# Patient Record
Sex: FEMALE | Race: WHITE | NOT HISPANIC OR LATINO | Employment: OTHER | URBAN - METROPOLITAN AREA
[De-identification: names, ages, dates, MRNs, and addresses within clinical notes are randomized per-mention and may not be internally consistent; named-entity substitution may affect disease eponyms.]

---

## 2017-01-04 ENCOUNTER — LAB (OUTPATIENT)
Dept: INTERNAL MEDICINE | Facility: CLINIC | Age: 62
End: 2017-01-04

## 2017-01-04 DIAGNOSIS — R31.9 HEMATURIA: Primary | ICD-10-CM

## 2017-01-04 LAB
BACTERIA UR QL AUTO: ABNORMAL /HPF
BILIRUB UR QL CFM: NEGATIVE
BILIRUB UR QL STRIP: ABNORMAL
CLARITY UR: CLEAR
COLOR UR: YELLOW
GLUCOSE UR STRIP-MCNC: NEGATIVE MG/DL
HGB UR QL STRIP.AUTO: ABNORMAL
HYALINE CASTS UR QL AUTO: ABNORMAL /LPF
KETONES UR QL STRIP: ABNORMAL
LEUKOCYTE ESTERASE UR QL STRIP.AUTO: ABNORMAL
NITRITE UR QL STRIP: NEGATIVE
PH UR STRIP.AUTO: <=5 [PH] (ref 5–8)
PROT UR QL STRIP: NEGATIVE
RBC # UR: ABNORMAL /HPF
REF LAB TEST METHOD: ABNORMAL
SP GR UR STRIP: 1.02 (ref 1–1.03)
SQUAMOUS #/AREA URNS HPF: ABNORMAL /HPF
UROBILINOGEN UR QL STRIP: ABNORMAL
WBC UR QL AUTO: ABNORMAL /HPF

## 2017-01-04 PROCEDURE — 81001 URINALYSIS AUTO W/SCOPE: CPT | Performed by: INTERNAL MEDICINE

## 2017-01-09 ENCOUNTER — TELEPHONE (OUTPATIENT)
Dept: INTERNAL MEDICINE | Facility: CLINIC | Age: 62
End: 2017-01-09

## 2017-01-09 NOTE — TELEPHONE ENCOUNTER
----- Message from Daisha Day MA sent at 1/9/2017 10:55 AM EST -----  pls review UA recheck and advise per pt request    Pt#[phone]

## 2017-02-23 DIAGNOSIS — E03.9 PRIMARY HYPOTHYROIDISM: Primary | ICD-10-CM

## 2017-02-23 RX ORDER — MULTIVIT-MIN/IRON FUM/FOLIC AC 7.5 MG-4
1 TABLET ORAL DAILY
Qty: 90 TABLET | Refills: 0 | Status: SHIPPED | OUTPATIENT
Start: 2017-02-23 | End: 2018-02-23

## 2017-02-23 RX ORDER — CHLORAL HYDRATE 500 MG
1000 CAPSULE ORAL 2 TIMES DAILY WITH MEALS
Qty: 90 CAPSULE | Refills: 0 | Status: SHIPPED | OUTPATIENT
Start: 2017-02-23 | End: 2021-02-04

## 2017-02-23 RX ORDER — LEVOTHYROXINE SODIUM 125 MCG
125 TABLET ORAL DAILY
Qty: 90 TABLET | Refills: 0 | Status: SHIPPED | OUTPATIENT
Start: 2017-02-23 | End: 2017-12-27 | Stop reason: SDUPTHER

## 2017-02-23 RX ORDER — B-COMPLEX WITH VITAMIN C
1 TABLET ORAL DAILY
Qty: 90 EACH | Refills: 0 | Status: SHIPPED | OUTPATIENT
Start: 2017-02-23 | End: 2021-02-04

## 2017-02-23 NOTE — TELEPHONE ENCOUNTER
Patient states unsure of mg of meds but they are requesting for them to be wrote out as prescriptions for her flex spending to cover

## 2017-02-23 NOTE — TELEPHONE ENCOUNTER
----- Message from Rozina Roberts sent at 2/23/2017  9:39 AM EST -----  Contact: pt  Pt calling, she is retiring and needs to use her flex spending card. Pt would like refills on prescriptions sent into the pharmacy and new prescriptions.     SYNTHROID 125 MCG tablet, 90 day supply    Vitamin D prescription, 90 days     Vitamin B prescription, 90 days  Multiviatmin, 90 days     She says even though she can get it over the counter she will need a prescription to the pharmacy in order to use flex spending.     To [facility]id on [facility]     #[phone]  Ok to matt

## 2017-02-24 ENCOUNTER — TELEPHONE (OUTPATIENT)
Dept: INTERNAL MEDICINE | Facility: CLINIC | Age: 62
End: 2017-02-24

## 2017-02-24 DIAGNOSIS — E55.9 AVITAMINOSIS D: Primary | ICD-10-CM

## 2017-02-24 NOTE — TELEPHONE ENCOUNTER
----- Message from Deepali Alarcon sent at 2/23/2017  3:57 PM EST -----  Contact: pt - Dr Martínez's pt - RE: Rx  Pt calling and would like a Rx for Vit D called to pt's pharmacy. Pt informs that if Rx called to pharmacy pt can get on Flex spending. Pt informs that pt is retiring and would like to use money on card. Could you please call Rx to pt's pharmacy? Please advise. Thanks      Vit D 5000 units      Pt # [phone]

## 2017-03-09 ENCOUNTER — TELEPHONE (OUTPATIENT)
Dept: INTERNAL MEDICINE | Facility: CLINIC | Age: 62
End: 2017-03-09

## 2017-03-09 NOTE — TELEPHONE ENCOUNTER
----- Message from Flaquita Sevilla sent at 3/9/2017 10:50 AM EST -----  Contact: pt  Pt would like a referral to Dr. Haider for endocrinology.  Pt stated she has been on Synthroid for a long time and would like to see endocrine.    Pt's # [phone]

## 2017-04-07 ENCOUNTER — TELEPHONE (OUTPATIENT)
Dept: INTERNAL MEDICINE | Facility: CLINIC | Age: 62
End: 2017-04-07

## 2017-04-07 DIAGNOSIS — E03.9 ADULT HYPOTHYROIDISM: Primary | ICD-10-CM

## 2017-04-07 NOTE — TELEPHONE ENCOUNTER
----- Message from Flaquita Sevilla sent at 4/7/2017  4:22 PM EDT -----  Contact: pt     ----- Message -----     From: Flaquita Sevilla     Sent: 3/9/2017  10:50 AM       To: Rebecca Pugh    Pt would like a referral to Dr. Haider for endocrinology.  Pt stated she has been on Synthroid for a long time and would like to see endocrine.    Pt's # [phone]

## 2017-05-25 ENCOUNTER — TELEPHONE (OUTPATIENT)
Dept: INTERNAL MEDICINE | Facility: CLINIC | Age: 62
End: 2017-05-25

## 2017-06-07 ENCOUNTER — OFFICE VISIT (OUTPATIENT)
Dept: INTERNAL MEDICINE | Facility: CLINIC | Age: 62
End: 2017-06-07

## 2017-06-07 VITALS
WEIGHT: 191 LBS | BODY MASS INDEX: 30.7 KG/M2 | HEIGHT: 66 IN | SYSTOLIC BLOOD PRESSURE: 108 MMHG | TEMPERATURE: 97.7 F | DIASTOLIC BLOOD PRESSURE: 64 MMHG

## 2017-06-07 DIAGNOSIS — E03.9 ADULT HYPOTHYROIDISM: ICD-10-CM

## 2017-06-07 DIAGNOSIS — K21.9 GASTROESOPHAGEAL REFLUX DISEASE WITHOUT ESOPHAGITIS: ICD-10-CM

## 2017-06-07 DIAGNOSIS — R48.1 LOSS OF PERCEPTION FOR TASTE: ICD-10-CM

## 2017-06-07 DIAGNOSIS — R31.9 BLOOD IN URINE: Primary | ICD-10-CM

## 2017-06-07 PROBLEM — K63.5 COLON POLYP: Status: ACTIVE | Noted: 2017-06-07

## 2017-06-07 LAB
BACTERIA UR QL AUTO: ABNORMAL /HPF
BILIRUB UR QL STRIP: NEGATIVE
CLARITY UR: CLEAR
COLOR UR: YELLOW
GLUCOSE UR STRIP-MCNC: NEGATIVE MG/DL
HGB UR QL STRIP.AUTO: ABNORMAL
HYALINE CASTS UR QL AUTO: ABNORMAL /LPF
KETONES UR QL STRIP: NEGATIVE
LEUKOCYTE ESTERASE UR QL STRIP.AUTO: NEGATIVE
MUCOUS THREADS URNS QL MICRO: ABNORMAL /HPF
NITRITE UR QL STRIP: NEGATIVE
PH UR STRIP.AUTO: <=5 [PH] (ref 5–8)
PROT UR QL STRIP: NEGATIVE
RBC # UR: ABNORMAL /HPF
REF LAB TEST METHOD: ABNORMAL
SP GR UR STRIP: 1.02 (ref 1–1.03)
SQUAMOUS #/AREA URNS HPF: ABNORMAL /HPF
UROBILINOGEN UR QL STRIP: ABNORMAL
WBC UR QL AUTO: ABNORMAL /HPF

## 2017-06-07 PROCEDURE — 99214 OFFICE O/P EST MOD 30 MIN: CPT | Performed by: INTERNAL MEDICINE

## 2017-06-07 PROCEDURE — 81001 URINALYSIS AUTO W/SCOPE: CPT | Performed by: INTERNAL MEDICINE

## 2017-06-07 RX ORDER — OMEPRAZOLE 40 MG/1
40 CAPSULE, DELAYED RELEASE ORAL DAILY
Qty: 30 CAPSULE | Refills: 0 | Status: SHIPPED | OUTPATIENT
Start: 2017-06-07 | End: 2017-09-15

## 2017-06-07 NOTE — PROGRESS NOTES
"Subjective   Oneida See is a 62 y.o. female. Here c/o acid reflux and sensation of something in the back of the throat. She requests referral to endocrinologist.    History of Present Illness     /64  Temp 97.7 °F (36.5 °C)  Ht 65.5\" (166.4 cm)  Wt 191 lb (86.6 kg)  BMI 31.3 kg/m2    Current Outpatient Prescriptions:   •  albuterol (PROVENTIL HFA;VENTOLIN HFA) 108 (90 BASE) MCG/ACT inhaler, Inhale 2 puffs every 4 (four) hours as needed for wheezing., Disp: 1 inhaler, Rfl: 11  •  B Complex Vitamins (VITAMIN B COMPLEX) tablet, Take 1 tablet by mouth Daily., Disp: 90 each, Rfl: 0  •  Cholecalciferol (VITAMIN D3) 5000 UNITS capsule capsule, Take 1 capsule by mouth Daily., Disp: 90 capsule, Rfl: 3  •  Multiple Vitamins-Minerals (MULTIVITAMIN WITH MINERALS) tablet, Take 1 tablet by mouth Daily., Disp: 90 tablet, Rfl: 0  •  Omega-3 Fatty Acids (FISH OIL) 1000 MG capsule capsule, Take 1 capsule by mouth 2 (Two) Times a Day With Meals., Disp: 90 capsule, Rfl: 0  •  omeprazole (PriLOSEC) 40 MG capsule, Take 1 capsule by mouth daily., Disp: 30 capsule, Rfl: 0  •  SYNTHROID 125 MCG tablet, Take 1 tablet by mouth Daily., Disp: 90 tablet, Rfl: 0    Patient also is here for chronic hypothyroidism f/u. Takes Levothyroxine daily. Patient is compliant with treatment. Denies cold/heat intolerance. Weight is increasing. Patient denies constipation. No changes in the skin, hair or nails. Last time her TSH/Free T4 were tested was in 12/2016 and those were normal. Patient is upset and concerned that I didn't check entire \"panel\", ans also is concerned that she might be having \"adrenal fatigue\" and needs cortisol checked, so she insists on referral to endocrinologist. Gained 13 lbs in the last 6 months.  In a past had been noticed to have microscopic hematuria. No h/o kidney stones, no h/o tobacco smoking.  Patient c/o burning in the throat, some sensation of the \"something hanging on the back of her throat\". Per last brain " "CT - clear sinuses. Denies abdominal pain. Patient had presented to me once in a past with picture consistent with GERD and was prescribed Omeprazole, that she had never started on. States that unable to eat spicy food, that the taste is \"wrong\". Denies metallic taste. Some discomfort on swallowing liquids and solids. No odynophagia. Drinks Coke once a day, that causes some burning .    The following portions of the patient's history were reviewed and updated as appropriate: allergies, current medications, past family history, past medical history, past social history, past surgical history and problem list.    Review of Systems   Constitutional: Negative for chills and fever.   HENT: Positive for trouble swallowing. Sore throat: buring in throat like acid reflux.    Eyes: Negative for pain and redness.   Respiratory: Negative for cough and shortness of breath.    Cardiovascular: Negative for chest pain and leg swelling.   Neurological: Negative for dizziness and headaches.       Objective   Physical Exam   Constitutional: She is oriented to person, place, and time. She appears well-developed and well-nourished.   HENT:   Head: Normocephalic and atraumatic.   Right Ear: Tympanic membrane, external ear and ear canal normal.   Left Ear: Tympanic membrane, external ear and ear canal normal.   Nose: Nose normal. Right sinus exhibits no maxillary sinus tenderness and no frontal sinus tenderness. Left sinus exhibits no maxillary sinus tenderness and no frontal sinus tenderness.   Mouth/Throat: Uvula is midline, oropharynx is clear and moist and mucous membranes are normal.   Eyes: Conjunctivae and EOM are normal. Pupils are equal, round, and reactive to light. Right eye exhibits no discharge. Left eye exhibits no discharge. No scleral icterus.   Neck: Neck supple. No JVD present.   Cardiovascular: Normal rate, regular rhythm and normal heart sounds.  Exam reveals no gallop and no friction rub.    No murmur " heard.  Pulmonary/Chest: Effort normal and breath sounds normal. She has no wheezes. She has no rales.   Musculoskeletal: She exhibits no edema.   Lymphadenopathy:     She has no cervical adenopathy.   Neurological: She is alert and oriented to person, place, and time. No cranial nerve deficit.   Skin: Skin is warm and dry. No rash noted.   Psychiatric: She has a normal mood and affect. Her behavior is normal.   Vitals reviewed.      Assessment/Plan   Diagnoses and all orders for this visit:    Blood in urine  -     Urinalysis w/ Microscopic if Indicated; Future  -     Urinalysis w/ Microscopic if Indicated  -     Urinalysis, Microscopic Only; Future  -     Urinalysis, Microscopic Only    Adult hypothyroidism  -     Ambulatory Referral to Endocrinology    Gastroesophageal reflux disease without esophagitis      GERD - lifestyle changes discussed with patient. Recommended NPO after supper, dietary restrictions: avoid spicy foods, tomato based sauces and mint and caffeine reduction. Will start medical treatment as per orders and reevaluate in 6 weeks. Start on PPI, even patient prefers holistic treatments.  Hypothyroidism - on patient request will refer to endocrinologist. Might be on a low side now as had gained weight.  Microscopic hematuria - will repeat UA.

## 2017-07-28 RX ORDER — LEVOTHYROXINE SODIUM 125 MCG
TABLET ORAL
Qty: 30 TABLET | Refills: 4 | Status: SHIPPED | OUTPATIENT
Start: 2017-07-28 | End: 2017-09-15

## 2017-09-15 ENCOUNTER — OFFICE VISIT (OUTPATIENT)
Dept: INTERNAL MEDICINE | Facility: CLINIC | Age: 62
End: 2017-09-15

## 2017-09-15 VITALS
HEART RATE: 94 BPM | TEMPERATURE: 98.1 F | BODY MASS INDEX: 30.37 KG/M2 | DIASTOLIC BLOOD PRESSURE: 68 MMHG | HEIGHT: 66 IN | OXYGEN SATURATION: 99 % | SYSTOLIC BLOOD PRESSURE: 100 MMHG | WEIGHT: 189 LBS

## 2017-09-15 DIAGNOSIS — J02.9 ACUTE PHARYNGITIS, UNSPECIFIED ETIOLOGY: Primary | ICD-10-CM

## 2017-09-15 LAB
EXPIRATION DATE: NORMAL
INTERNAL CONTROL: NORMAL
Lab: NORMAL
S PYO AG THROAT QL: NEGATIVE

## 2017-09-15 PROCEDURE — 87880 STREP A ASSAY W/OPTIC: CPT | Performed by: NURSE PRACTITIONER

## 2017-09-15 PROCEDURE — 99212 OFFICE O/P EST SF 10 MIN: CPT | Performed by: NURSE PRACTITIONER

## 2017-09-15 NOTE — PROGRESS NOTES
Subjective   Oneida See is a 62 y.o. female.     HPI Comments: No recent travel. She has been going to PT therapy and unsure if she may picked up something for them .     Sore Throat    This is a new problem. There has been no fever. The pain is at a severity of 7/10. The pain is moderate. Associated symptoms include congestion (head, nasal ), a hoarse voice and swollen glands. Pertinent negatives include no abdominal pain, coughing, diarrhea, ear discharge, ear pain, headaches, plugged ear sensation, shortness of breath or vomiting. She has had no exposure to strep or mono. Treatments tried: hot tea  The treatment provided mild relief.        The following portions of the patient's history were reviewed and updated as appropriate: allergies, current medications and problem list.    Review of Systems   Constitutional: Negative for appetite change, chills, fatigue and fever.   HENT: Positive for congestion (head, nasal ), hoarse voice and sore throat. Negative for ear discharge, ear pain, facial swelling, hearing loss, postnasal drip, rhinorrhea, sinus pressure, sneezing and tinnitus.    Respiratory: Negative for cough, chest tightness, shortness of breath and wheezing.    Cardiovascular: Negative for chest pain.   Gastrointestinal: Negative for abdominal pain, diarrhea, nausea and vomiting.   Allergic/Immunologic: Negative for environmental allergies.   Neurological: Negative for headaches.   Hematological: Negative for adenopathy.       Objective   Physical Exam   Constitutional: She appears well-developed and well-nourished. She is cooperative. She does not have a sickly appearance. She does not appear ill.   HENT:   Head: Normocephalic.   Right Ear: Hearing, tympanic membrane and external ear normal. No drainage, swelling or tenderness. No mastoid tenderness. Tympanic membrane is not injected, not scarred, not erythematous and not bulging. Tympanic membrane mobility is normal. No middle ear effusion. No  decreased hearing is noted.   Left Ear: Hearing, tympanic membrane and external ear normal. No drainage, swelling or tenderness. No mastoid tenderness. Tympanic membrane is not injected, not scarred, not erythematous and not bulging. Tympanic membrane mobility is normal.  No middle ear effusion. No decreased hearing is noted.   Nose: Nose normal. No mucosal edema, rhinorrhea, sinus tenderness or nasal deformity. Right sinus exhibits no maxillary sinus tenderness and no frontal sinus tenderness. Left sinus exhibits no maxillary sinus tenderness and no frontal sinus tenderness.   Mouth/Throat: Mucous membranes are normal. Normal dentition. Posterior oropharyngeal erythema present. No tonsillar exudate.   Eyes: Conjunctivae and lids are normal. Pupils are equal, round, and reactive to light. Right eye exhibits no discharge and no exudate. Left eye exhibits no discharge and no exudate.   Neck: Trachea normal and normal range of motion. No edema present. No thyroid mass and no thyromegaly present.   Cardiovascular: Regular rhythm, normal heart sounds and normal pulses.    No murmur heard.  Pulmonary/Chest: Breath sounds normal. No respiratory distress. She has no decreased breath sounds. She has no wheezes. She has no rhonchi. She has no rales.   Lymphadenopathy:        Head (right side): No submental, no submandibular, no tonsillar, no preauricular, no posterior auricular and no occipital adenopathy present.        Head (left side): No submental, no submandibular, no tonsillar, no preauricular, no posterior auricular and no occipital adenopathy present.   Neurological: She is alert.   Skin: Skin is warm, dry and intact. No cyanosis. Nails show no clubbing.       Assessment/Plan   Oneida was seen today for sore throat.    Diagnoses and all orders for this visit:    Acute pharyngitis, unspecified etiology  Comments:  warm salt gargles, ibuprofen prn   Orders:  -     POC Rapid Strep A    Rapid strep negative. I suspect  symptoms viral/allergic in nature. She will return if worsening of symptoms.

## 2017-09-15 NOTE — PATIENT INSTRUCTIONS

## 2017-12-27 ENCOUNTER — TELEPHONE (OUTPATIENT)
Dept: INTERNAL MEDICINE | Facility: CLINIC | Age: 62
End: 2017-12-27

## 2017-12-27 DIAGNOSIS — E03.9 PRIMARY HYPOTHYROIDISM: ICD-10-CM

## 2017-12-27 RX ORDER — LEVOTHYROXINE SODIUM 125 MCG
125 TABLET ORAL DAILY
Qty: 90 TABLET | Refills: 3 | Status: SHIPPED | OUTPATIENT
Start: 2017-12-27 | End: 2018-05-03

## 2017-12-27 RX ORDER — LEVOTHYROXINE SODIUM 125 MCG
TABLET ORAL
Qty: 30 TABLET | Refills: 3 | Status: SHIPPED | OUTPATIENT
Start: 2017-12-27 | End: 2017-12-27 | Stop reason: SDUPTHER

## 2017-12-27 RX ORDER — LEVOTHYROXINE SODIUM 125 MCG
125 TABLET ORAL DAILY
Qty: 30 TABLET | Refills: 3 | Status: SHIPPED | OUTPATIENT
Start: 2017-12-27 | End: 2018-10-02 | Stop reason: SDUPTHER

## 2017-12-27 NOTE — TELEPHONE ENCOUNTER
----- Message from Lilli Bryson sent at 12/27/2017 11:40 AM EST -----  Contact: pt  Pt would like a refill for   SYNTHROID 125 MCG tablet    [facility] [place] [address] [address] AT [facility] [address] - [phone]  - [phone] FX        Pt needs this with the prescription and faxed to  [phone] [facility]  Dispense as written only brand name can be what's ordered     Pt # [phone]

## 2018-02-14 ENCOUNTER — TELEPHONE (OUTPATIENT)
Dept: INTERNAL MEDICINE | Facility: CLINIC | Age: 63
End: 2018-02-14

## 2018-02-14 NOTE — TELEPHONE ENCOUNTER
----- Message from Deepali Alarcon sent at 2/14/2018  2:22 PM EST -----  Contact: [facility] - Dr Martínez's pt - RE: order for new C-pap  ana lilia Garcia calling and would like an order for pt's C-pap machine. Radha informs that the pt's C-pap is out dated & barely working. Could you please send an order to [facility]? Please advise. Thanks      ana l[facility] Garcia[facility]  # [phone]  Fax # [phone]

## 2018-02-15 DIAGNOSIS — G47.33 OBSTRUCTIVE APNEA: Primary | ICD-10-CM

## 2018-02-15 NOTE — TELEPHONE ENCOUNTER
Spoke with Radha at [facility], she states the message is wrong, patients machine is working fine, she is in need of a new face mask and tubing. Please advise

## 2018-02-15 NOTE — TELEPHONE ENCOUNTER
She had lost a lot of weight since started on CX-PAP. I will suggest to get another sleep study and placed referral to Dr.Lebnan Austin

## 2018-02-20 ENCOUNTER — TELEPHONE (OUTPATIENT)
Dept: INTERNAL MEDICINE | Facility: CLINIC | Age: 63
End: 2018-02-20

## 2018-02-20 NOTE — TELEPHONE ENCOUNTER
----- Message from Lilli Bryson sent at 2/20/2018  1:21 PM EST -----  Contact: [facility]  Pt needs an order to get her c-pap supplies.  Mask.      Caller# [phone]    Fax

## 2018-02-20 NOTE — TELEPHONE ENCOUNTER
----- Message from Tami Moreland sent at 2/20/2018  1:34 PM EST -----  Contact: Patient   Patient called requesting order for cushion for her CPAP mask.  I made her aware of schedule for today, and told her we would call when order faxed, and she was fine with that.  Please advise.      Patient:  [phone]    Mariam's:  [phone]         FAX:  [phone]

## 2018-02-28 ENCOUNTER — TELEPHONE (OUTPATIENT)
Dept: INTERNAL MEDICINE | Facility: CLINIC | Age: 63
End: 2018-02-28

## 2018-02-28 NOTE — TELEPHONE ENCOUNTER
----- Message from Lilli Bryson sent at 2/28/2018 12:40 PM EST -----  Contact: pt  at [facility] now  Sorry I copied info from previous messages, it needs to go to [facility], patient was there stating pharmacy has not received it, she put them on the phone to speak with me and asked for it to be sent to the fax number below again. I clarified the number.      ----- Message -----     From: Aimee Cabrera MA     Sent: 2/28/2018  10:51 AM       To: Lilli Bryson    This order was faxed to [facility] attention Radha. Does the patient need this sent to [facility] or [facility]an please advise message      ----- Message -----     From: Lilli Bryson     Sent: 2/28/2018  10:43 AM       To: Aimee Cabrera MA      Pt calling asking for cushion for cpap mask, faxed to [facility].    Patient:  [phone]     Betos:  [phone]         FAX:  [phone]

## 2018-04-09 ENCOUNTER — TELEPHONE (OUTPATIENT)
Dept: ENDOCRINOLOGY | Age: 63
End: 2018-04-09

## 2018-04-09 NOTE — TELEPHONE ENCOUNTER
THIS PATIENT HAS NOT SEEN DR ARANDA IN THIS OFFICE HER PCP IS THE ONE WHO ORDER THE SLEEP APENA  PHONE NUMBER WAS GIVEN TO TO [facility]      ----- Message from Yolanda Solares sent at 4/9/2018  9:49 AM EDT -----  Contact: [facility]   MRS DIALLO CALLED IN REGARDS TO GETTING A PRESCRIPTION FOR SLEEP APENA .  THEY ARE ASKING FOR IT TO RE FAX OVER TO THEM   FAX#: [phone]  PHONE#: [phone]

## 2018-04-11 ENCOUNTER — TELEPHONE (OUTPATIENT)
Dept: INTERNAL MEDICINE | Facility: CLINIC | Age: 63
End: 2018-04-11

## 2018-04-11 NOTE — TELEPHONE ENCOUNTER
----- Message from Lilli Bryson sent at 4/9/2018 10:41 AM EDT -----  Contact: [facility].  MRS DIALLO CALLED IN REGARDS TO GETTING A PRESCRIPTION FOR SLEEP APENA .  THEY ARE ASKING FOR IT TO RE FAX OVER TO THEM   FAX#: [phone]  PHONE#: [phone]         [facility] would also like the order from 2/28 to be refaxed.    Pt has not been seen since sept 2017 - sore throat.

## 2018-05-03 ENCOUNTER — OFFICE VISIT (OUTPATIENT)
Dept: INTERNAL MEDICINE | Facility: CLINIC | Age: 63
End: 2018-05-03

## 2018-05-03 VITALS
BODY MASS INDEX: 31.98 KG/M2 | HEIGHT: 66 IN | DIASTOLIC BLOOD PRESSURE: 76 MMHG | WEIGHT: 199 LBS | HEART RATE: 72 BPM | OXYGEN SATURATION: 98 % | SYSTOLIC BLOOD PRESSURE: 112 MMHG

## 2018-05-03 DIAGNOSIS — R20.2 NUMBNESS AND TINGLING IN BOTH HANDS: ICD-10-CM

## 2018-05-03 DIAGNOSIS — M54.2 NECK PAIN, ACUTE: Primary | ICD-10-CM

## 2018-05-03 DIAGNOSIS — R20.0 NUMBNESS AND TINGLING IN BOTH HANDS: ICD-10-CM

## 2018-05-03 PROCEDURE — 99213 OFFICE O/P EST LOW 20 MIN: CPT | Performed by: INTERNAL MEDICINE

## 2018-05-03 NOTE — PROGRESS NOTES
"Subjective   Oneida See is a 63 y.o. female.     History of Present Illness   /76 (BP Location: Left arm, Patient Position: Sitting, Cuff Size: Adult)   Pulse 72   Ht 166.4 cm (65.5\")   Wt 90.3 kg (199 lb)   SpO2 98%   BMI 32.61 kg/m²   Oneida See63 y.o.female presents today c/o acute onset of the pain in the bilateral neck.  Pain started last week. Precipitating event: none. Patient describes pain as dull ache. Intensity of the pain: moderate. Patient reports numbness/tingling in both hands at night, that at times wakes her up, and gets better with change in the position.The numbness is on the ulnar part pf her hands. Numbness started last year. States that the pain is episodic.   Patient  has had similar episode in a past.   Treatments tried: none.  PMH is significant for chronioc neck apain that had improved after the PT a year ago..      Current Outpatient Prescriptions:   •  B Complex Vitamins (VITAMIN B COMPLEX) tablet, Take 1 tablet by mouth Daily., Disp: 90 each, Rfl: 0  •  Cholecalciferol (VITAMIN D3) 5000 UNITS capsule capsule, Take 1 capsule by mouth Daily., Disp: 90 capsule, Rfl: 3  •  Omega-3 Fatty Acids (FISH OIL) 1000 MG capsule capsule, Take 1 capsule by mouth 2 (Two) Times a Day With Meals., Disp: 90 capsule, Rfl: 0  •  SYNTHROID 125 MCG tablet, Take 1 tablet by mouth Daily., Disp: 30 tablet, Rfl: 3  The following portions of the patient's history were reviewed and updated as appropriate: allergies, current medications, past family history, past medical history, past social history, past surgical history and problem list.    Review of Systems   Constitutional: Negative for chills and fever.   Eyes: Negative for pain and redness.   Respiratory: Negative for cough and shortness of breath.    Cardiovascular: Negative for chest pain and leg swelling.   Neurological: Negative for dizziness and headaches.       Objective   Physical Exam   Constitutional: She is oriented to person, " place, and time. She appears well-developed and well-nourished.   HENT:   Head: Normocephalic and atraumatic.   Right Ear: Tympanic membrane, external ear and ear canal normal.   Left Ear: Tympanic membrane, external ear and ear canal normal.   Nose: Nose normal. Right sinus exhibits no maxillary sinus tenderness and no frontal sinus tenderness. Left sinus exhibits no maxillary sinus tenderness and no frontal sinus tenderness.   Mouth/Throat: Uvula is midline, oropharynx is clear and moist and mucous membranes are normal.   Eyes: Conjunctivae and EOM are normal. Pupils are equal, round, and reactive to light. Right eye exhibits no discharge. Left eye exhibits no discharge. No scleral icterus.   Neck: Neck supple. No JVD present.   Cardiovascular: Normal rate, regular rhythm and normal heart sounds.  Exam reveals no gallop and no friction rub.    No murmur heard.  Pulmonary/Chest: Effort normal and breath sounds normal. She has no wheezes. She has no rales.   Musculoskeletal: She exhibits tenderness (minimal tenedernbess on p(alpation over the posterior neck). She exhibits no edema.   Lymphadenopathy:     She has no cervical adenopathy.   Neurological: She is alert and oriented to person, place, and time. No cranial nerve deficit.   Skin: Skin is warm and dry. No rash noted.   Psychiatric: She has a normal mood and affect. Her behavior is normal.   Vitals reviewed.      Assessment/Plan   Oneida was seen today for neck pain and numbness.    Diagnoses and all orders for this visit:    Neck pain, acute  -     Ambulatory Referral to Physical Therapy Evaluate and treat    Numbness and tingling in both hands    1. Neck pain - try PT and use heat on the back of her neck.  2. Numbness in her hands - most likely carpal tunnel s-m. Offered EMG and NCS - patient declines.

## 2018-06-08 ENCOUNTER — OFFICE VISIT (OUTPATIENT)
Dept: INTERNAL MEDICINE | Facility: CLINIC | Age: 63
End: 2018-06-08

## 2018-06-08 VITALS
BODY MASS INDEX: 32.14 KG/M2 | DIASTOLIC BLOOD PRESSURE: 78 MMHG | WEIGHT: 200 LBS | SYSTOLIC BLOOD PRESSURE: 112 MMHG | HEIGHT: 66 IN

## 2018-06-08 DIAGNOSIS — M54.14 THORACIC RADICULOPATHY: Primary | ICD-10-CM

## 2018-06-08 PROCEDURE — 99213 OFFICE O/P EST LOW 20 MIN: CPT | Performed by: INTERNAL MEDICINE

## 2018-06-08 RX ORDER — IBUPROFEN 200 MG
200 TABLET ORAL AS NEEDED
COMMUNITY
End: 2021-02-04

## 2018-06-08 NOTE — PROGRESS NOTES
"Subjective   Oneida See is a 63 y.o. female.     History of Present Illness   /78 (BP Location: Right arm, Patient Position: Sitting, Cuff Size: Adult)   Ht 166.4 cm (65.5\")   Wt 90.7 kg (200 lb)   BMI 32.78 kg/m²   Oneida See63 y.o.female presents to the office c/o pain under the left shoulder blades.  S-ms started last night. Precipitating event: heavy lifting. Patient describes sharp pain radiating around the chest wall under her left breast. Intensity of the s-ms: severe. Patient denies  cough. States that the pain had been episodic, worse with turning in a bed and with seep breathing.   Patient  has had no similar episode in a past.   Treatments tried: OTC Ibuprofen and Aleve withrelief.  PMH is significant for Vit D deficiency.      Current Outpatient Prescriptions:   •  B Complex Vitamins (VITAMIN B COMPLEX) tablet, Take 1 tablet by mouth Daily., Disp: 90 each, Rfl: 0  •  Cholecalciferol (VITAMIN D3) 5000 UNITS capsule capsule, Take 1 capsule by mouth Daily., Disp: 90 capsule, Rfl: 3  •  ibuprofen (ADVIL) 200 MG tablet, Take 200 mg by mouth As Needed for Mild Pain ., Disp: , Rfl:   •  Omega-3 Fatty Acids (FISH OIL) 1000 MG capsule capsule, Take 1 capsule by mouth 2 (Two) Times a Day With Meals., Disp: 90 capsule, Rfl: 0  •  SYNTHROID 125 MCG tablet, Take 1 tablet by mouth Daily., Disp: 30 tablet, Rfl: 3    The following portions of the patient's history were reviewed and updated as appropriate: allergies, current medications, past family history, past medical history, past social history, past surgical history and problem list.    Review of Systems   Constitutional: Negative for chills and fever.   Eyes: Negative for pain and redness.   Respiratory: Negative for cough and shortness of breath.    Cardiovascular: Positive for chest pain. Negative for leg swelling.   Neurological: Negative for dizziness and headaches.       Objective   Physical Exam   Constitutional: She is oriented to person, " place, and time. She appears well-developed and well-nourished.   HENT:   Head: Normocephalic and atraumatic.   Right Ear: Tympanic membrane, external ear and ear canal normal.   Left Ear: Tympanic membrane, external ear and ear canal normal.   Nose: Nose normal. Right sinus exhibits no maxillary sinus tenderness and no frontal sinus tenderness. Left sinus exhibits no maxillary sinus tenderness and no frontal sinus tenderness.   Mouth/Throat: Uvula is midline, oropharynx is clear and moist and mucous membranes are normal.   Eyes: Conjunctivae and EOM are normal. Pupils are equal, round, and reactive to light. Right eye exhibits no discharge. Left eye exhibits no discharge. No scleral icterus.   Neck: Neck supple. No JVD present.   Cardiovascular: Normal rate, regular rhythm and normal heart sounds.  Exam reveals no gallop and no friction rub.    No murmur heard.  Pulmonary/Chest: Effort normal and breath sounds normal. She has no wheezes. She has no rales.   Musculoskeletal: She exhibits tenderness (paravertebrally along the T4-5 on a left side). She exhibits no edema.   Lymphadenopathy:     She has no cervical adenopathy.   Neurological: She is alert and oriented to person, place, and time. No cranial nerve deficit.   Skin: Skin is warm and dry. No rash noted.   Psychiatric: She has a normal mood and affect. Her behavior is normal.   Vitals reviewed.      Assessment/Plan   Oneida was seen today for shoulder pain and abdominal pain.    Diagnoses and all orders for this visit:    Thoracic radiculopathy    Thoracic radicular pain - will try heat and over the counter Salonpas patches, also will refer to chiropractor.

## 2018-06-08 NOTE — PATIENT INSTRUCTIONS
Thoracic radicular pain - will try heat and over the counter Salonpas patches, also will refer to chiropractor.

## 2018-08-14 ENCOUNTER — OFFICE VISIT (OUTPATIENT)
Dept: INTERNAL MEDICINE | Facility: CLINIC | Age: 63
End: 2018-08-14

## 2018-08-14 VITALS
BODY MASS INDEX: 32.95 KG/M2 | RESPIRATION RATE: 14 BRPM | HEIGHT: 66 IN | WEIGHT: 205 LBS | DIASTOLIC BLOOD PRESSURE: 80 MMHG | SYSTOLIC BLOOD PRESSURE: 120 MMHG

## 2018-08-14 DIAGNOSIS — G89.29 CHRONIC NECK PAIN: Primary | ICD-10-CM

## 2018-08-14 DIAGNOSIS — M54.2 CHRONIC NECK PAIN: Primary | ICD-10-CM

## 2018-08-14 DIAGNOSIS — R19.5 LARGE STOOL: ICD-10-CM

## 2018-08-14 PROCEDURE — 99213 OFFICE O/P EST LOW 20 MIN: CPT | Performed by: INTERNAL MEDICINE
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
## 2018-08-14 NOTE — PATIENT INSTRUCTIONS
1. Neck pain - musculoskeletal, will refer to PT.  2. Large caliber of stools - add some more fruits, prunes, plums, apricots or peaches.

## 2018-09-11 ENCOUNTER — TELEPHONE (OUTPATIENT)
Dept: INTERNAL MEDICINE | Facility: CLINIC | Age: 63
End: 2018-09-11

## 2018-09-11 NOTE — TELEPHONE ENCOUNTER
----- Message from Archana Rodriguez sent at 9/11/2018 10:15 AM EDT -----  Contact: Patient  Patient calling is not real happy with [facility] and would like to know if you can recommend another medical supply place for her CPAP machine. Please advise    Patient:[phone]

## 2018-09-11 NOTE — TELEPHONE ENCOUNTER
Patient states she would like to try [facility], she was mistaken and has previously used [facility]. States she is having an issues with mask staying connected to tubing, patient is going to go to [facility] to talk with them if this would be a simple fix or if a script is needed. If so she is to contact the office and we will take care of right away

## 2018-10-02 ENCOUNTER — TELEPHONE (OUTPATIENT)
Dept: INTERNAL MEDICINE | Facility: CLINIC | Age: 63
End: 2018-10-02

## 2018-10-02 DIAGNOSIS — E03.9 PRIMARY HYPOTHYROIDISM: ICD-10-CM

## 2018-10-02 RX ORDER — LEVOTHYROXINE SODIUM 125 MCG
TABLET ORAL
Qty: 30 TABLET | Refills: 2 | Status: SHIPPED | OUTPATIENT
Start: 2018-10-02 | End: 2018-10-02 | Stop reason: SDUPTHER

## 2018-10-02 RX ORDER — LEVOTHYROXINE SODIUM 125 MCG
125 TABLET ORAL DAILY
Qty: 30 TABLET | Refills: 2 | Status: SHIPPED | OUTPATIENT
Start: 2018-10-02 | End: 2019-01-02 | Stop reason: SDUPTHER

## 2018-10-02 NOTE — TELEPHONE ENCOUNTER
----- Message from Lilli Bryson sent at 10/2/2018 10:43 AM EDT -----  Contact: Pt  Pt is calling for a refill     FOR  SYNTHROID 125 MCG tablet      [place] [address] [address] [facility] [address]  - [phone] [facility] [phone] FX      Caller#-[phone]      Please and thank you.

## 2018-12-17 ENCOUNTER — TELEPHONE (OUTPATIENT)
Dept: INTERNAL MEDICINE | Facility: CLINIC | Age: 63
End: 2018-12-17

## 2018-12-17 ENCOUNTER — OFFICE VISIT (OUTPATIENT)
Dept: INTERNAL MEDICINE | Facility: CLINIC | Age: 63
End: 2018-12-17

## 2018-12-17 VITALS
DIASTOLIC BLOOD PRESSURE: 78 MMHG | SYSTOLIC BLOOD PRESSURE: 122 MMHG | OXYGEN SATURATION: 99 % | HEIGHT: 66 IN | HEART RATE: 71 BPM | TEMPERATURE: 98.2 F | WEIGHT: 207 LBS | BODY MASS INDEX: 33.27 KG/M2

## 2018-12-17 DIAGNOSIS — G89.29 CHRONIC NECK PAIN: Primary | ICD-10-CM

## 2018-12-17 DIAGNOSIS — M54.50 ACUTE LEFT-SIDED LOW BACK PAIN WITHOUT SCIATICA: ICD-10-CM

## 2018-12-17 DIAGNOSIS — E03.9 ADULT HYPOTHYROIDISM: ICD-10-CM

## 2018-12-17 DIAGNOSIS — Z00.00 HEALTH CARE MAINTENANCE: ICD-10-CM

## 2018-12-17 DIAGNOSIS — M54.2 CHRONIC NECK PAIN: Primary | ICD-10-CM

## 2018-12-17 DIAGNOSIS — E55.9 AVITAMINOSIS D: Primary | ICD-10-CM

## 2018-12-17 PROCEDURE — 99213 OFFICE O/P EST LOW 20 MIN: CPT | Performed by: NURSE PRACTITIONER

## 2018-12-17 NOTE — TELEPHONE ENCOUNTER
----- Message from Gladis Meyer sent at 12/17/2018  9:36 AM EST -----  Pt coming in for cpe on 12/24/2018, she  has lab appt for Wednesday 12/19- need order please for labs

## 2018-12-18 NOTE — PROGRESS NOTES
Subjective   Oneida See is a 63 y.o. female who presents due to low back pain. She also has noticed an indentation in her scalp.    She c/o low back pain and stiffness after vacuuming mayela 2 weeks ago. She c/o intermittent sharp pains in low back without radicular sx, pain is worse with change in positions.  She also c/o neck pain and stiffness which has been intermittent (has responded well to PT in the past).  She also c/o indentation in her scalp over the past 1.5 weeks which is nontender, denies headaches. No recent head injury or trauma.      Back Pain   This is a new problem. The current episode started 1 to 4 weeks ago (sx began mayela 2 weeks ago). The problem occurs daily. The problem is unchanged. The pain is present in the lumbar spine. The quality of the pain is described as aching. The pain does not radiate. The symptoms are aggravated by position, sitting and standing. Pertinent negatives include no abdominal pain (no change in bowel function), bladder incontinence, bowel incontinence, chest pain, fever, headaches or weakness.        The following portions of the patient's history were reviewed and updated as appropriate: allergies, current medications, past social history and problem list.    Past Medical History:   Diagnosis Date   • Anemia    • Cervical pain 2016   • Contact dermatitis and eczema due to plant     Poison ivy   • Elevated blood-pressure reading without diagnosis of hypertension    • H/O bone density study 2016    Dr Meraz    • H/O degenerative disc disease    • H/O mammogram     13,14   • Lower back pain    • Memory change    • Multiple polyps of sigmoid colon    • Neck pain    • Pregnancy              Current Outpatient Medications:   •  B Complex Vitamins (VITAMIN B COMPLEX) tablet, Take 1 tablet by mouth Daily., Disp: 90 each, Rfl: 0  •  Cholecalciferol (VITAMIN D3) 5000 UNITS capsule capsule, Take 1 capsule by mouth Daily., Disp: 90 capsule, Rfl: 3  •   "ibuprofen (ADVIL) 200 MG tablet, Take 200 mg by mouth As Needed for Mild Pain ., Disp: , Rfl:   •  Omega-3 Fatty Acids (FISH OIL) 1000 MG capsule capsule, Take 1 capsule by mouth 2 (Two) Times a Day With Meals., Disp: 90 capsule, Rfl: 0  •  SYNTHROID 125 MCG tablet, Take 1 tablet by mouth Daily., Disp: 30 tablet, Rfl: 2  •  Vitamin Mixture (VITAMIN E COMPLETE PO), Take  by mouth., Disp: , Rfl:     Allergies   Allergen Reactions   • Penicillins        Review of Systems   Constitutional: Positive for activity change. Negative for appetite change, chills, fever and unexpected weight change.   HENT: Negative for ear pain, sinus pressure and sore throat.    Eyes: Negative for visual disturbance.   Respiratory: Negative for cough, shortness of breath and wheezing.    Cardiovascular: Negative for chest pain, palpitations and leg swelling.   Gastrointestinal: Negative for abdominal pain (no change in bowel function), blood in stool, bowel incontinence, constipation, diarrhea, nausea and vomiting.   Genitourinary: Negative for bladder incontinence, decreased urine volume, difficulty urinating (no change in bladder function), frequency, genital sores, hematuria and urgency.   Musculoskeletal: Positive for back pain and gait problem.   Skin: Negative for rash.   Neurological: Negative for dizziness, syncope, weakness, light-headedness and headaches.   Psychiatric/Behavioral: Negative for dysphoric mood.       Objective   Vitals:    12/17/18 0836   BP: 122/78   BP Location: Left arm   Patient Position: Sitting   Cuff Size: Adult   Pulse: 71   Temp: 98.2 °F (36.8 °C)   TempSrc: Oral   SpO2: 99%   Weight: 93.9 kg (207 lb)   Height: 166.4 cm (65.5\")     Physical Exam   Constitutional: She appears well-developed and well-nourished. She is cooperative. She does not have a sickly appearance. She does not appear ill.   HENT:   Head: Normocephalic.       Right Ear: Hearing, tympanic membrane and external ear normal.   Left Ear: " Hearing, tympanic membrane and external ear normal.   Nose: Nose normal. No mucosal edema, rhinorrhea, sinus tenderness or nasal deformity. Right sinus exhibits no maxillary sinus tenderness and no frontal sinus tenderness. Left sinus exhibits no maxillary sinus tenderness and no frontal sinus tenderness.   Mouth/Throat: Oropharynx is clear and moist and mucous membranes are normal. Normal dentition.   Eyes: Conjunctivae and lids are normal. Right eye exhibits no discharge and no exudate. Left eye exhibits no discharge and no exudate.   Neck: Trachea normal. Muscular tenderness present. Carotid bruit is not present. No edema present. No thyroid mass present.   Cardiovascular: Regular rhythm, normal heart sounds and normal pulses.   No murmur heard.  Pulmonary/Chest: Breath sounds normal. No respiratory distress. She has no decreased breath sounds. She has no wheezes. She has no rhonchi. She has no rales.   Musculoskeletal:        Lumbar back: She exhibits tenderness.   Lymphadenopathy:        Head (right side): No submental, no submandibular, no tonsillar, no preauricular, no posterior auricular and no occipital adenopathy present.        Head (left side): No submental, no submandibular, no tonsillar, no preauricular, no posterior auricular and no occipital adenopathy present.   Neurological: She is alert.   Skin: Skin is warm, dry and intact. No cyanosis. Nails show no clubbing.       Assessment/Plan   Oneida was seen today for hair/scalp problem and back pain.    Diagnoses and all orders for this visit:    Chronic neck pain    Acute left-sided low back pain without sciatica    I have recommended PT for her back and neck pain/stiffness which she will consider (she believes she has reached her maximum benefit for the year). Pt reassured re: benign nature of findings of scalp, suggestive of metopic suture line. She is due for her annual PE which is scheduled for next week so she may address issues with Dr. Martínez as  well.

## 2018-12-19 ENCOUNTER — LAB (OUTPATIENT)
Dept: INTERNAL MEDICINE | Facility: CLINIC | Age: 63
End: 2018-12-19

## 2018-12-19 DIAGNOSIS — Z00.00 HEALTH CARE MAINTENANCE: ICD-10-CM

## 2018-12-19 DIAGNOSIS — E55.9 AVITAMINOSIS D: ICD-10-CM

## 2018-12-19 DIAGNOSIS — E03.9 ADULT HYPOTHYROIDISM: ICD-10-CM

## 2018-12-19 PROBLEM — R31.29 MICROSCOPIC HEMATURIA: Status: ACTIVE | Noted: 2018-12-19

## 2018-12-19 LAB
25(OH)D3 SERPL-MCNC: 22.3 NG/ML (ref 30–100)
ALBUMIN SERPL-MCNC: 3.9 G/DL (ref 3.5–5.2)
ALBUMIN/GLOB SERPL: 1.6 G/DL
ALP SERPL-CCNC: 95 U/L (ref 39–117)
ALT SERPL W P-5'-P-CCNC: 17 U/L (ref 1–33)
ANION GAP SERPL CALCULATED.3IONS-SCNC: 10.1 MMOL/L
AST SERPL-CCNC: 17 U/L (ref 1–32)
BACTERIA UR QL AUTO: ABNORMAL /HPF
BASOPHILS # BLD AUTO: 0.03 10*3/MM3 (ref 0–0.2)
BASOPHILS NFR BLD AUTO: 0.7 % (ref 0–2)
BILIRUB SERPL-MCNC: 0.5 MG/DL (ref 0.1–1.2)
BILIRUB UR QL STRIP: NEGATIVE
BUN BLD-MCNC: 12 MG/DL (ref 8–23)
BUN/CREAT SERPL: 17.1 (ref 7–25)
CALCIUM SPEC-SCNC: 9 MG/DL (ref 8.6–10.5)
CHLORIDE SERPL-SCNC: 107 MMOL/L (ref 98–107)
CHOLEST SERPL-MCNC: 194 MG/DL (ref 0–200)
CLARITY UR: CLEAR
CO2 SERPL-SCNC: 26.9 MMOL/L (ref 22–29)
COLOR UR: YELLOW
CREAT BLD-MCNC: 0.7 MG/DL (ref 0.57–1)
DEPRECATED RDW RBC AUTO: 40.5 FL (ref 37–54)
EOSINOPHIL # BLD AUTO: 0.13 10*3/MM3 (ref 0–0.7)
EOSINOPHIL NFR BLD AUTO: 2.9 % (ref 0–5)
ERYTHROCYTE [DISTWIDTH] IN BLOOD BY AUTOMATED COUNT: 12.1 % (ref 11.5–15)
GFR SERPL CREATININE-BSD FRML MDRD: 84 ML/MIN/1.73
GLOBULIN UR ELPH-MCNC: 2.5 GM/DL
GLUCOSE BLD-MCNC: 81 MG/DL (ref 65–99)
GLUCOSE UR STRIP-MCNC: NEGATIVE MG/DL
HCT VFR BLD AUTO: 42 % (ref 34.1–44.9)
HDLC SERPL-MCNC: 68 MG/DL (ref 40–60)
HGB BLD-MCNC: 14.1 G/DL (ref 11.2–15.7)
HGB UR QL STRIP.AUTO: ABNORMAL
HYALINE CASTS UR QL AUTO: ABNORMAL /LPF
KETONES UR QL STRIP: NEGATIVE
LDLC SERPL CALC-MCNC: 106 MG/DL (ref 0–100)
LDLC/HDLC SERPL: 1.56 {RATIO}
LEUKOCYTE ESTERASE UR QL STRIP.AUTO: NEGATIVE
LYMPHOCYTES # BLD AUTO: 1.34 10*3/MM3 (ref 0.8–7)
LYMPHOCYTES NFR BLD AUTO: 29.5 % (ref 10–60)
MCH RBC QN AUTO: 31.3 PG (ref 26–34)
MCHC RBC AUTO-ENTMCNC: 33.6 G/DL (ref 31–37)
MCV RBC AUTO: 93.1 FL (ref 80–100)
MONOCYTES # BLD AUTO: 0.42 10*3/MM3 (ref 0–1)
MONOCYTES NFR BLD AUTO: 9.3 % (ref 0–13)
NEUTROPHILS # BLD AUTO: 2.62 10*3/MM3 (ref 1–11)
NEUTROPHILS NFR BLD AUTO: 57.6 % (ref 30–85)
NITRITE UR QL STRIP: NEGATIVE
PH UR STRIP.AUTO: 5.5 [PH] (ref 5–8)
PLATELET # BLD AUTO: 272 10*3/MM3 (ref 150–450)
PMV BLD AUTO: 10.7 FL (ref 6–12)
POTASSIUM BLD-SCNC: 4.4 MMOL/L (ref 3.5–5.2)
PROT SERPL-MCNC: 6.4 G/DL (ref 6–8.5)
PROT UR QL STRIP: NEGATIVE
RBC # BLD AUTO: 4.51 10*6/MM3 (ref 3.93–5.22)
RBC # UR: ABNORMAL /HPF
REF LAB TEST METHOD: ABNORMAL
SODIUM BLD-SCNC: 144 MMOL/L (ref 136–145)
SP GR UR STRIP: 1.02 (ref 1–1.03)
SQUAMOUS #/AREA URNS HPF: ABNORMAL /HPF
T4 FREE SERPL-MCNC: 1.31 NG/DL (ref 0.93–1.7)
TRIGL SERPL-MCNC: 99 MG/DL (ref 0–150)
TSH SERPL-ACNC: 0.75 MIU/ML (ref 0.27–4.2)
UROBILINOGEN UR QL STRIP: ABNORMAL
VLDLC SERPL-MCNC: 19.8 MG/DL (ref 5–40)
WBC NRBC COR # BLD: 4.54 10*3/MM3 (ref 5–10)
WBC UR QL AUTO: ABNORMAL /HPF

## 2018-12-19 PROCEDURE — 81001 URINALYSIS AUTO W/SCOPE: CPT | Performed by: INTERNAL MEDICINE

## 2018-12-19 PROCEDURE — 80053 COMPREHEN METABOLIC PANEL: CPT | Performed by: INTERNAL MEDICINE

## 2018-12-19 PROCEDURE — 80061 LIPID PANEL: CPT | Performed by: INTERNAL MEDICINE

## 2018-12-19 PROCEDURE — 85025 COMPLETE CBC W/AUTO DIFF WBC: CPT | Performed by: INTERNAL MEDICINE

## 2018-12-24 ENCOUNTER — OFFICE VISIT (OUTPATIENT)
Dept: INTERNAL MEDICINE | Facility: CLINIC | Age: 63
End: 2018-12-24

## 2018-12-24 VITALS
DIASTOLIC BLOOD PRESSURE: 74 MMHG | RESPIRATION RATE: 14 BRPM | WEIGHT: 204 LBS | SYSTOLIC BLOOD PRESSURE: 122 MMHG | BODY MASS INDEX: 32.78 KG/M2 | HEIGHT: 66 IN

## 2018-12-24 DIAGNOSIS — Z12.31 VISIT FOR SCREENING MAMMOGRAM: ICD-10-CM

## 2018-12-24 DIAGNOSIS — Z00.00 HEALTH CARE MAINTENANCE: Primary | ICD-10-CM

## 2018-12-24 DIAGNOSIS — E63.9 INADEQUATE DIETARY INTAKE: ICD-10-CM

## 2018-12-24 LAB
SPECIMEN STATUS: NORMAL
VIT B12 SERPL-MCNC: 368 PG/ML (ref 211–946)

## 2018-12-24 PROCEDURE — 99396 PREV VISIT EST AGE 40-64: CPT | Performed by: INTERNAL MEDICINE

## 2018-12-24 NOTE — PATIENT INSTRUCTIONS
Risk evaluation:  1. Cardiovascular risk factors: family history of CAD   2. Diabetes risk factors: none.  3. Cancer risk factors: FH of breast cancer, h/o tocacco smoking and personal h/o colon polyp.  4. Risky behavior: none. Use of seat belts: regular. Use of sunscreens: regular. SPF 30.   Tattoos: none.  H/o blood transfusions/organ transplants before 1992 or clotting factor transfusion before 1987: none.     Prevention:  Cholesterol test  up to date. Cholesterol is normal..  Blood sugar test up to date. Fasting blood sugar  is normal..  Hep C testing (for patients born [birth year]87[birth year]4946): completed..  Mammogram up to date. Recommended every year.. Breast self exams recommended once a month.  Colonoscopy: up to date. Recommended every 5 years. Next screening is recommended in 2021..  PAP smear : up to date. Recommendations per GYN..  DEXA : up to date. Recommended every 5 years. Next screening is due in 2021..                      Hypothyroidism - well compensated with current dose of thyroid supplement. Patient is euthyroid clinically and TSH is normal. Continue same. Reminder: thyroid supplement has to be taken at least 2 hours apart from Ca and Iron supplements.  Vitamin D deficiency - not well compensated without over the counter Vit D3. Take over the counter Vit D3 2000 IU every day. This supplement is fat-soluble and has to be taken with meals.  Vegetarian diet - will check Vit B12 blood level.

## 2018-12-24 NOTE — PROGRESS NOTES
"Subjective   Oneida See is a 63 y.o. female.     History of Present Illness   /74 (BP Location: Left arm, Patient Position: Sitting, Cuff Size: Adult)   Resp 14   Ht 166.4 cm (65.5\")   Wt 92.5 kg (204 lb)   BMI 33.43 kg/m²   Patient is here for yearly CPE. Last CPE was  1 year ago.  PMH, SH and FH reviewed. Changes in the FH: none .  Patient rates her own health as: good.  Tobacco use: in remote past, as per SH.  Alcohol use:none.  Recreational drugs use: none  Medication list rewieved.  Diet: vegetarian.  Exercise: 5-6 days a week and walking.  Marital status: .   Employment: retired.  Patient rates her stress level as: low.  Dental health: good. Brushes teeth 2 times a day, flosses 1 time a day . Dental visits: 2 times a year .  Vision correction: glasses  Hearing: normal.    Recent vaccinations:   Flu discussed and recommended, but patient declines  Tdap  is up to date  Shingles prevention discussed and recommended to get Shindrix at [facility]    Patient is postmenopausal. Past pregnancies: . Currently patient is on no HRT .      Current Outpatient Medications:   •  B Complex Vitamins (VITAMIN B COMPLEX) tablet, Take 1 tablet by mouth Daily., Disp: 90 each, Rfl: 0  •  Cholecalciferol (VITAMIN D3) 5000 UNITS capsule capsule, Take 1 capsule by mouth Daily., Disp: 90 capsule, Rfl: 3  •  ibuprofen (ADVIL) 200 MG tablet, Take 200 mg by mouth As Needed for Mild Pain ., Disp: , Rfl:   •  Omega-3 Fatty Acids (FISH OIL) 1000 MG capsule capsule, Take 1 capsule by mouth 2 (Two) Times a Day With Meals., Disp: 90 capsule, Rfl: 0  •  SYNTHROID 125 MCG tablet, Take 1 tablet by mouth Daily., Disp: 30 tablet, Rfl: 2  •  Vitamin Mixture (VITAMIN E COMPLETE PO), Take  by mouth., Disp: , Rfl:           The following portions of the patient's history were reviewed and updated as appropriate: allergies, current medications, past family history, past medical history, past social history, past surgical history " and problem list.    Review of Systems   Constitutional: Negative for chills and fever.   HENT: Negative for postnasal drip, sinus pressure and sore throat.    Eyes: Negative for pain and itching.   Respiratory: Negative for cough and chest tightness.    Cardiovascular: Negative for chest pain and leg swelling.   Gastrointestinal: Negative for abdominal pain and blood in stool.   Endocrine: Negative for cold intolerance and heat intolerance.   Genitourinary: Negative for difficulty urinating and flank pain.   Musculoskeletal: Positive for back pain and neck pain.   Skin: Negative for color change and rash.   Neurological: Negative for dizziness, weakness and headaches.   Hematological: Negative for adenopathy. Does not bruise/bleed easily.   Psychiatric/Behavioral: Negative for sleep disturbance. The patient is not nervous/anxious.        Objective   Physical Exam   Constitutional: She is oriented to person, place, and time. She appears well-developed and well-nourished. No distress.   HENT:   Head: Normocephalic and atraumatic.   Right Ear: External ear normal.   Left Ear: External ear normal.   Nose: Nose normal.   Mouth/Throat: Oropharynx is clear and moist. No oropharyngeal exudate.   Eyes: Conjunctivae and EOM are normal. Pupils are equal, round, and reactive to light. Right eye exhibits no discharge. Left eye exhibits no discharge. No scleral icterus.   Neck: Normal range of motion. Neck supple. No JVD present. No thyromegaly present.   Cardiovascular: Normal rate, regular rhythm, S1 normal, S2 normal, normal heart sounds and intact distal pulses. Exam reveals no gallop and no friction rub.   No murmur heard.  Pulmonary/Chest: Effort normal and breath sounds normal. No respiratory distress. She has no decreased breath sounds. She has no wheezes. She has no rhonchi. She has no rales. Right breast exhibits no inverted nipple, no mass, no nipple discharge, no skin change and no tenderness. Left breast exhibits no  inverted nipple, no mass, no nipple discharge, no skin change and no tenderness. Breasts are symmetrical.   Abdominal: Soft. Bowel sounds are normal. She exhibits no distension and no mass. There is no tenderness. There is no rebound and no guarding.   Musculoskeletal: She exhibits no edema.   Lymphadenopathy:        Head (right side): No submental, no submandibular, no preauricular, no posterior auricular and no occipital adenopathy present.        Head (left side): No submental, no submandibular, no preauricular, no posterior auricular and no occipital adenopathy present.     She has no cervical adenopathy.        Right cervical: No superficial cervical, no deep cervical and no posterior cervical adenopathy present.       Left cervical: No superficial cervical, no deep cervical and no posterior cervical adenopathy present.     She has no axillary adenopathy.        Right: No supraclavicular adenopathy present.        Left: No supraclavicular adenopathy present.   Neurological: She is alert and oriented to person, place, and time. She has normal reflexes. She displays normal reflexes. No cranial nerve deficit. She exhibits normal muscle tone. Coordination normal.   Reflex Scores:       Bicep reflexes are 2+ on the right side and 2+ on the left side.       Patellar reflexes are 2+ on the right side and 2+ on the left side.  Skin: Skin is warm. No rash noted. She is not diaphoretic. No erythema.   Psychiatric: She has a normal mood and affect. Her behavior is normal. Thought content normal.   Vitals reviewed.      Assessment/Plan   Oneida was seen today for annual exam.    Diagnoses and all orders for this visit:    Health care maintenance    Inadequate dietary intake  -     Vitamin B12; Future  -     Vitamin B12    Visit for screening mammogram  -     Mammo Screening Bilateral With CAD; Future        Risk evaluation:  1. Cardiovascular risk factors: family history of CAD   2. Diabetes risk factors: none.  3. Cancer  risk factors: FH of breast cancer, h/o tocacco smoking and personal h/o colon polyp.  4. Risky behavior: none. Use of seat belts: regular. Use of sunscreens: regular. SPF 30.   Tattoos: none.  H/o blood transfusions/organ transplants before 1992 or clotting factor transfusion before 1987: none.     Prevention:  Cholesterol test  up to date. Cholesterol is normal..  Blood sugar test up to date. Fasting blood sugar  is normal..  Hep C testing (for patients born [birth year]49[birth year]): completed..  Mammogram up to date. Recommended every year.. Breast self exams recommended once a month.  Colonoscopy: up to date. Recommended every 5 years. Next screening is recommended in 2021..  PAP smear : up to date. Recommendations per GYN..  DEXA : up to date. Recommended every 5 years. Next screening is due in 2021..                      Hypothyroidism - well compensated with current dose of thyroid supplement. Patient is euthyroid clinically and TSH is normal. Continue same. Reminder: thyroid supplement has to be taken at least 2 hours apart from Ca and Iron supplements.  Vitamin D deficiency - not well compensated without over the counter Vit D3. Take over the counter Vit D3 2000 IU every day. This supplement is fat-soluble and has to be taken with meals.  Vegetarian diet - will check Vit B12 blood level.

## 2018-12-25 NOTE — PROGRESS NOTES
Please call patient:Vit B12 is in the normal range, but low normal. Take Vit B complex daily or just Vit B12 1000 mcg a day OTC

## 2018-12-26 LAB — REF LAB TEST METHOD: NORMAL

## 2019-01-02 DIAGNOSIS — E03.9 PRIMARY HYPOTHYROIDISM: ICD-10-CM

## 2019-01-02 RX ORDER — LEVOTHYROXINE SODIUM 125 MCG
125 TABLET ORAL DAILY
Qty: 30 TABLET | Refills: 5 | Status: SHIPPED | OUTPATIENT
Start: 2019-01-02 | End: 2019-03-01 | Stop reason: SDUPTHER

## 2019-02-14 ENCOUNTER — TELEPHONE (OUTPATIENT)
Dept: INTERNAL MEDICINE | Facility: CLINIC | Age: 64
End: 2019-02-14

## 2019-02-14 DIAGNOSIS — M54.2 CHRONIC NECK PAIN: Primary | ICD-10-CM

## 2019-02-14 DIAGNOSIS — G89.29 CHRONIC NECK PAIN: Primary | ICD-10-CM

## 2019-02-14 NOTE — TELEPHONE ENCOUNTER
----- Message from Archana Rodriguez sent at 2/14/2019  8:06 AM EST -----  Contact: Patient  Patient would like to know if Dr. Martínez would send over a referral for PT to [facility]. She is having problems with her neck. Please advise. Please advise    Patient:[phone]

## 2019-03-01 DIAGNOSIS — E03.9 PRIMARY HYPOTHYROIDISM: ICD-10-CM

## 2019-03-01 RX ORDER — LEVOTHYROXINE SODIUM 125 MCG
125 TABLET ORAL DAILY
Qty: 90 TABLET | Refills: 1 | Status: SHIPPED | OUTPATIENT
Start: 2019-03-01 | End: 2019-03-05 | Stop reason: SDUPTHER

## 2019-03-05 ENCOUNTER — TELEPHONE (OUTPATIENT)
Dept: INTERNAL MEDICINE | Facility: CLINIC | Age: 64
End: 2019-03-05

## 2019-03-05 DIAGNOSIS — E03.9 PRIMARY HYPOTHYROIDISM: ICD-10-CM

## 2019-03-05 RX ORDER — LEVOTHYROXINE SODIUM 125 MCG
125 TABLET ORAL DAILY
Qty: 30 TABLET | Refills: 0 | Status: SHIPPED | OUTPATIENT
Start: 2019-03-05 | End: 2019-03-11 | Stop reason: SDUPTHER

## 2019-03-05 NOTE — TELEPHONE ENCOUNTER
----- Message from Lilli Bryson sent at 3/5/2019  8:08 AM EST -----  Contact: Pt   Pt is calling for a refill     FOR  SYNTHROID 125 MCG tablet-- NEEDS SHORT SUPPLY WHILE MAIL ORDER IS ON THE WAY    Patient [facility]   [facility] [address] [facility] [address] [facility] [phone] [facility] [phone] FX    Caller# [phone]     Please and thank you.

## 2019-03-11 DIAGNOSIS — E03.9 PRIMARY HYPOTHYROIDISM: ICD-10-CM

## 2019-03-11 RX ORDER — LEVOTHYROXINE SODIUM 125 MCG
125 TABLET ORAL DAILY
Qty: 90 TABLET | Refills: 3 | Status: SHIPPED | OUTPATIENT
Start: 2019-03-11 | End: 2020-05-12

## 2019-05-02 ENCOUNTER — OFFICE VISIT (OUTPATIENT)
Dept: INTERNAL MEDICINE | Facility: CLINIC | Age: 64
End: 2019-05-02

## 2019-05-02 VITALS
SYSTOLIC BLOOD PRESSURE: 114 MMHG | DIASTOLIC BLOOD PRESSURE: 74 MMHG | OXYGEN SATURATION: 98 % | BODY MASS INDEX: 34.87 KG/M2 | WEIGHT: 217 LBS | HEIGHT: 66 IN | HEART RATE: 86 BPM

## 2019-05-02 DIAGNOSIS — R10.11 RIGHT UPPER QUADRANT PAIN: Primary | ICD-10-CM

## 2019-05-02 PROCEDURE — 99213 OFFICE O/P EST LOW 20 MIN: CPT | Performed by: NURSE PRACTITIONER

## 2019-05-02 NOTE — PROGRESS NOTES
Subjective   Oneida See is a 64 y.o. female who presents due to right upper quadrant pain.    She c/o tenderness in the right upper quadrant which began this morning, denies nausea/vomiting. No change in bowel patterns. Pain is worse with movement. She was busy yesterday cleaning her house yesterday with pushing/pulling, denies acute injury or trauma.      Abdominal Pain   This is a new problem. The current episode started today. The onset quality is sudden. The problem occurs intermittently. The problem has been waxing and waning. The pain is located in the RUQ. The pain is mild. The quality of the pain is dull. The abdominal pain does not radiate. Pertinent negatives include no arthralgias, constipation, diarrhea, dysuria, fever, frequency, headaches, myalgias, nausea or vomiting. The pain is aggravated by movement, coughing and deep breathing. The pain is relieved by recumbency. She has tried nothing for the symptoms.        The following portions of the patient's history were reviewed and updated as appropriate: allergies, current medications, past social history and problem list.    Past Medical History:   Diagnosis Date   • Anemia    • Cervical pain 2016   • Contact dermatitis and eczema due to plant     Poison ivy   • Elevated blood-pressure reading without diagnosis of hypertension    • H/O bone density study 2016    Dr Meraz    • H/O degenerative disc disease    • H/O mammogram     13,14   • Lower back pain    • Memory change    • Multiple polyps of sigmoid colon    • Neck pain    • Pregnancy              Current Outpatient Medications:   •  B Complex Vitamins (VITAMIN B COMPLEX) tablet, Take 1 tablet by mouth Daily., Disp: 90 each, Rfl: 0  •  Cholecalciferol (VITAMIN D3) 5000 UNITS capsule capsule, Take 1 capsule by mouth Daily., Disp: 90 capsule, Rfl: 3  •  ibuprofen (ADVIL) 200 MG tablet, Take 200 mg by mouth As Needed for Mild Pain ., Disp: , Rfl:   •  Omega-3 Fatty Acids  "(FISH OIL) 1000 MG capsule capsule, Take 1 capsule by mouth 2 (Two) Times a Day With Meals., Disp: 90 capsule, Rfl: 0  •  SYNTHROID 125 MCG tablet, Take 1 tablet by mouth Daily., Disp: 90 tablet, Rfl: 3  •  Vitamin Mixture (VITAMIN E COMPLETE PO), Take  by mouth., Disp: , Rfl:     Allergies   Allergen Reactions   • Penicillins        Review of Systems   Constitutional: Negative for chills, fatigue, fever and unexpected weight change.   HENT: Negative for congestion, ear pain, postnasal drip, sinus pressure, sore throat and trouble swallowing.    Eyes: Negative for visual disturbance.   Respiratory: Negative for cough, chest tightness and wheezing.    Cardiovascular: Negative for chest pain, palpitations and leg swelling.   Gastrointestinal: Positive for abdominal pain. Negative for blood in stool, constipation, diarrhea, nausea and vomiting.   Genitourinary: Negative for dysuria, frequency and urgency.   Musculoskeletal: Negative for arthralgias, joint swelling and myalgias.   Skin: Negative for color change.   Neurological: Negative for syncope, weakness and headaches.   Hematological: Does not bruise/bleed easily.       Objective   Vitals:    05/02/19 1442 05/02/19 1459   BP: 124/98 114/74   BP Location: Left arm Left arm   Patient Position: Sitting    Pulse: 86    SpO2: 98%    Weight: 98.4 kg (217 lb)    Height: 166.4 cm (65.51\")      Physical Exam   Constitutional: She appears well-developed and well-nourished. She is cooperative. She does not have a sickly appearance. She does not appear ill.   HENT:   Head: Normocephalic.   Right Ear: Hearing, tympanic membrane and external ear normal.   Left Ear: Hearing, tympanic membrane and external ear normal.   Nose: Nose normal. No mucosal edema, rhinorrhea, sinus tenderness or nasal deformity. Right sinus exhibits no maxillary sinus tenderness and no frontal sinus tenderness. Left sinus exhibits no maxillary sinus tenderness and no frontal sinus tenderness. "   Mouth/Throat: Oropharynx is clear and moist and mucous membranes are normal. Normal dentition.   Eyes: Conjunctivae and lids are normal. Right eye exhibits no discharge and no exudate. Left eye exhibits no discharge and no exudate.   Neck: Trachea normal. Carotid bruit is not present. No edema present. No thyroid mass present.   Cardiovascular: Regular rhythm, normal heart sounds and normal pulses.   No murmur heard.  Pulmonary/Chest: Breath sounds normal. No respiratory distress. She has no decreased breath sounds. She has no wheezes. She has no rhonchi. She has no rales.   Abdominal: Soft. Normal appearance and bowel sounds are normal. There is tenderness in the right upper quadrant. There is no rebound and no guarding.   Lymphadenopathy:        Head (right side): No submental, no submandibular, no tonsillar, no preauricular, no posterior auricular and no occipital adenopathy present.        Head (left side): No submental, no submandibular, no tonsillar, no preauricular, no posterior auricular and no occipital adenopathy present.   Neurological: She is alert.   Skin: Skin is warm, dry and intact. No cyanosis. Nails show no clubbing.       Assessment/Plan   Oneida was seen today for abdominal pain.    Diagnoses and all orders for this visit:    Right upper quadrant pain  -     US Gallbladder; Future    Discussed sx with patient and possibility of gallbladder disease. However, she does not have dyspepsia and pain is worse with movement rather than food. She will rest area and apply heat, consider further evaluation if sx persist/worsen.    Advised to monitor for nausea/vomiting and/or food intolerance.     Pt came into office 5/3 due to worsening pain which she now describes as sharp, will send for gallbladder ultrasound and follow results.

## 2019-05-03 ENCOUNTER — HOSPITAL ENCOUNTER (OUTPATIENT)
Dept: GENERAL RADIOLOGY | Facility: HOSPITAL | Age: 64
Discharge: HOME | End: 2019-05-03

## 2019-05-03 ENCOUNTER — HOSPITAL ENCOUNTER (OUTPATIENT)
Dept: ULTRASOUND IMAGING | Facility: HOSPITAL | Age: 64
Discharge: HOME | End: 2019-05-03
Admitting: NURSE PRACTITIONER

## 2019-05-03 DIAGNOSIS — R10.11 RIGHT UPPER QUADRANT PAIN: ICD-10-CM

## 2019-05-03 DIAGNOSIS — R07.9 CHEST PAIN OF UNCERTAIN ETIOLOGY: Primary | ICD-10-CM

## 2019-05-03 PROCEDURE — 76705 ECHO EXAM OF ABDOMEN: CPT

## 2019-05-03 PROCEDURE — 71046 X-RAY EXAM CHEST 2 VIEWS: CPT

## 2019-05-03 RX ORDER — MELOXICAM 15 MG/1
15 TABLET ORAL DAILY
Qty: 30 TABLET | Refills: 0 | Status: SHIPPED | OUTPATIENT
Start: 2019-05-03 | End: 2020-12-21

## 2019-05-03 NOTE — PROGRESS NOTES
Pt continues to c/o right upper quadrant pain which is now extending into the right side of her chest. Pain is worse with inspiration. Will send for CXR and start Meloxicam daily.

## 2019-05-06 ENCOUNTER — TELEPHONE (OUTPATIENT)
Dept: INTERNAL MEDICINE | Facility: CLINIC | Age: 64
End: 2019-05-06

## 2019-05-06 NOTE — TELEPHONE ENCOUNTER
----- Message from Tami Moreland sent at 5/6/2019 10:32 AM EDT -----  Contact: Patient  Patient inquiring if she can take Colace with her ongoing problems and with the meds she is on.  Please advise.    Patient:  [phone]

## 2019-05-07 ENCOUNTER — OFFICE VISIT (OUTPATIENT)
Dept: INTERNAL MEDICINE | Facility: CLINIC | Age: 64
End: 2019-05-07

## 2019-05-07 VITALS
DIASTOLIC BLOOD PRESSURE: 62 MMHG | HEIGHT: 66 IN | SYSTOLIC BLOOD PRESSURE: 118 MMHG | BODY MASS INDEX: 34.39 KG/M2 | WEIGHT: 214 LBS | RESPIRATION RATE: 14 BRPM

## 2019-05-07 DIAGNOSIS — R10.9 FLANK PAIN: ICD-10-CM

## 2019-05-07 DIAGNOSIS — R10.31 RIGHT LOWER QUADRANT ABDOMINAL PAIN: Primary | ICD-10-CM

## 2019-05-07 DIAGNOSIS — R82.90 ABNORMAL URINE FINDINGS: ICD-10-CM

## 2019-05-07 DIAGNOSIS — R07.89 CHEST WALL PAIN: ICD-10-CM

## 2019-05-07 DIAGNOSIS — B96.20 E. COLI UTI: ICD-10-CM

## 2019-05-07 DIAGNOSIS — K59.01 SLOW TRANSIT CONSTIPATION: ICD-10-CM

## 2019-05-07 DIAGNOSIS — R10.11 RIGHT UPPER QUADRANT ABDOMINAL PAIN: ICD-10-CM

## 2019-05-07 DIAGNOSIS — N39.0 E. COLI UTI: ICD-10-CM

## 2019-05-07 LAB
BACTERIA UR QL AUTO: ABNORMAL /HPF
BILIRUB UR QL STRIP: NEGATIVE
CLARITY UR: ABNORMAL
COLOR UR: YELLOW
GLUCOSE UR STRIP-MCNC: NEGATIVE MG/DL
HGB UR QL STRIP.AUTO: ABNORMAL
HYALINE CASTS UR QL AUTO: ABNORMAL /LPF
KETONES UR QL STRIP: ABNORMAL
LEUKOCYTE ESTERASE UR QL STRIP.AUTO: ABNORMAL
MUCOUS THREADS URNS QL MICRO: ABNORMAL /HPF
NITRITE UR QL STRIP: POSITIVE
PH UR STRIP.AUTO: 5.5 [PH] (ref 5–8)
PROT UR QL STRIP: NEGATIVE
RBC # UR: ABNORMAL /HPF
REF LAB TEST METHOD: ABNORMAL
SP GR UR STRIP: >=1.03 (ref 1–1.03)
SQUAMOUS #/AREA URNS HPF: ABNORMAL /HPF
UROBILINOGEN UR QL STRIP: ABNORMAL
WBC UR QL AUTO: ABNORMAL /HPF

## 2019-05-07 PROCEDURE — 99214 OFFICE O/P EST MOD 30 MIN: CPT | Performed by: INTERNAL MEDICINE

## 2019-05-07 PROCEDURE — 81001 URINALYSIS AUTO W/SCOPE: CPT | Performed by: INTERNAL MEDICINE

## 2019-05-10 LAB
BACTERIA UR CULT: ABNORMAL
Lab: ABNORMAL
SUSCEPTIBILITY TESTING: ABNORMAL

## 2019-05-10 RX ORDER — SULFAMETHOXAZOLE AND TRIMETHOPRIM 800; 160 MG/1; MG/1
1 TABLET ORAL 2 TIMES DAILY
Qty: 10 TABLET | Refills: 0 | Status: SHIPPED | OUTPATIENT
Start: 2019-05-10 | End: 2019-05-15

## 2019-05-30 DIAGNOSIS — N39.0 RECURRENT UTI: Primary | ICD-10-CM

## 2019-06-19 DIAGNOSIS — Z00.00 HEALTH CARE MAINTENANCE: ICD-10-CM

## 2019-06-19 DIAGNOSIS — E03.9 ADULT HYPOTHYROIDISM: ICD-10-CM

## 2019-06-19 DIAGNOSIS — E55.9 AVITAMINOSIS D: Primary | ICD-10-CM

## 2019-06-19 PROBLEM — R20.2 NUMBNESS AND TINGLING IN BOTH HANDS: Status: RESOLVED | Noted: 2018-05-03 | Resolved: 2019-06-19

## 2019-06-19 PROBLEM — R20.0 NUMBNESS AND TINGLING IN BOTH HANDS: Status: RESOLVED | Noted: 2018-05-03 | Resolved: 2019-06-19

## 2019-06-19 PROBLEM — R10.11 RIGHT UPPER QUADRANT ABDOMINAL PAIN: Status: RESOLVED | Noted: 2019-05-07 | Resolved: 2019-06-19

## 2019-08-26 ENCOUNTER — OFFICE VISIT (OUTPATIENT)
Dept: INTERNAL MEDICINE | Facility: CLINIC | Age: 64
End: 2019-08-26

## 2019-08-26 VITALS
WEIGHT: 212 LBS | SYSTOLIC BLOOD PRESSURE: 110 MMHG | RESPIRATION RATE: 14 BRPM | DIASTOLIC BLOOD PRESSURE: 70 MMHG | BODY MASS INDEX: 34.07 KG/M2 | HEIGHT: 66 IN

## 2019-08-26 DIAGNOSIS — Z78.9 VEGETARIAN DIET: ICD-10-CM

## 2019-08-26 DIAGNOSIS — Z00.00 HEALTH CARE MAINTENANCE: Primary | ICD-10-CM

## 2019-08-26 DIAGNOSIS — Z78.0 POST-MENOPAUSE: ICD-10-CM

## 2019-08-26 DIAGNOSIS — G89.29 CHRONIC NECK PAIN: ICD-10-CM

## 2019-08-26 DIAGNOSIS — Z12.31 VISIT FOR SCREENING MAMMOGRAM: ICD-10-CM

## 2019-08-26 DIAGNOSIS — M54.2 CHRONIC NECK PAIN: ICD-10-CM

## 2019-08-26 DIAGNOSIS — N39.0 RECURRENT UTI: ICD-10-CM

## 2019-08-26 DIAGNOSIS — Z13.6 SCREENING FOR CARDIOVASCULAR CONDITION: ICD-10-CM

## 2019-08-26 LAB
BACTERIA UR QL AUTO: ABNORMAL /HPF
BILIRUB UR QL STRIP: NEGATIVE
CLARITY UR: CLEAR
COLOR UR: YELLOW
GLUCOSE UR STRIP-MCNC: NEGATIVE MG/DL
HGB UR QL STRIP.AUTO: ABNORMAL
HYALINE CASTS UR QL AUTO: ABNORMAL /LPF
KETONES UR QL STRIP: ABNORMAL
LEUKOCYTE ESTERASE UR QL STRIP.AUTO: NEGATIVE
MUCOUS THREADS URNS QL MICRO: ABNORMAL /HPF
NITRITE UR QL STRIP: NEGATIVE
PH UR STRIP.AUTO: <=5 [PH] (ref 5–8)
PROT UR QL STRIP: NEGATIVE
RBC # UR: ABNORMAL /HPF
REF LAB TEST METHOD: ABNORMAL
SP GR UR STRIP: >=1.03 (ref 1–1.03)
SQUAMOUS #/AREA URNS HPF: ABNORMAL /HPF
UROBILINOGEN UR QL STRIP: ABNORMAL
WBC UR QL AUTO: ABNORMAL /HPF

## 2019-08-26 PROCEDURE — 96160 PT-FOCUSED HLTH RISK ASSMT: CPT | Performed by: INTERNAL MEDICINE

## 2019-08-26 PROCEDURE — 81001 URINALYSIS AUTO W/SCOPE: CPT | Performed by: INTERNAL MEDICINE

## 2019-08-26 PROCEDURE — G0402 INITIAL PREVENTIVE EXAM: HCPCS | Performed by: INTERNAL MEDICINE

## 2019-08-26 PROCEDURE — G0403 EKG FOR INITIAL PREVENT EXAM: HCPCS | Performed by: INTERNAL MEDICINE

## 2019-08-26 NOTE — PATIENT INSTRUCTIONS
Annual wellness visit with preventive exam as well as age and risk appropriate councelling completed.    Cardiovascular disease councelling: current. Recommended: will check fasting blood sugar and cholestetol.  Diabetes screening and councelling: current. Recommended: I am concernes about her insulin resistance as evidenced by the skin changes - needs to exercise regularly and to loose some weight.  Breast cancer screening: is due. Will schedule..  Osteoporosis screening: is due, will schedule. Benefits explained..  Glaucoma screening: up to date.   AAA screening: not needed..  Hep C screening (born between [birth year]) completed..  Colorectal cancer screening: up to date. Recommended every 5 years. Next screening is recommended in 2021..    Immunizations:   1. Influenza vaccine  is up to date and recommended yearly.   2. Pneumococcal vaccines: discussed and recommended, but patient declines.   3. Shingles prevention: discussed and recommended to get Shindrix at the pharmacy.      Advanced directives planning: not completed. The purpose and whole concept of Living Will explained. I had encouraged patient to discuss the issue with family and document personal wishes and preferences in a form of Living Will..    Medications reviewed and medication list updated.   Potential harmful drug-disease interactions in the elderly:none.  High risk medication in elderly: none  ASA use: no indications.

## 2019-08-26 NOTE — PROGRESS NOTES
"Jimmy See is a 64 y.o. female.     History of Present Illness      /70 (BP Location: Left arm, Patient Position: Sitting, Cuff Size: Adult)   Resp 14   Ht 166.4 cm (65.5\")   Wt 96.2 kg (212 lb)   BMI 34.74 kg/m²   Patient is being seen for subsequent wellness visit.  Hospitalizations in a past: 2, all GYN  Once per lifetime Medicare screening tests: ECG - not done yet    AAA risk assessment: 1. FH: none. 2.H/o tobacco smoking: in remote past, as per . 3. CV or PAD: none.    Tobacco use: in remote past, as per    Alcohol use: occasionally  Illicit drugs use: none  Diet: vegetarian  Exercise: 5-6 days a week and walking. Has Silver Sneakers, just signed up for [employer].    Anxiety screening: How many days in the last 2 weeks you were  1. Feeling anxious, nervous, on edge: none  2. Unable to stop worrying: none  3. Worrying too much about different things: none  4. Having problems relaxing:none  5. Feeling restless or unable to sit still:none  6. Feeling irritable or easely annoyed: none  7. Being afraid that something awful might happen: none    Depression screening PHQ9:  Over the past 2 weeks how often have you been bothered by  1. Lack of interest or pleasuer in usual activities: none  2. Feeling down, depressed, hopeless:none  3. Troubles falling asleep/sleeping too long:none  4. Feeling tired, having little energy: none  5. Poor appetite or overeating: none  6. Feeling bad about yourself:none.  7. Trouble concentrating: at the baseline.  8. Moving or speaking too slow or much faster than usual: none  9. Thoughts about harming yourself:none.    Cognitive impairment screening:   Do you have difficulties with:  1. Language: no  2. Behavior: no  3. Learning/retaining new information: no  4. Handling complex tasks: no  5. Reasoning: no  6. Spacial ability and orientation: no    Hearing: normal.  Driving: unrestricted  ADL: independent  ADL details: Does patient needs help " with:  telephone use: no  Transportation: no  Housework: no  Shopping: no  meal preparation: no  laundry: no  managing medication:no  Participate in the social activities: Y    Fall risk factors:   1.Use of alcohol: yes    2.Polypharmacy: no  3.H/o of previous fall:  no  4. Mobility impairment:  no  5. Visual impairment:  no  6. Deconditioning: no  7. Use of antihypertensive medication:  no  8. Use of sedatives: no  9. Use of antidepressant: no    Home safety:   1.loose rugs: no   2.unfamiliar environment: no   3. Uneven floors: no   4. No grab bars in the bathroom: no  5. No banister on stairs: no      Advanced directives: not completed. The purpose and whole concept of Living Will explained. I had encouraged patient to discuss the issue with family and document personal wishes and preferences in a form of Living Will..    Co-managers: ophthalmology , gastroenterologist                                           The following portions of the patient's history were reviewed and updated as appropriate: allergies, current medications, past family history, past medical history, past social history, past surgical history and problem list.    Review of Systems   Constitutional: Negative for chills and fever.   HENT: Negative for postnasal drip, sinus pressure and sore throat.    Eyes: Negative for pain and itching.   Respiratory: Negative for cough and chest tightness.    Cardiovascular: Negative for chest pain and leg swelling.   Gastrointestinal: Negative for abdominal pain and blood in stool.   Endocrine: Negative for cold intolerance and heat intolerance.   Genitourinary: Negative for difficulty urinating and flank pain.   Musculoskeletal: Positive for neck pain. Negative for back pain.   Skin: Negative for color change and rash.   Neurological: Negative for dizziness, weakness and headaches.   Hematological: Negative for adenopathy. Does not bruise/bleed easily.   Psychiatric/Behavioral: Negative  for sleep disturbance. The patient is not nervous/anxious.        Objective   Physical Exam   Constitutional: She is oriented to person, place, and time. Vital signs are normal. She appears well-developed and well-nourished. No distress.   overweight   HENT:   Head: Normocephalic and atraumatic.   Right Ear: External ear normal.   Left Ear: External ear normal.   Nose: Nose normal. No mucosal edema. Right sinus exhibits no maxillary sinus tenderness and no frontal sinus tenderness. Left sinus exhibits no maxillary sinus tenderness and no frontal sinus tenderness.   Mouth/Throat: Oropharynx is clear and moist. No oropharyngeal exudate.   Eyes: Conjunctivae, EOM and lids are normal. Pupils are equal, round, and reactive to light. Right eye exhibits no discharge. Left eye exhibits no discharge. Right conjunctiva is not injected. Left conjunctiva is not injected. No scleral icterus. Right eye exhibits normal extraocular motion. Left eye exhibits normal extraocular motion.   Neck: Normal range of motion and full passive range of motion without pain. Neck supple. No JVD present. Carotid bruit is not present. No thyromegaly present.   Cardiovascular: Normal rate, regular rhythm, S1 normal, S2 normal, normal heart sounds and intact distal pulses. PMI is not displaced. Exam reveals no gallop and no friction rub.   No murmur heard.  Pulmonary/Chest: Effort normal and breath sounds normal. No accessory muscle usage. No respiratory distress. She has no decreased breath sounds. She has no wheezes. She has no rhonchi. She has no rales.   Abdominal: Soft. Bowel sounds are normal. She exhibits no distension, no pulsatile liver, no fluid wave, no abdominal bruit, no ascites and no mass. There is no tenderness. There is no rebound and no guarding.   obese   Musculoskeletal: She exhibits no edema or deformity.   Lymphadenopathy:        Head (right side): No submental, no submandibular, no preauricular, no posterior auricular and no  occipital adenopathy present.        Head (left side): No submental, no submandibular, no tonsillar, no preauricular, no posterior auricular and no occipital adenopathy present.     She has no cervical adenopathy.        Right cervical: No superficial cervical, no deep cervical and no posterior cervical adenopathy present.       Left cervical: No superficial cervical, no deep cervical and no posterior cervical adenopathy present.        Right: No supraclavicular adenopathy present.        Left: No supraclavicular adenopathy present.   Neurological: She is alert and oriented to person, place, and time. She has normal strength and normal reflexes. She displays normal reflexes. No cranial nerve deficit. She exhibits normal muscle tone. Coordination normal.   Reflex Scores:       Bicep reflexes are 2+ on the right side and 2+ on the left side.       Patellar reflexes are 2+ on the right side and 2+ on the left side.  Skin: Skin is warm and dry. No rash noted. She is not diaphoretic. No erythema.   Cherry hemangiomas and benign papillomas, skin tags, there is a patch of velvety dark discoloration underneath the breasts   Psychiatric: She has a normal mood and affect. Her speech is normal and behavior is normal. Thought content normal.   Vitals reviewed.  Reason for ECG:  screening  Rhythm: sinus  Arterial rate:65  MT interval: nl  P waves:nl  QRS:nl  ST: non-specific ST changes   Comparison: unavailable   Impression: normal ECG    Assessment/Plan   Oneida was seen today for welcome to medicare.    Diagnoses and all orders for this visit:    Health care maintenance    Chronic neck pain  -     Ambulatory Referral to Physical Therapy    Recurrent UTI  -     Urinalysis With Microscopic If Indicated (No Culture) - Urine, Clean Catch  -     Urinalysis, Microscopic Only - Urine, Clean Catch; Future  -     Urinalysis, Microscopic Only - Urine, Clean Catch    Visit for screening mammogram  -     Mammo Screening Bilateral With CAD;  Future    Post-menopause  -     DEXA Bone Density Axial; Future    Vegetarian diet  -     Vitamin B12; Future        Annual wellness visit with preventive exam as well as age and risk appropriate councelling completed.    Cardiovascular disease councelling: current. Recommended: will check fasting blood sugar anbd cholestetol.  Diabetes screening and councelling: current. Recommended: Weight loss recommended.  I am concerned about acantosis nigricans-evidence of increased insulin resistance.  Patient was advised to lose weight.  Breast cancer screening: is due. Will schedule..  Osteoporosis screening: is due, will schedule. Benefits explained..  Glaucoma screening: up to date.   AAA screening: not needed..  Hep C screening (born between [birth year]) completed..  Colorectal cancer screening: up to date. Recommended every 5 years. Next screening is recommended in 2021..    Immunizations:   1. Influenza vaccine  is up to date and recommended yearly.   2. Pneumococcal vaccines: discussed and recommended, but patient declines.   3. Shingles prevention: discussed and recommended to get Shindrix at the pharmacy.      Advanced directives planning: not completed. The purpose and whole concept of Living Will explained. I had encouraged patient to discuss the issue with family and document personal wishes and preferences in a form of Living Will..    Medications reviewed and medication list updated.   Potential harmful drug-disease interactions in the elderly:none.  High risk medication in elderly: none  ASA use: no indications.

## 2019-08-28 LAB
BACTERIA UR CULT: NORMAL
Lab: NO GROWTH

## 2019-09-23 ENCOUNTER — TELEPHONE (OUTPATIENT)
Dept: INTERNAL MEDICINE | Facility: CLINIC | Age: 64
End: 2019-09-23

## 2019-09-23 DIAGNOSIS — H57.9 BILATERAL EYE COMPLAINT: Primary | ICD-10-CM

## 2019-09-23 NOTE — TELEPHONE ENCOUNTER
----- Message from Gerda Wallace sent at 9/23/2019  9:30 AM EDT -----  Contact: pt  Patient called in to ask about a referral to an eye surgeon. She stated she talked to Dr Martínez about it at her last office visit. The physicians name is Dr Kaufman with [facility].     Please advise    Pt # [phone]

## 2020-03-11 ENCOUNTER — TELEPHONE (OUTPATIENT)
Dept: INTERNAL MEDICINE | Facility: CLINIC | Age: 65
End: 2020-03-11

## 2020-03-11 NOTE — TELEPHONE ENCOUNTER
Patient is wanting to know the name of the new shingles vaccine and where Dr. Martínez recommends her getting it done.      Patient call back [phone]

## 2020-05-12 DIAGNOSIS — E03.9 PRIMARY HYPOTHYROIDISM: ICD-10-CM

## 2020-05-12 RX ORDER — LEVOTHYROXINE SODIUM 125 MCG
TABLET ORAL
Qty: 90 TABLET | Refills: 3 | Status: SHIPPED | OUTPATIENT
Start: 2020-05-12 | End: 2021-07-21 | Stop reason: SDUPTHER

## 2020-11-11 ENCOUNTER — TRANSCRIBE ORDERS (OUTPATIENT)
Dept: ADMINISTRATIVE | Facility: HOSPITAL | Age: 65
End: 2020-11-11

## 2020-11-11 DIAGNOSIS — T84.050A PERIPROSTHETIC OSTEOLYSIS OF INTERNAL PROSTHETIC RIGHT HIP JOINT, INITIAL ENCOUNTER (HCC): Primary | ICD-10-CM

## 2020-11-18 ENCOUNTER — TRANSCRIBE ORDERS (OUTPATIENT)
Dept: LAB | Facility: HOSPITAL | Age: 65
End: 2020-11-18

## 2020-11-18 ENCOUNTER — HOSPITAL ENCOUNTER (OUTPATIENT)
Dept: ULTRASOUND IMAGING | Facility: HOSPITAL | Age: 65
Discharge: HOME | End: 2020-11-18

## 2020-11-18 ENCOUNTER — LAB (OUTPATIENT)
Dept: LAB | Facility: HOSPITAL | Age: 65
End: 2020-11-18

## 2020-11-18 DIAGNOSIS — H44.399: Primary | ICD-10-CM

## 2020-11-18 DIAGNOSIS — T84.050A PERIPROSTHETIC OSTEOLYSIS OF INTERNAL PROSTHETIC RIGHT HIP JOINT, INITIAL ENCOUNTER (HCC): ICD-10-CM

## 2020-11-18 DIAGNOSIS — H44.399: ICD-10-CM

## 2020-11-18 PROCEDURE — 76882 US LMTD JT/FCL EVL NVASC XTR: CPT

## 2020-11-18 PROCEDURE — 83018 HEAVY METAL QUAN EACH NES: CPT

## 2020-11-18 PROCEDURE — 36415 COLL VENOUS BLD VENIPUNCTURE: CPT

## 2020-11-18 PROCEDURE — 82495 ASSAY OF CHROMIUM: CPT

## 2020-11-19 LAB — CR SERPL-MCNC: 6.8 UG/L (ref 0.1–2.1)

## 2020-11-20 LAB — COBALT SERPL-MCNC: 8.1 UG/L (ref 0–0.9)

## 2020-12-21 ENCOUNTER — OFFICE VISIT (OUTPATIENT)
Dept: FAMILY MEDICINE CLINIC | Facility: CLINIC | Age: 65
End: 2020-12-21

## 2020-12-21 VITALS
SYSTOLIC BLOOD PRESSURE: 121 MMHG | TEMPERATURE: 98.6 F | OXYGEN SATURATION: 98 % | HEIGHT: 67 IN | BODY MASS INDEX: 32.8 KG/M2 | HEART RATE: 76 BPM | WEIGHT: 209 LBS | DIASTOLIC BLOOD PRESSURE: 75 MMHG

## 2020-12-21 DIAGNOSIS — Z78.0 POSTMENOPAUSE: ICD-10-CM

## 2020-12-21 DIAGNOSIS — E78.5 DYSLIPIDEMIA: ICD-10-CM

## 2020-12-21 DIAGNOSIS — Z00.00 MEDICARE ANNUAL WELLNESS VISIT, INITIAL: Primary | ICD-10-CM

## 2020-12-21 DIAGNOSIS — R53.82 CHRONIC FATIGUE: ICD-10-CM

## 2020-12-21 DIAGNOSIS — Z12.31 VISIT FOR SCREENING MAMMOGRAM: ICD-10-CM

## 2020-12-21 DIAGNOSIS — E03.9 ADULT HYPOTHYROIDISM: ICD-10-CM

## 2020-12-21 DIAGNOSIS — E55.9 AVITAMINOSIS D: ICD-10-CM

## 2020-12-21 PROCEDURE — 1170F FXNL STATUS ASSESSED: CPT | Performed by: FAMILY MEDICINE

## 2020-12-21 PROCEDURE — 1159F MED LIST DOCD IN RCRD: CPT | Performed by: FAMILY MEDICINE

## 2020-12-21 PROCEDURE — 1126F AMNT PAIN NOTED NONE PRSNT: CPT | Performed by: FAMILY MEDICINE

## 2020-12-21 PROCEDURE — G0438 PPPS, INITIAL VISIT: HCPCS | Performed by: FAMILY MEDICINE

## 2020-12-21 PROCEDURE — 96160 PT-FOCUSED HLTH RISK ASSMT: CPT | Performed by: FAMILY MEDICINE

## 2020-12-21 NOTE — PROGRESS NOTES
The ABCs of the Annual Wellness Visit  Initial Medicare Wellness Visit    Chief Complaint   Patient presents with   • Hypothyroidism       Subjective   History of Present Illness:  Oneida See is a 65 y.o. female who presents for an Initial Medicare Wellness Visit.    HEALTH RISK ASSESSMENT    Recent Hospitalizations:  No hospitalization(s) within the last year.    Current Medical Providers:  Patient Care Team:  Rosalee Sandy MD as PCP - General (Family Medicine)    Smoking Status:  Social History     Tobacco Use   Smoking Status Former Smoker   • Packs/day: 0.15   • Years: 10.00   • Pack years: 1.50   • Types: Cigarettes   • Quit date:    • Years since quittin.9   Smokeless Tobacco Never Used   Tobacco Comment    social        Alcohol Consumption:  Social History     Substance and Sexual Activity   Alcohol Use Yes    Comment: occasionally       Depression Screen:   PHQ-2/PHQ-9 Depression Screening 2020   Little interest or pleasure in doing things 0   Feeling down, depressed, or hopeless 0   Trouble falling or staying asleep, or sleeping too much 0   Feeling tired or having little energy 0   Poor appetite or overeating 0   Feeling bad about yourself - or that you are a failure or have let yourself or your family down 0   Trouble concentrating on things, such as reading the newspaper or watching television 0   Moving or speaking so slowly that other people could have noticed. Or the opposite - being so fidgety or restless that you have been moving around a lot more than usual 0   Thoughts that you would be better off dead, or of hurting yourself in some way 0   Total Score 0   If you checked off any problems, how difficult have these problems made it for you to do your work, take care of things at home, or get along with other people? Not difficult at all       Fall Risk Screen:  STEADI Fall Risk Assessment was completed, and patient is at LOW risk for falls.Assessment completed  on:12/21/2020    Health Habits and Functional and Cognitive Screening:  Functional & Cognitive Status 12/21/2020   Do you have difficulty preparing food and eating? No   Do you have difficulty bathing yourself, getting dressed or grooming yourself? No   Do you have difficulty using the toilet? No   Do you have difficulty moving around from place to place? No   Do you have trouble with steps or getting out of a bed or a chair? No   Current Diet Well Balanced Diet   Dental Exam Up to date   Eye Exam Up to date   Exercise (times per week) 6 times per week   Current Exercises Include Walking   Do you need help using the phone?  No   Are you deaf or do you have serious difficulty hearing?  No   Do you need help with transportation? No   Do you need help shopping? No   Do you need help preparing meals?  No   Do you need help with housework?  No   Do you need help with laundry? No   Do you need help taking your medications? No   Do you need help managing money? No   Do you ever drive or ride in a car without wearing a seat belt? No   Have you felt unusual stress, anger or loneliness in the last month? No   Who do you live with? Spouse   If you need help, do you have trouble finding someone available to you? No   Have you been bothered in the last four weeks by sexual problems? No   Do you have difficulty concentrating, remembering or making decisions? No         Does the patient have evidence of cognitive impairment? No    Asprin use counseling:Does not need ASA (and currently is not on it)    Age-appropriate Screening Schedule:  Refer to the list below for future screening recommendations based on patient's age, sex and/or medical conditions. Orders for these recommended tests are listed in the plan section. The patient has been provided with a written plan.    Health Maintenance   Topic Date Due   • DXA SCAN  12/12/2018   • MAMMOGRAM  04/24/2019   • ZOSTER VACCINE (1 of 2) 12/21/2020 (Originally 2/9/2004)   • COLONOSCOPY   12/19/2021   • TDAP/TD VACCINES (2 - Td) 09/10/2023   • INFLUENZA VACCINE  Discontinued   • PAP SMEAR  Discontinued          The following portions of the patient's history were reviewed and updated as appropriate: allergies, current medications, past family history, past medical history, past social history, past surgical history and problem list.    Outpatient Medications Prior to Visit   Medication Sig Dispense Refill   • B Complex Vitamins (VITAMIN B COMPLEX) tablet Take 1 tablet by mouth Daily. 90 each 0   • Cholecalciferol (VITAMIN D3) 5000 UNITS capsule capsule Take 1 capsule by mouth Daily. 90 capsule 3   • ibuprofen (ADVIL) 200 MG tablet Take 200 mg by mouth As Needed for Mild Pain .     • SYNTHROID 125 MCG tablet TAKE 1 TABLET EVERY DAY 90 tablet 3   • Vitamin Mixture (VITAMIN E COMPLETE PO) Take  by mouth.     • Omega-3 Fatty Acids (FISH OIL) 1000 MG capsule capsule Take 1 capsule by mouth 2 (Two) Times a Day With Meals. 90 capsule 0   • meloxicam (MOBIC) 15 MG tablet Take 1 tablet by mouth Daily. 30 tablet 0     No facility-administered medications prior to visit.        Patient Active Problem List   Diagnosis   • Adult hypothyroidism   • LBP (low back pain)   • Chronic neck pain   • Obstructive apnea   • Avitaminosis D   • Health care maintenance   • Colon polyp   • Gastroesophageal reflux disease without esophagitis   • Thoracic radiculopathy   • Slow transit constipation   • Microscopic hematuria   • Chest wall pain       Advanced Care Planning:  ACP discussion was held with the patient during this visit. Patient does not have an advance directive, information provided.    Review of Systems   Constitutional: Positive for fatigue.       Compared to one year ago, the patient feels her physical health is the same.  Compared to one year ago, the patient feels her mental health is the same.    Reviewed chart for potential of high risk medication in the elderly: yes  Reviewed chart for potential of harmful  "drug interactions in the elderly:yes    Objective         Vitals:    12/21/20 0823   BP: 121/75   BP Location: Left arm   Patient Position: Sitting   Pulse: 76   Temp: 98.6 °F (37 °C)   SpO2: 98%   Weight: 94.8 kg (209 lb)   Height: 168.9 cm (66.5\")       Body mass index is 33.23 kg/m².  Discussed the patient's BMI with her. The BMI is above average; BMI management plan is completed.    Physical Exam  Vitals signs and nursing note reviewed.   Constitutional:       General: She is not in acute distress.     Appearance: She is well-developed. She is not diaphoretic.   Cardiovascular:      Rate and Rhythm: Normal rate and regular rhythm.   Pulmonary:      Effort: Pulmonary effort is normal. No respiratory distress.      Breath sounds: Normal breath sounds. No wheezing.               Assessment/Plan   Medicare Risks and Personalized Health Plan  CMS Preventative Services Quick Reference  Fall Risk    The above risks/problems have been discussed with the patient.  Pertinent information has been shared with the patient in the After Visit Summary.  Follow up plans and orders are seen below in the Assessment/Plan Section.    Diagnoses and all orders for this visit:    1. Medicare annual wellness visit, initial (Primary)    2. Postmenopause  -     DEXA Bone Density Axial; Future    3. Visit for screening mammogram  -     Mammo Screening Digital Tomosynthesis Bilateral With CAD; Future    4. Avitaminosis D  -     Vitamin D 25 Hydroxy    5. Adult hypothyroidism  -     TSH    6. Chronic fatigue  -     Vitamin B12 & Folate  -     CBC & Differential    7. Dyslipidemia  -     Comprehensive Metabolic Panel  -     Lipid Panel      Follow Up:  Return in about 1 year (around 12/21/2021) for Medicare Wellness.       An After Visit Summary and PPPS were given to the patient.           "

## 2020-12-22 LAB
25(OH)D3+25(OH)D2 SERPL-MCNC: 22.1 NG/ML (ref 30–100)
ALBUMIN SERPL-MCNC: 4 G/DL (ref 3.5–5.2)
ALBUMIN/GLOB SERPL: 1.4 G/DL
ALP SERPL-CCNC: 82 U/L (ref 39–117)
ALT SERPL-CCNC: 14 U/L (ref 1–33)
AST SERPL-CCNC: 13 U/L (ref 1–32)
BASOPHILS # BLD AUTO: 0.03 10*3/MM3 (ref 0–0.2)
BASOPHILS NFR BLD AUTO: 0.7 % (ref 0–1.5)
BILIRUB SERPL-MCNC: 0.5 MG/DL (ref 0–1.2)
BUN SERPL-MCNC: 15 MG/DL (ref 8–23)
BUN/CREAT SERPL: 18.5 (ref 7–25)
CALCIUM SERPL-MCNC: 9.1 MG/DL (ref 8.6–10.5)
CHLORIDE SERPL-SCNC: 104 MMOL/L (ref 98–107)
CHOLEST SERPL-MCNC: 202 MG/DL (ref 0–200)
CO2 SERPL-SCNC: 25.8 MMOL/L (ref 22–29)
CREAT SERPL-MCNC: 0.81 MG/DL (ref 0.57–1)
EOSINOPHIL # BLD AUTO: 0.08 10*3/MM3 (ref 0–0.4)
EOSINOPHIL NFR BLD AUTO: 1.8 % (ref 0.3–6.2)
ERYTHROCYTE [DISTWIDTH] IN BLOOD BY AUTOMATED COUNT: 12.7 % (ref 12.3–15.4)
FOLATE SERPL-MCNC: 13.2 NG/ML (ref 4.78–24.2)
GLOBULIN SER CALC-MCNC: 2.8 GM/DL
GLUCOSE SERPL-MCNC: 90 MG/DL (ref 65–99)
HCT VFR BLD AUTO: 41.9 % (ref 34–46.6)
HDLC SERPL-MCNC: 68 MG/DL (ref 40–60)
HGB BLD-MCNC: 14 G/DL (ref 12–15.9)
IMM GRANULOCYTES # BLD AUTO: 0.01 10*3/MM3 (ref 0–0.05)
IMM GRANULOCYTES NFR BLD AUTO: 0.2 % (ref 0–0.5)
LDLC SERPL CALC-MCNC: 120 MG/DL (ref 0–100)
LYMPHOCYTES # BLD AUTO: 1.45 10*3/MM3 (ref 0.7–3.1)
LYMPHOCYTES NFR BLD AUTO: 33.2 % (ref 19.6–45.3)
MCH RBC QN AUTO: 31.4 PG (ref 26.6–33)
MCHC RBC AUTO-ENTMCNC: 33.4 G/DL (ref 31.5–35.7)
MCV RBC AUTO: 93.9 FL (ref 79–97)
MONOCYTES # BLD AUTO: 0.42 10*3/MM3 (ref 0.1–0.9)
MONOCYTES NFR BLD AUTO: 9.6 % (ref 5–12)
NEUTROPHILS # BLD AUTO: 2.38 10*3/MM3 (ref 1.7–7)
NEUTROPHILS NFR BLD AUTO: 54.5 % (ref 42.7–76)
NRBC BLD AUTO-RTO: 0 /100 WBC (ref 0–0.2)
PLATELET # BLD AUTO: 276 10*3/MM3 (ref 140–450)
POTASSIUM SERPL-SCNC: 4.3 MMOL/L (ref 3.5–5.2)
PROT SERPL-MCNC: 6.8 G/DL (ref 6–8.5)
RBC # BLD AUTO: 4.46 10*6/MM3 (ref 3.77–5.28)
SODIUM SERPL-SCNC: 138 MMOL/L (ref 136–145)
TRIGL SERPL-MCNC: 77 MG/DL (ref 0–150)
TSH SERPL DL<=0.005 MIU/L-ACNC: 1.33 UIU/ML (ref 0.27–4.2)
VIT B12 SERPL-MCNC: 293 PG/ML (ref 211–946)
VLDLC SERPL CALC-MCNC: 14 MG/DL (ref 5–40)
WBC # BLD AUTO: 4.37 10*3/MM3 (ref 3.4–10.8)

## 2020-12-22 RX ORDER — CHOLECALCIFEROL (VITAMIN D3) 1250 MCG
50000 CAPSULE ORAL
Qty: 12 CAPSULE | Refills: 3 | Status: SHIPPED | OUTPATIENT
Start: 2020-12-22 | End: 2021-11-11 | Stop reason: SDUPTHER

## 2021-01-29 ENCOUNTER — TRANSCRIBE ORDERS (OUTPATIENT)
Dept: PREADMISSION TESTING | Facility: HOSPITAL | Age: 66
End: 2021-01-29

## 2021-01-29 DIAGNOSIS — Z01.818 OTHER SPECIFIED PRE-OPERATIVE EXAMINATION: Primary | ICD-10-CM

## 2021-02-04 ENCOUNTER — APPOINTMENT (OUTPATIENT)
Dept: PREADMISSION TESTING | Facility: HOSPITAL | Age: 66
End: 2021-02-04

## 2021-02-04 ENCOUNTER — HOSPITAL ENCOUNTER (OUTPATIENT)
Dept: GENERAL RADIOLOGY | Facility: HOSPITAL | Age: 66
Discharge: HOME | End: 2021-02-04

## 2021-02-04 VITALS
TEMPERATURE: 97.9 F | RESPIRATION RATE: 16 BRPM | BODY MASS INDEX: 33.43 KG/M2 | WEIGHT: 208 LBS | HEIGHT: 66 IN | HEART RATE: 79 BPM | SYSTOLIC BLOOD PRESSURE: 115 MMHG | DIASTOLIC BLOOD PRESSURE: 78 MMHG | OXYGEN SATURATION: 100 %

## 2021-02-04 LAB
ABO GROUP BLD: NORMAL
ALBUMIN SERPL-MCNC: 4.1 G/DL (ref 3.5–5.2)
ALBUMIN/GLOB SERPL: 1.5 G/DL
ALP SERPL-CCNC: 77 U/L (ref 39–117)
ALT SERPL W P-5'-P-CCNC: 11 U/L (ref 1–33)
ANION GAP SERPL CALCULATED.3IONS-SCNC: 11 MMOL/L (ref 5–15)
APTT PPP: 26.3 SECONDS (ref 22.7–35.4)
AST SERPL-CCNC: 13 U/L (ref 1–32)
BACTERIA UR QL AUTO: ABNORMAL /HPF
BASOPHILS # BLD AUTO: 0.03 10*3/MM3 (ref 0–0.2)
BASOPHILS NFR BLD AUTO: 0.6 % (ref 0–1.5)
BILIRUB SERPL-MCNC: 0.5 MG/DL (ref 0–1.2)
BILIRUB UR QL STRIP: NEGATIVE
BLD GP AB SCN SERPL QL: NEGATIVE
BUN SERPL-MCNC: 19 MG/DL (ref 8–23)
BUN/CREAT SERPL: 27.1 (ref 7–25)
CALCIUM SPEC-SCNC: 9.3 MG/DL (ref 8.6–10.5)
CHLORIDE SERPL-SCNC: 106 MMOL/L (ref 98–107)
CLARITY UR: CLEAR
CO2 SERPL-SCNC: 23 MMOL/L (ref 22–29)
COLOR UR: YELLOW
CREAT SERPL-MCNC: 0.7 MG/DL (ref 0.57–1)
DEPRECATED RDW RBC AUTO: 41.4 FL (ref 37–54)
EOSINOPHIL # BLD AUTO: 0.09 10*3/MM3 (ref 0–0.4)
EOSINOPHIL NFR BLD AUTO: 1.9 % (ref 0.3–6.2)
ERYTHROCYTE [DISTWIDTH] IN BLOOD BY AUTOMATED COUNT: 12.6 % (ref 12.3–15.4)
GFR SERPL CREATININE-BSD FRML MDRD: 84 ML/MIN/1.73
GLOBULIN UR ELPH-MCNC: 2.7 GM/DL
GLUCOSE SERPL-MCNC: 83 MG/DL (ref 65–99)
GLUCOSE UR STRIP-MCNC: NEGATIVE MG/DL
HCT VFR BLD AUTO: 41.6 % (ref 34–46.6)
HGB BLD-MCNC: 13.7 G/DL (ref 12–15.9)
HGB UR QL STRIP.AUTO: ABNORMAL
HYALINE CASTS UR QL AUTO: ABNORMAL /LPF
IMM GRANULOCYTES # BLD AUTO: 0.01 10*3/MM3 (ref 0–0.05)
IMM GRANULOCYTES NFR BLD AUTO: 0.2 % (ref 0–0.5)
INR PPP: 0.93 (ref 0.9–1.1)
KETONES UR QL STRIP: NEGATIVE
LEUKOCYTE ESTERASE UR QL STRIP.AUTO: ABNORMAL
LYMPHOCYTES # BLD AUTO: 1.36 10*3/MM3 (ref 0.7–3.1)
LYMPHOCYTES NFR BLD AUTO: 29.3 % (ref 19.6–45.3)
MCH RBC QN AUTO: 30 PG (ref 26.6–33)
MCHC RBC AUTO-ENTMCNC: 32.9 G/DL (ref 31.5–35.7)
MCV RBC AUTO: 91 FL (ref 79–97)
MONOCYTES # BLD AUTO: 0.46 10*3/MM3 (ref 0.1–0.9)
MONOCYTES NFR BLD AUTO: 9.9 % (ref 5–12)
NEUTROPHILS NFR BLD AUTO: 2.69 10*3/MM3 (ref 1.7–7)
NEUTROPHILS NFR BLD AUTO: 58.1 % (ref 42.7–76)
NITRITE UR QL STRIP: NEGATIVE
NRBC BLD AUTO-RTO: 0 /100 WBC (ref 0–0.2)
PH UR STRIP.AUTO: <=5 [PH] (ref 5–8)
PLATELET # BLD AUTO: 269 10*3/MM3 (ref 140–450)
PMV BLD AUTO: 9.9 FL (ref 6–12)
POTASSIUM SERPL-SCNC: 4.3 MMOL/L (ref 3.5–5.2)
PROT SERPL-MCNC: 6.8 G/DL (ref 6–8.5)
PROT UR QL STRIP: NEGATIVE
PROTHROMBIN TIME: 12.2 SECONDS (ref 11.7–14.2)
QT INTERVAL: 365 MS
RBC # BLD AUTO: 4.57 10*6/MM3 (ref 3.77–5.28)
RBC # UR: ABNORMAL /HPF
REF LAB TEST METHOD: ABNORMAL
RH BLD: POSITIVE
SODIUM SERPL-SCNC: 140 MMOL/L (ref 136–145)
SP GR UR STRIP: 1.02 (ref 1–1.03)
SQUAMOUS #/AREA URNS HPF: ABNORMAL /HPF
T&S EXPIRATION DATE: NORMAL
UROBILINOGEN UR QL STRIP: ABNORMAL
WBC # BLD AUTO: 4.64 10*3/MM3 (ref 3.4–10.8)
WBC UR QL AUTO: ABNORMAL /HPF

## 2021-02-04 PROCEDURE — 85025 COMPLETE CBC W/AUTO DIFF WBC: CPT

## 2021-02-04 PROCEDURE — 81001 URINALYSIS AUTO W/SCOPE: CPT

## 2021-02-04 PROCEDURE — 86900 BLOOD TYPING SEROLOGIC ABO: CPT

## 2021-02-04 PROCEDURE — 85730 THROMBOPLASTIN TIME PARTIAL: CPT

## 2021-02-04 PROCEDURE — 80053 COMPREHEN METABOLIC PANEL: CPT

## 2021-02-04 PROCEDURE — 71046 X-RAY EXAM CHEST 2 VIEWS: CPT

## 2021-02-04 PROCEDURE — 36415 COLL VENOUS BLD VENIPUNCTURE: CPT

## 2021-02-04 PROCEDURE — 86850 RBC ANTIBODY SCREEN: CPT

## 2021-02-04 PROCEDURE — 86901 BLOOD TYPING SEROLOGIC RH(D): CPT

## 2021-02-04 PROCEDURE — 87186 SC STD MICRODIL/AGAR DIL: CPT

## 2021-02-04 PROCEDURE — 73502 X-RAY EXAM HIP UNI 2-3 VIEWS: CPT

## 2021-02-04 PROCEDURE — 87088 URINE BACTERIA CULTURE: CPT

## 2021-02-04 PROCEDURE — 85610 PROTHROMBIN TIME: CPT

## 2021-02-04 PROCEDURE — 87086 URINE CULTURE/COLONY COUNT: CPT

## 2021-02-04 PROCEDURE — 93005 ELECTROCARDIOGRAM TRACING: CPT

## 2021-02-04 PROCEDURE — 93010 ELECTROCARDIOGRAM REPORT: CPT | Performed by: INTERNAL MEDICINE

## 2021-02-04 ASSESSMENT — HOOS JR
HOOS JR SCORE: 70.426
HOOS JR SCORE: 6

## 2021-02-04 NOTE — DISCHARGE INSTRUCTIONS
Arrive to hospital on your day of surgery at 10AM ON 2-18-21    Take the following medications the morning of surgery:  SYNTHROID    BRING CPAP TO SURGERY WITH YOU PLEASE      If you are on prescription narcotic pain medication to control your pain you may also take that medication the morning of surgery.    General Instructions:  • Do not eat solid food after midnight the night before surgery.  • You may drink clear liquids day of surgery but must stop at least one hour before your hospital arrival time.  • It is beneficial for you to have a clear drink that contains carbohydrates the day of surgery.  We suggest a 12 to 20 ounce bottle of Gatorade or Powerade for non-diabetic patients or a 12 to 20 ounce bottle of G2 or Powerade Zero for diabetic patients. (Pediatric patients, are not advised to drink a 12 to 20 ounce carbohydrate drink)    Clear liquids are liquids you can see through.  Nothing red in color.     Plain water                               Sports drinks  Sodas                                   Gelatin (Jell-O)  Fruit juices without pulp such as white grape juice and apple juice  Popsicles that contain no fruit or yogurt  Tea or coffee (no cream or milk added)  Gatorade / Powerade  G2 / Powerade Zero    • Infants may have breast milk up to four hours before surgery.  • Infants drinking formula may drink formula up to six hours before surgery.   • Patients who avoid smoking, chewing tobacco and alcohol for 4 weeks prior to surgery have a reduced risk of post-operative complications.  Quit smoking as many days before surgery as you can.  • Do not smoke, use chewing tobacco or drink alcohol the day of surgery.   • If applicable bring your C-PAP/ BI-PAP machine.  • Bring any papers given to you in the doctor’s office.  • Wear clean comfortable clothes.  • Do not wear contact lenses, false eyelashes or make-up.  Bring a case for your glasses.   • Bring crutches or walker if applicable.  • Remove all  piercings.  Leave jewelry and any other valuables at home.  • Hair extensions with metal clips must be removed prior to surgery.  • The Pre-Admission Testing nurse will instruct you to bring medications if unable to obtain an accurate list in Pre-Admission Testing.        If you were given a blood bank ID arm band remember to bring it with you the day of surgery.    Preventing a Surgical Site Infection:  • For 2 to 3 days before surgery, avoid shaving with a razor because the razor can irritate skin and make it easier to develop an infection.    • Any areas of open skin can increase the risk of a post-operative wound infection by allowing bacteria to enter and travel throughout the body.  Notify your surgeon if you have any skin wounds / rashes even if it is not near the expected surgical site.  The area will need assessed to determine if surgery should be delayed until it is healed.  • The night prior to surgery shower using a fresh bar of anti-bacterial soap (such as Dial) and clean washcloth.  Sleep in a clean bed with clean clothing.  Do not allow pets to sleep with you.  • Shower on the morning of surgery using a fresh bar of anti-bacterial soap (such as Dial) and clean washcloth.  Dry with a clean towel and dress in clean clothing.  • Ask your surgeon if you will be receiving antibiotics prior to surgery.  • Make sure you, your family, and all healthcare providers clean their hands with soap and water or an alcohol based hand  before caring for you or your wound.    Day of surgery:  Your arrival time is approximately two hours before your scheduled surgery time.  Upon arrival, a Pre-op nurse and Anesthesiologist will review your health history, obtain vital signs, and answer questions you may have.  The only belongings needed at this time will be a list of your home medications and if applicable your C-PAP/BI-PAP machine.  A Pre-op nurse will start an IV and you may receive medication in preparation  for surgery, including something to help you relax.     Please be aware that surgery does come with discomfort.  We want to make every effort to control your discomfort so please discuss any uncontrolled symptoms with your nurse.   Your doctor will most likely have prescribed pain medications.      If you are going home after surgery you will receive individualized written care instructions before being discharged.  A responsible adult must drive you to and from the hospital on the day of your surgery and stay with you for 24 hours.  Discharge prescriptions can be filled by the hospital pharmacy during regular pharmacy hours.  If you are having surgery late in the day/evening your prescription may be e-prescribed to your pharmacy.  Please verify your pharmacy hours or chose a 24 hour pharmacy to avoid not having access to your prescription because your pharmacy has closed for the day.    If you are staying overnight following surgery, you will be transported to your hospital room following the recovery period.  [facility] has all private rooms.    If you have any questions please call Pre-Admission Testing at [phone].  Deductibles and co-payments are collected on the day of service. Please be prepared to pay the required co-pay, deductible or deposit on the day of service as defined by your plan.    Patient Education for Self-Quarantine Process    Following your COVID testing, we strongly recommend that you do not leave your home after you have been tested for COVID except to get medical care. This includes not going to work, school or to public areas.  If this is not possible for you to do please limit your activities to only required outings.  Be sure to wear a mask when you are with other people, practice social distancing and wash your hands frequently.      The following items provide additional details to keep you safe.  • Wash your hands with soap and water frequently for at least 20  seconds.   • Avoid touching your eyes, nose and mouth with unwashed hands.  • Do not share anything - utensils, towels, food from the same bowl.   • Have your own utensils, drinking glass, dishes, towels and bedding.   • Do not have visitors.   • Do use FaceTime to stay in touch with family and friends.  • You should stay in a specific room away from others if possible.   • Stay at least 6 feet away from others in the home if you cannot have a dedicated room to yourself.   • Do not snuggle with your pet. While the CDC says there is no evidence that pets can spread COVID-19 or be infected from humans, it is probably best to avoid “petting, snuggling, being kissed or licked and sharing food (during self-quarantine)”, according to the CDC.   • Sanitize household surfaces daily. Include all high touch areas (door handles, light switches, phones, countertops, etc.)  • Do not share a bathroom with others, if possible.   • Wear a mask around others in your home if you are unable to stay in a separate room or 6 feet apart. If  you are unable to wear a mask, have your family member wear a mask if they must be within 6 feet of you.   Call your surgeon immediately if you experience any of the following symptoms:  • Sore Throat  • Shortness of Breath or difficulty breathing  • Cough  • Chills  • Body soreness or muscle pain  • Headache  • Fever  • New loss of taste or smell  • Do not arrive for your surgery ill.  Your procedure will need to be rescheduled to another time.  You will need to call your physician before the day of surgery to avoid any unnecessary exposure to hospital staff as well as other patients.    CHLORHEXIDINE CLOTH INSTRUCTIONS  The morning of surgery follow these instructions using the Chlorhexidine cloths you've been given.  These steps reduce bacteria on the body.  Do not use the cloths near your eyes, ears mouth, genitalia or on open wounds.  Throw the cloths away after use but do not try to flush them  down a toilet.      • Open and remove one cloth at a time from the package.    • Leave the cloth unfolded and begin the bathing.  • Massage the skin with the cloths using gentle pressure to remove bacteria.  Do not scrub harshly.   • Follow the steps below with one 2% CHG cloth per area (6 total cloths).  • One cloth for neck, shoulders and chest.  • One cloth for both arms, hands, fingers and underarms (do underarms last).  • One cloth for the abdomen followed by groin.  • One cloth for right leg and foot including between the toes.  • One cloth for left leg and foot including between the toes.  • The last cloth is to be used for the back of the neck, back and buttocks.    Allow the CHG to air dry 3 minutes on the skin which will give it time to work and decrease the chance of irritation.  The skin may feel sticky until it is dry.  Do not rinse with water or any other liquid or you will lose the beneficial effects of the CHG.  If mild skin irritation occurs, do rinse the skin to remove the CHG.  Report this to the nurse at time of admission.  Do not apply lotions, creams, ointments, deodorants or perfumes after using the clothes. Dress in clean clothes before coming to the hospital.    BACTROBAN NASAL OINTMENT  There are many germs normally in your nose. Bactroban is an ointment that will help reduce these germs. Please follow these instructions for Bactroban use:      ____The day before surgery in the morning  Date________    ____The day before surgery in the evening              Date________    ____The day of surgery in the morning    Date________    **Squirt ½ package of Bactroban Ointment onto a cotton applicator and apply to inside of 1st nostril.  Squirt the remaining Bactroban and apply to the inside of the other nostril.

## 2021-02-06 LAB — BACTERIA SPEC AEROBE CULT: ABNORMAL

## 2021-03-16 ENCOUNTER — TELEMEDICINE (OUTPATIENT)
Dept: FAMILY MEDICINE CLINIC | Facility: CLINIC | Age: 66
End: 2021-03-16

## 2021-03-16 ENCOUNTER — TRANSCRIBE ORDERS (OUTPATIENT)
Dept: PREADMISSION TESTING | Facility: HOSPITAL | Age: 66
End: 2021-03-16

## 2021-03-16 DIAGNOSIS — Z01.818 OTHER SPECIFIED PRE-OPERATIVE EXAMINATION: Primary | ICD-10-CM

## 2021-03-16 DIAGNOSIS — N30.01 ACUTE CYSTITIS WITH HEMATURIA: Primary | ICD-10-CM

## 2021-03-16 PROCEDURE — 99213 OFFICE O/P EST LOW 20 MIN: CPT | Performed by: FAMILY MEDICINE

## 2021-03-16 NOTE — PROGRESS NOTES
Subjective   Oneida See is a 66 y.o. female.   Consent given    Time 20 min    Visit [facility]    CC: pain with urination, back pain    History of Present Illness   Started sx back pain and s/p pain several days ago.    The following portions of the patient's history were reviewed and updated as appropriate: allergies, current medications, past family history, past medical history, past social history, past surgical history and problem list.    Past Medical History:   Diagnosis Date   • Arthritis    • Contact dermatitis and eczema due to plant     Poison ivy   • Disease of thyroid gland    • Floaters in visual field, bilateral    • H/O degenerative disc disease    • Hemangioma of liver     CT 2017   • History of concussion    • Human papilloma virus (HPV) infection 2013    Overview:  2013 pap negative, HPV positive, 16/18 negative 10/2014 pap negative and HPV negative   • Lower back pain    • Memory change    • Multiple polyps of sigmoid colon    • Neck pain    • PONV (postoperative nausea and vomiting)    • Sleep apnea        Past Surgical History:   Procedure Laterality Date   •  SECTION      Dr Mishra   • COLONOSCOPY     • COLONOSCOPY W/ POLYPECTOMY  2016    : 1 polyp   • OOPHORECTOMY Bilateral      Dr Meraz  uterus intact   • TOTAL HIP ARTHROPLASTY      Right  Dr Hollins Left  Dr Hollins       Family History   Problem Relation Age of Onset   • Heart disease Father    • Breast cancer Maternal Aunt 70   • Malig Hyperthermia Neg Hx        Social History     Socioeconomic History   • Marital status:      Spouse name: Not on file   • Number of children: Not on file   • Years of education: Not on file   • Highest education level: Not on file   Tobacco Use   • Smoking status: Former Smoker     Packs/day: 0.15     Years: 10.00     Pack years: 1.50     Types: Cigarettes     Quit date:      Years since quittin.2   • Smokeless tobacco: Never Used   •  Tobacco comment: social    Vaping Use   • Vaping Use: Never used   Substance and Sexual Activity   • Alcohol use: Yes     Comment: occasionally   • Drug use: No   • Sexual activity: Defer       Current Outpatient Medications on File Prior to Visit   Medication Sig Dispense Refill   • Ascorbic Acid (VITAMIN C PO) Take 1 tablet by mouth Daily.     • CHLORHEXIDINE GLUCONATE CLOTH EX Apply  topically. USE AS DIRECTED     • Cholecalciferol (Vitamin D3) 1.25 MG (70988 UT) capsule Take 1 capsule by mouth Every 7 (Seven) Days. (Patient taking differently: Take 50,000 Units by mouth Every 7 (Seven) Days. SUNDAYS, HOLD 7 DAYS PRIOR TO SURGERY) 12 capsule 3   • Multiple Vitamins-Minerals (ZINC PO) Take 1 tablet by mouth Daily. HOLD FOR ONE WEEK PRIOR TO SURGERY     • mupirocin (BACTROBAN) 2 % ointment Apply  topically to the appropriate area as directed 3 (Three) Times a Day. USE AS DIRECTED     • SYNTHROID 125 MCG tablet TAKE 1 TABLET EVERY DAY (Patient taking differently: Take 125 mcg by mouth Daily.) 90 tablet 3   • Vitamin Mixture (VITAMIN E COMPLETE PO) Take 1 tablet by mouth Daily.       No current facility-administered medications on file prior to visit.       Review of Systems   Genitourinary: Positive for dysuria.       Recent Results (from the past 4704 hour(s))   Chromium Level    Collection Time: 11/18/20  1:51 PM    Specimen: Blood   Result Value Ref Range    Chromium, Plasma 6.8 (H) 0.1 - 2.1 ug/L   Howard    Collection Time: 11/18/20  1:51 PM    Specimen: Blood   Result Value Ref Range    [place] 8.1 (H) 0.0 - 0.9 ug/L   TSH    Collection Time: 12/21/20  9:01 AM    Specimen: Blood   Result Value Ref Range    TSH 1.330 0.270 - 4.200 uIU/mL   Vitamin D 25 Hydroxy    Collection Time: 12/21/20  9:01 AM    Specimen: Blood   Result Value Ref Range    25 Hydroxy, Vitamin D 22.1 (L) 30.0 - 100.0 ng/ml   Vitamin B12 & Folate    Collection Time: 12/21/20  9:01 AM    Specimen: Blood   Result Value Ref Range    Vitamin B-12  293 211 - 946 pg/mL    Folate 13.20 4.78 - 24.20 ng/mL   Comprehensive Metabolic Panel    Collection Time: 12/21/20  9:01 AM    Specimen: Blood   Result Value Ref Range    Glucose 90 65 - 99 mg/dL    BUN 15 8 - 23 mg/dL    Creatinine 0.81 0.57 - 1.00 mg/dL    eGFR Non African Am 71 >60 mL/min/1.73    eGFR African Am 86 >60 mL/min/1.73    BUN/Creatinine Ratio 18.5 7.0 - 25.0    Sodium 138 136 - 145 mmol/L    Potassium 4.3 3.5 - 5.2 mmol/L    Chloride 104 98 - 107 mmol/L    Total CO2 25.8 22.0 - 29.0 mmol/L    Calcium 9.1 8.6 - 10.5 mg/dL    Total Protein 6.8 6.0 - 8.5 g/dL    Albumin 4.00 3.50 - 5.20 g/dL    Globulin 2.8 gm/dL    A/G Ratio 1.4 g/dL    Total Bilirubin 0.5 0.0 - 1.2 mg/dL    Alkaline Phosphatase 82 39 - 117 U/L    AST (SGOT) 13 1 - 32 U/L    ALT (SGPT) 14 1 - 33 U/L   Lipid Panel    Collection Time: 12/21/20  9:01 AM    Specimen: Blood   Result Value Ref Range    Total Cholesterol 202 (H) 0 - 200 mg/dL    Triglycerides 77 0 - 150 mg/dL    HDL Cholesterol 68 (H) 40 - 60 mg/dL    VLDL Cholesterol Jalen 14 5 - 40 mg/dL    LDL Chol Calc (NIH) 120 (H) 0 - 100 mg/dL   CBC & Differential    Collection Time: 12/21/20  9:01 AM    Specimen: Blood   Result Value Ref Range    WBC 4.37 3.40 - 10.80 10*3/mm3    RBC 4.46 3.77 - 5.28 10*6/mm3    Hemoglobin 14.0 12.0 - 15.9 g/dL    Hematocrit 41.9 34.0 - 46.6 %    MCV 93.9 79.0 - 97.0 fL    MCH 31.4 26.6 - 33.0 pg    MCHC 33.4 31.5 - 35.7 g/dL    RDW 12.7 12.3 - 15.4 %    Platelets 276 140 - 450 10*3/mm3    Neutrophil Rel % 54.5 42.7 - 76.0 %    Lymphocyte Rel % 33.2 19.6 - 45.3 %    Monocyte Rel % 9.6 5.0 - 12.0 %    Eosinophil Rel % 1.8 0.3 - 6.2 %    Basophil Rel % 0.7 0.0 - 1.5 %    Neutrophils Absolute 2.38 1.70 - 7.00 10*3/mm3    Lymphocytes Absolute 1.45 0.70 - 3.10 10*3/mm3    Monocytes Absolute 0.42 0.10 - 0.90 10*3/mm3    Eosinophils Absolute 0.08 0.00 - 0.40 10*3/mm3    Basophils Absolute 0.03 0.00 - 0.20 10*3/mm3    Immature Granulocyte Rel % 0.2 0.0 - 0.5 %     Immature Grans Absolute 0.01 0.00 - 0.05 10*3/mm3    nRBC 0.0 0.0 - 0.2 /100 WBC   aPTT    Collection Time: 02/04/21  8:56 AM    Specimen: Blood   Result Value Ref Range    PTT 26.3 22.7 - 35.4 seconds   Protime-INR    Collection Time: 02/04/21  8:56 AM    Specimen: Blood   Result Value Ref Range    Protime 12.2 11.7 - 14.2 Seconds    INR 0.93 0.90 - 1.10   Comprehensive Metabolic Panel    Collection Time: 02/04/21  8:56 AM    Specimen: Blood   Result Value Ref Range    Glucose 83 65 - 99 mg/dL    BUN 19 8 - 23 mg/dL    Creatinine 0.70 0.57 - 1.00 mg/dL    Sodium 140 136 - 145 mmol/L    Potassium 4.3 3.5 - 5.2 mmol/L    Chloride 106 98 - 107 mmol/L    CO2 23.0 22.0 - 29.0 mmol/L    Calcium 9.3 8.6 - 10.5 mg/dL    Total Protein 6.8 6.0 - 8.5 g/dL    Albumin 4.10 3.50 - 5.20 g/dL    ALT (SGPT) 11 1 - 33 U/L    AST (SGOT) 13 1 - 32 U/L    Alkaline Phosphatase 77 39 - 117 U/L    Total Bilirubin 0.5 0.0 - 1.2 mg/dL    eGFR Non African Amer 84 >60 mL/min/1.73    Globulin 2.7 gm/dL    A/G Ratio 1.5 g/dL    BUN/Creatinine Ratio 27.1 (H) 7.0 - 25.0    Anion Gap 11.0 5.0 - 15.0 mmol/L   Type & Screen    Collection Time: 02/04/21  8:56 AM    Specimen: Blood   Result Value Ref Range    ABO Type A     RH type Positive     Antibody Screen Negative     T&S Expiration Date 2/18/2021 11:59:00 PM    Urine Culture - Urine, Urine, Random Void    Collection Time: 02/04/21  8:57 AM    Specimen: Urine, Random Void   Result Value Ref Range    Urine Culture >100,000 CFU/mL Escherichia coli (A)        Susceptibility    Escherichia coli - TEA     Ampicillin >=32 Resistant ug/ml     Ampicillin + Sulbactam 16 Intermediate ug/ml     Cefazolin <=4 Susceptible ug/ml     Cefepime <=1 Susceptible ug/ml     Ceftazidime <=1 Susceptible ug/ml     Ceftriaxone <=1 Susceptible ug/ml     Gentamicin <=1 Susceptible ug/ml     Levofloxacin <=0.12 Susceptible ug/ml     Nitrofurantoin <=16 Susceptible ug/ml     Piperacillin + Tazobactam <=4 Susceptible ug/ml      Tetracycline >=16 Resistant ug/ml     Trimethoprim + Sulfamethoxazole <=20 Susceptible ug/ml   Urinalysis With Microscopic If Indicated (No Culture) - Urine, Clean Catch    Collection Time: 02/04/21  8:57 AM    Specimen: Urine, Clean Catch   Result Value Ref Range    Color, UA Yellow Yellow, Straw    Appearance, UA Clear Clear    pH, UA <=5.0 5.0 - 8.0    Specific Gravity, UA 1.018 1.005 - 1.030    Glucose, UA Negative Negative    Ketones, UA Negative Negative    Bilirubin, UA Negative Negative    Blood, UA Moderate (2+) (A) Negative    Protein, UA Negative Negative    Leuk Esterase, UA Trace (A) Negative    Nitrite, UA Negative Negative    Urobilinogen, UA 0.2 E.U./dL 0.2 - 1.0 E.U./dL   CBC Auto Differential    Collection Time: 02/04/21  8:57 AM    Specimen: Blood   Result Value Ref Range    WBC 4.64 3.40 - 10.80 10*3/mm3    RBC 4.57 3.77 - 5.28 10*6/mm3    Hemoglobin 13.7 12.0 - 15.9 g/dL    Hematocrit 41.6 34.0 - 46.6 %    MCV 91.0 79.0 - 97.0 fL    MCH 30.0 26.6 - 33.0 pg    MCHC 32.9 31.5 - 35.7 g/dL    RDW 12.6 12.3 - 15.4 %    RDW-SD 41.4 37.0 - 54.0 fl    MPV 9.9 6.0 - 12.0 fL    Platelets 269 140 - 450 10*3/mm3    Neutrophil % 58.1 42.7 - 76.0 %    Lymphocyte % 29.3 19.6 - 45.3 %    Monocyte % 9.9 5.0 - 12.0 %    Eosinophil % 1.9 0.3 - 6.2 %    Basophil % 0.6 0.0 - 1.5 %    Immature Grans % 0.2 0.0 - 0.5 %    Neutrophils, Absolute 2.69 1.70 - 7.00 10*3/mm3    Lymphocytes, Absolute 1.36 0.70 - 3.10 10*3/mm3    Monocytes, Absolute 0.46 0.10 - 0.90 10*3/mm3    Eosinophils, Absolute 0.09 0.00 - 0.40 10*3/mm3    Basophils, Absolute 0.03 0.00 - 0.20 10*3/mm3    Immature Grans, Absolute 0.01 0.00 - 0.05 10*3/mm3    nRBC 0.0 0.0 - 0.2 /100 WBC   Urinalysis, Microscopic Only - Urine, Clean Catch    Collection Time: 02/04/21  8:57 AM    Specimen: Urine, Clean Catch   Result Value Ref Range    RBC, UA 3-5 (A) None Seen, 0-2 /HPF    WBC, UA 3-5 (A) None Seen, 0-2 /HPF    Bacteria, UA 3+ (A) None Seen /HPF     Squamous Epithelial Cells, UA 0-2 None Seen, 0-2 /HPF    Hyaline Casts, UA None Seen None Seen /LPF    Methodology Automated Microscopy    ECG 12 Lead    Collection Time: 02/04/21  9:40 AM   Result Value Ref Range    QT Interval 365 ms     Objective   There were no vitals filed for this visit.  There is no height or weight on file to calculate BMI.  Physical Exam  Constitutional:       Appearance: Normal appearance.   Neurological:      Mental Status: She is alert.           Diagnoses and all orders for this visit:    1. Acute cystitis with hematuria (Primary)  -     Urine Culture - Urine, Urine, Clean Catch; Future    pt was given bactrim in February and finished.  Return if symptoms worsen or fail to improve.

## 2021-03-19 ENCOUNTER — TELEPHONE (OUTPATIENT)
Dept: FAMILY MEDICINE CLINIC | Facility: CLINIC | Age: 66
End: 2021-03-19

## 2021-03-19 NOTE — TELEPHONE ENCOUNTER
Caller: Oneida See    Relationship: Self    Best call back number: [phone]     Caller requesting test results: YES     What test was performed: URINE     When was the test performed: WEDNESDAY      Additional notes: PATIENT NEEDS HER RESULTS SO SHE CAN SCHEDULE FOR ANOTHER APPOINTMENT

## 2021-03-22 ENCOUNTER — HOSPITAL ENCOUNTER (OUTPATIENT)
Dept: GENERAL RADIOLOGY | Facility: HOSPITAL | Age: 66
Discharge: HOME | End: 2021-03-22

## 2021-03-22 ENCOUNTER — APPOINTMENT (OUTPATIENT)
Dept: PREADMISSION TESTING | Facility: HOSPITAL | Age: 66
End: 2021-03-22

## 2021-03-22 ENCOUNTER — BULK ORDERING (OUTPATIENT)
Dept: CASE MANAGEMENT | Facility: OTHER | Age: 66
End: 2021-03-22

## 2021-03-22 VITALS
RESPIRATION RATE: 18 BRPM | OXYGEN SATURATION: 99 % | HEART RATE: 69 BPM | HEIGHT: 66 IN | TEMPERATURE: 97.7 F | SYSTOLIC BLOOD PRESSURE: 130 MMHG | DIASTOLIC BLOOD PRESSURE: 75 MMHG | BODY MASS INDEX: 33.59 KG/M2 | WEIGHT: 209 LBS

## 2021-03-22 DIAGNOSIS — Z23 IMMUNIZATION DUE: ICD-10-CM

## 2021-03-22 LAB
ABO GROUP BLD: NORMAL
ALBUMIN SERPL-MCNC: 3.9 G/DL (ref 3.5–5.2)
ALBUMIN/GLOB SERPL: 1.6 G/DL
ALP SERPL-CCNC: 77 U/L (ref 39–117)
ALT SERPL W P-5'-P-CCNC: 10 U/L (ref 1–33)
ANION GAP SERPL CALCULATED.3IONS-SCNC: 7.5 MMOL/L (ref 5–15)
APTT PPP: 28.9 SECONDS (ref 22.7–35.4)
AST SERPL-CCNC: 13 U/L (ref 1–32)
BACTERIA UR QL AUTO: NORMAL /HPF
BASOPHILS # BLD AUTO: 0.04 10*3/MM3 (ref 0–0.2)
BASOPHILS NFR BLD AUTO: 1 % (ref 0–1.5)
BILIRUB SERPL-MCNC: 0.5 MG/DL (ref 0–1.2)
BILIRUB UR QL STRIP: NEGATIVE
BLD GP AB SCN SERPL QL: NEGATIVE
BUN SERPL-MCNC: 13 MG/DL (ref 8–23)
BUN/CREAT SERPL: 18.6 (ref 7–25)
CALCIUM SPEC-SCNC: 8.6 MG/DL (ref 8.6–10.5)
CHLORIDE SERPL-SCNC: 107 MMOL/L (ref 98–107)
CLARITY UR: CLEAR
CO2 SERPL-SCNC: 27.5 MMOL/L (ref 22–29)
COLOR UR: YELLOW
CREAT SERPL-MCNC: 0.7 MG/DL (ref 0.57–1)
DEPRECATED RDW RBC AUTO: 40.6 FL (ref 37–54)
EOSINOPHIL # BLD AUTO: 0.1 10*3/MM3 (ref 0–0.4)
EOSINOPHIL NFR BLD AUTO: 2.4 % (ref 0.3–6.2)
ERYTHROCYTE [DISTWIDTH] IN BLOOD BY AUTOMATED COUNT: 12.4 % (ref 12.3–15.4)
GFR SERPL CREATININE-BSD FRML MDRD: 83 ML/MIN/1.73
GLOBULIN UR ELPH-MCNC: 2.4 GM/DL
GLUCOSE SERPL-MCNC: 85 MG/DL (ref 65–99)
GLUCOSE UR STRIP-MCNC: NEGATIVE MG/DL
HCT VFR BLD AUTO: 39.2 % (ref 34–46.6)
HGB BLD-MCNC: 13.4 G/DL (ref 12–15.9)
HGB UR QL STRIP.AUTO: ABNORMAL
HYALINE CASTS UR QL AUTO: NORMAL /LPF
IMM GRANULOCYTES # BLD AUTO: 0.01 10*3/MM3 (ref 0–0.05)
IMM GRANULOCYTES NFR BLD AUTO: 0.2 % (ref 0–0.5)
INR PPP: 1 (ref 0.9–1.1)
KETONES UR QL STRIP: NEGATIVE
LEUKOCYTE ESTERASE UR QL STRIP.AUTO: NEGATIVE
LYMPHOCYTES # BLD AUTO: 1.49 10*3/MM3 (ref 0.7–3.1)
LYMPHOCYTES NFR BLD AUTO: 36.1 % (ref 19.6–45.3)
MCH RBC QN AUTO: 31 PG (ref 26.6–33)
MCHC RBC AUTO-ENTMCNC: 34.2 G/DL (ref 31.5–35.7)
MCV RBC AUTO: 90.7 FL (ref 79–97)
MONOCYTES # BLD AUTO: 0.44 10*3/MM3 (ref 0.1–0.9)
MONOCYTES NFR BLD AUTO: 10.7 % (ref 5–12)
NEUTROPHILS NFR BLD AUTO: 2.05 10*3/MM3 (ref 1.7–7)
NEUTROPHILS NFR BLD AUTO: 49.6 % (ref 42.7–76)
NITRITE UR QL STRIP: NEGATIVE
NRBC BLD AUTO-RTO: 0 /100 WBC (ref 0–0.2)
PH UR STRIP.AUTO: <=5 [PH] (ref 5–8)
PLATELET # BLD AUTO: 251 10*3/MM3 (ref 140–450)
PMV BLD AUTO: 10.1 FL (ref 6–12)
POTASSIUM SERPL-SCNC: 4.1 MMOL/L (ref 3.5–5.2)
PROT SERPL-MCNC: 6.3 G/DL (ref 6–8.5)
PROT UR QL STRIP: NEGATIVE
PROTHROMBIN TIME: 13 SECONDS (ref 11.7–14.2)
QT INTERVAL: 386 MS
RBC # BLD AUTO: 4.32 10*6/MM3 (ref 3.77–5.28)
RBC # UR: NORMAL /HPF
REF LAB TEST METHOD: NORMAL
RH BLD: POSITIVE
SODIUM SERPL-SCNC: 142 MMOL/L (ref 136–145)
SP GR UR STRIP: 1.01 (ref 1–1.03)
SQUAMOUS #/AREA URNS HPF: NORMAL /HPF
T&S EXPIRATION DATE: NORMAL
UROBILINOGEN UR QL STRIP: ABNORMAL
WBC # BLD AUTO: 4.13 10*3/MM3 (ref 3.4–10.8)
WBC UR QL AUTO: NORMAL /HPF

## 2021-03-22 PROCEDURE — 86901 BLOOD TYPING SEROLOGIC RH(D): CPT

## 2021-03-22 PROCEDURE — 86850 RBC ANTIBODY SCREEN: CPT

## 2021-03-22 PROCEDURE — 86900 BLOOD TYPING SEROLOGIC ABO: CPT

## 2021-03-22 PROCEDURE — 85730 THROMBOPLASTIN TIME PARTIAL: CPT

## 2021-03-22 PROCEDURE — 73502 X-RAY EXAM HIP UNI 2-3 VIEWS: CPT

## 2021-03-22 PROCEDURE — 81001 URINALYSIS AUTO W/SCOPE: CPT

## 2021-03-22 PROCEDURE — 36415 COLL VENOUS BLD VENIPUNCTURE: CPT

## 2021-03-22 PROCEDURE — 93005 ELECTROCARDIOGRAM TRACING: CPT

## 2021-03-22 PROCEDURE — 85025 COMPLETE CBC W/AUTO DIFF WBC: CPT

## 2021-03-22 PROCEDURE — 71046 X-RAY EXAM CHEST 2 VIEWS: CPT

## 2021-03-22 PROCEDURE — 93010 ELECTROCARDIOGRAM REPORT: CPT | Performed by: INTERNAL MEDICINE

## 2021-03-22 PROCEDURE — 85610 PROTHROMBIN TIME: CPT

## 2021-03-22 PROCEDURE — 87086 URINE CULTURE/COLONY COUNT: CPT

## 2021-03-22 PROCEDURE — 80053 COMPREHEN METABOLIC PANEL: CPT

## 2021-03-22 NOTE — DISCHARGE INSTRUCTIONS
Take the following medications the morning of surgery: SYNTHROID    ARRIVE AT 10 AM ON 4/5/21    If you are on prescription narcotic pain medication to control your pain you may also take that medication the morning of surgery.    General Instructions:  • Do not eat solid food after midnight the night before surgery.  • You may drink clear liquids day of surgery but must stop at least one hour before your hospital arrival time.  • It is beneficial for you to have a clear drink that contains carbohydrates the day of surgery.  We suggest a 12 to 20 ounce bottle of Gatorade or Powerade for non-diabetic patients or a 12 to 20 ounce bottle of G2 or Powerade Zero for diabetic patients. (Pediatric patients, are not advised to drink a 12 to 20 ounce carbohydrate drink)    Clear liquids are liquids you can see through.  Nothing red in color.     Plain water                               Sports drinks  Sodas                                   Gelatin (Jell-O)  Fruit juices without pulp such as white grape juice and apple juice  Popsicles that contain no fruit or yogurt  Tea or coffee (no cream or milk added)  Gatorade / Powerade  G2 / Powerade Zero    • Infants may have breast milk up to four hours before surgery.  • Infants drinking formula may drink formula up to six hours before surgery.   • Patients who avoid smoking, chewing tobacco and alcohol for 4 weeks prior to surgery have a reduced risk of post-operative complications.  Quit smoking as many days before surgery as you can.  • Do not smoke, use chewing tobacco or drink alcohol the day of surgery.   • If applicable bring your C-PAP/ BI-PAP machine.  • Bring any papers given to you in the doctor’s office.  • Wear clean comfortable clothes.  • Do not wear contact lenses, false eyelashes or make-up.  Bring a case for your glasses.   • Bring crutches or walker if applicable.  • Remove all piercings.  Leave jewelry and any other valuables at home.  • Hair extensions with  metal clips must be removed prior to surgery.  • The Pre-Admission Testing nurse will instruct you to bring medications if unable to obtain an accurate list in Pre-Admission Testing.        If you were given a blood bank ID arm band remember to bring it with you the day of surgery.    Preventing a Surgical Site Infection:  • For 2 to 3 days before surgery, avoid shaving with a razor because the razor can irritate skin and make it easier to develop an infection.    • Any areas of open skin can increase the risk of a post-operative wound infection by allowing bacteria to enter and travel throughout the body.  Notify your surgeon if you have any skin wounds / rashes even if it is not near the expected surgical site.  The area will need assessed to determine if surgery should be delayed until it is healed.  • The night prior to surgery shower using a fresh bar of anti-bacterial soap (such as Dial) and clean washcloth.  Sleep in a clean bed with clean clothing.  Do not allow pets to sleep with you.  • Shower on the morning of surgery using a fresh bar of anti-bacterial soap (such as Dial) and clean washcloth.  Dry with a clean towel and dress in clean clothing.  • Ask your surgeon if you will be receiving antibiotics prior to surgery.  • Make sure you, your family, and all healthcare providers clean their hands with soap and water or an alcohol based hand  before caring for you or your wound.    Day of surgery:  Your arrival time is approximately two hours before your scheduled surgery time.  Upon arrival, a Pre-op nurse and Anesthesiologist will review your health history, obtain vital signs, and answer questions you may have.  The only belongings needed at this time will be a list of your home medications and if applicable your C-PAP/BI-PAP machine.  A Pre-op nurse will start an IV and you may receive medication in preparation for surgery, including something to help you relax.     Please be aware that surgery  does come with discomfort.  We want to make every effort to control your discomfort so please discuss any uncontrolled symptoms with your nurse.   Your doctor will most likely have prescribed pain medications.      If you are going home after surgery you will receive individualized written care instructions before being discharged.  A responsible adult must drive you to and from the hospital on the day of your surgery and stay with you for 24 hours.  Discharge prescriptions can be filled by the hospital pharmacy during regular pharmacy hours.  If you are having surgery late in the day/evening your prescription may be e-prescribed to your pharmacy.  Please verify your pharmacy hours or chose a 24 hour pharmacy to avoid not having access to your prescription because your pharmacy has closed for the day.    If you are staying overnight following surgery, you will be transported to your hospital room following the recovery period.  [facility] has all private rooms.    If you have any questions please call Pre-Admission Testing at [phone].  Deductibles and co-payments are collected on the day of service. Please be prepared to pay the required co-pay, deductible or deposit on the day of service as defined by your plan.    Patient Education for Self-Quarantine Process    Following your COVID testing, we strongly recommend that you do not leave your home after you have been tested for COVID except to get medical care. This includes not going to work, school or to public areas.  If this is not possible for you to do please limit your activities to only required outings.  Be sure to wear a mask when you are with other people, practice social distancing and wash your hands frequently.      The following items provide additional details to keep you safe.  • Wash your hands with soap and water frequently for at least 20 seconds.   • Avoid touching your eyes, nose and mouth with unwashed hands.  • Do not  share anything - utensils, towels, food from the same bowl.   • Have your own utensils, drinking glass, dishes, towels and bedding.   • Do not have visitors.   • Do use FaceTime to stay in touch with family and friends.  • You should stay in a specific room away from others if possible.   • Stay at least 6 feet away from others in the home if you cannot have a dedicated room to yourself.   • Do not snuggle with your pet. While the CDC says there is no evidence that pets can spread COVID-19 or be infected from humans, it is probably best to avoid “petting, snuggling, being kissed or licked and sharing food (during self-quarantine)”, according to the CDC.   • Sanitize household surfaces daily. Include all high touch areas (door handles, light switches, phones, countertops, etc.)  • Do not share a bathroom with others, if possible.   • Wear a mask around others in your home if you are unable to stay in a separate room or 6 feet apart. If  you are unable to wear a mask, have your family member wear a mask if they must be within 6 feet of you.   Call your surgeon immediately if you experience any of the following symptoms:  • Sore Throat  • Shortness of Breath or difficulty breathing  • Cough  • Chills  • Body soreness or muscle pain  • Headache  • Fever  • New loss of taste or smell  • Do not arrive for your surgery ill.  Your procedure will need to be rescheduled to another time.  You will need to call your physician before the day of surgery to avoid any unnecessary exposure to hospital staff as well as other patients.    CHLORHEXIDINE CLOTH INSTRUCTIONS  The morning of surgery follow these instructions using the Chlorhexidine cloths you've been given.  These steps reduce bacteria on the body.  Do not use the cloths near your eyes, ears mouth, genitalia or on open wounds.  Throw the cloths away after use but do not try to flush them down a toilet.      • Open and remove one cloth at a time from the package.    • Leave  the cloth unfolded and begin the bathing.  • Massage the skin with the cloths using gentle pressure to remove bacteria.  Do not scrub harshly.   • Follow the steps below with one 2% CHG cloth per area (6 total cloths).  • One cloth for neck, shoulders and chest.  • One cloth for both arms, hands, fingers and underarms (do underarms last).  • One cloth for the abdomen followed by groin.  • One cloth for right leg and foot including between the toes.  • One cloth for left leg and foot including between the toes.  • The last cloth is to be used for the back of the neck, back and buttocks.    Allow the CHG to air dry 3 minutes on the skin which will give it time to work and decrease the chance of irritation.  The skin may feel sticky until it is dry.  Do not rinse with water or any other liquid or you will lose the beneficial effects of the CHG.  If mild skin irritation occurs, do rinse the skin to remove the CHG.  Report this to the nurse at time of admission.  Do not apply lotions, creams, ointments, deodorants or perfumes after using the clothes. Dress in clean clothes before coming to the hospital.    BACTROBAN NASAL OINTMENT  There are many germs normally in your nose. Bactroban is an ointment that will help reduce these germs. Please follow these instructions for Bactroban use:      ____The day before surgery in the morning  Date________    ____The day before surgery in the evening              Date________    ____The day of surgery in the morning    Date________    **Squirt ½ package of Bactroban Ointment onto a cotton applicator and apply to inside of 1st nostril.  Squirt the remaining Bactroban and apply to the inside of the other nostril.

## 2021-03-23 LAB — BACTERIA SPEC AEROBE CULT: NO GROWTH

## 2021-04-02 ENCOUNTER — LAB (OUTPATIENT)
Dept: LAB | Facility: HOSPITAL | Age: 66
End: 2021-04-02

## 2021-04-02 DIAGNOSIS — Z01.818 OTHER SPECIFIED PRE-OPERATIVE EXAMINATION: ICD-10-CM

## 2021-04-02 PROCEDURE — U0004 COV-19 TEST NON-CDC HGH THRU: HCPCS

## 2021-04-02 PROCEDURE — C9803 HOPD COVID-19 SPEC COLLECT: HCPCS

## 2021-04-03 LAB — SARS-COV-2 ORF1AB RESP QL NAA+PROBE: NOT DETECTED

## 2021-04-05 ENCOUNTER — ANESTHESIA (OUTPATIENT)
Dept: PERIOP | Facility: HOSPITAL | Age: 66
End: 2021-04-05

## 2021-04-05 ENCOUNTER — HOSPITAL ENCOUNTER (OUTPATIENT)
Facility: HOSPITAL | Age: 66
Discharge: HOME | End: 2021-04-06
Attending: ORTHOPAEDIC SURGERY | Admitting: ORTHOPAEDIC SURGERY

## 2021-04-05 ENCOUNTER — ANESTHESIA EVENT (OUTPATIENT)
Dept: PERIOP | Facility: HOSPITAL | Age: 66
End: 2021-04-05

## 2021-04-05 ENCOUNTER — APPOINTMENT (OUTPATIENT)
Dept: GENERAL RADIOLOGY | Facility: HOSPITAL | Age: 66
End: 2021-04-05

## 2021-04-05 DIAGNOSIS — M16.12 PRIMARY OSTEOARTHRITIS OF LEFT HIP: Primary | ICD-10-CM

## 2021-04-05 PROBLEM — M16.9 DEGENERATIVE JOINT DISEASE (DJD) OF HIP: Status: ACTIVE | Noted: 2021-04-05

## 2021-04-05 PROBLEM — M19.90 DJD (DEGENERATIVE JOINT DISEASE): Status: ACTIVE | Noted: 2021-04-05

## 2021-04-05 PROCEDURE — 25010000002 NEOSTIGMINE 5 MG/10ML SOLUTION: Performed by: NURSE ANESTHETIST, CERTIFIED REGISTERED

## 2021-04-05 PROCEDURE — 25010000002 KETOROLAC TROMETHAMINE PER 15 MG: Performed by: ORTHOPAEDIC SURGERY

## 2021-04-05 PROCEDURE — 25010000002 ONDANSETRON PER 1 MG: Performed by: NURSE ANESTHETIST, CERTIFIED REGISTERED

## 2021-04-05 PROCEDURE — 25010000002 CEFAZOLIN PER 500 MG: Performed by: NURSE ANESTHETIST, CERTIFIED REGISTERED

## 2021-04-05 PROCEDURE — C9290 INJ, BUPIVACAINE LIPOSOME: HCPCS | Performed by: ORTHOPAEDIC SURGERY

## 2021-04-05 PROCEDURE — 25010000003 BUPIVACAINE LIPOSOME 1.3 % SUSPENSION 20 ML VIAL: Performed by: ORTHOPAEDIC SURGERY

## 2021-04-05 PROCEDURE — 25010000002 MIDAZOLAM PER 1 MG: Performed by: ANESTHESIOLOGY

## 2021-04-05 PROCEDURE — 25010000003 CEFAZOLIN IN DEXTROSE 2-4 GM/100ML-% SOLUTION: Performed by: ORTHOPAEDIC SURGERY

## 2021-04-05 PROCEDURE — 25010000002 FENTANYL CITRATE (PF) 100 MCG/2ML SOLUTION: Performed by: NURSE ANESTHETIST, CERTIFIED REGISTERED

## 2021-04-05 PROCEDURE — 25010000002 PROPOFOL 10 MG/ML EMULSION: Performed by: NURSE ANESTHETIST, CERTIFIED REGISTERED

## 2021-04-05 PROCEDURE — 25010000002 HYDROMORPHONE PER 4 MG: Performed by: NURSE ANESTHETIST, CERTIFIED REGISTERED

## 2021-04-05 PROCEDURE — C1776 JOINT DEVICE (IMPLANTABLE): HCPCS | Performed by: ORTHOPAEDIC SURGERY

## 2021-04-05 PROCEDURE — 25010000002 VANCOMYCIN 10 G RECONSTITUTED SOLUTION: Performed by: ORTHOPAEDIC SURGERY

## 2021-04-05 PROCEDURE — 25010000002 DEXAMETHASONE PER 1 MG: Performed by: NURSE ANESTHETIST, CERTIFIED REGISTERED

## 2021-04-05 PROCEDURE — 73501 X-RAY EXAM HIP UNI 1 VIEW: CPT

## 2021-04-05 DEVICE — ARTICUL/EZE FEMORAL HEAD DIAMETER 22.225MM +4 12/14 TAPER
Type: IMPLANTABLE DEVICE | Site: HIP | Status: FUNCTIONAL
Brand: ARTICUL/EZE

## 2021-04-05 DEVICE — OR3O DUAL MOBILITY XLPE INSERT 22/36
Type: IMPLANTABLE DEVICE | Site: HIP | Status: FUNCTIONAL
Brand: OR3O DUAL MOBILITY

## 2021-04-05 RX ORDER — DIPHENHYDRAMINE HYDROCHLORIDE 50 MG/ML
12.5 INJECTION INTRAMUSCULAR; INTRAVENOUS
Status: DISCONTINUED | OUTPATIENT
Start: 2021-04-05 | End: 2021-04-05 | Stop reason: HOSPADM

## 2021-04-05 RX ORDER — MIDAZOLAM HYDROCHLORIDE 1 MG/ML
1 INJECTION INTRAMUSCULAR; INTRAVENOUS
Status: DISCONTINUED | OUTPATIENT
Start: 2021-04-05 | End: 2021-04-05 | Stop reason: HOSPADM

## 2021-04-05 RX ORDER — FENTANYL CITRATE 50 UG/ML
50 INJECTION, SOLUTION INTRAMUSCULAR; INTRAVENOUS
Status: DISCONTINUED | OUTPATIENT
Start: 2021-04-05 | End: 2021-04-05 | Stop reason: HOSPADM

## 2021-04-05 RX ORDER — LABETALOL HYDROCHLORIDE 5 MG/ML
5 INJECTION, SOLUTION INTRAVENOUS
Status: DISCONTINUED | OUTPATIENT
Start: 2021-04-05 | End: 2021-04-05 | Stop reason: HOSPADM

## 2021-04-05 RX ORDER — ROCURONIUM BROMIDE 10 MG/ML
INJECTION, SOLUTION INTRAVENOUS AS NEEDED
Status: DISCONTINUED | OUTPATIENT
Start: 2021-04-05 | End: 2021-04-05 | Stop reason: SURG

## 2021-04-05 RX ORDER — DOCUSATE SODIUM 100 MG/1
100 CAPSULE, LIQUID FILLED ORAL 2 TIMES DAILY PRN
Status: DISCONTINUED | OUTPATIENT
Start: 2021-04-05 | End: 2021-04-06 | Stop reason: HOSPADM

## 2021-04-05 RX ORDER — ONDANSETRON 2 MG/ML
4 INJECTION INTRAMUSCULAR; INTRAVENOUS EVERY 6 HOURS PRN
Status: DISCONTINUED | OUTPATIENT
Start: 2021-04-05 | End: 2021-04-06 | Stop reason: HOSPADM

## 2021-04-05 RX ORDER — CEFAZOLIN SODIUM 500 MG/2.2ML
INJECTION, POWDER, FOR SOLUTION INTRAMUSCULAR; INTRAVENOUS AS NEEDED
Status: DISCONTINUED | OUTPATIENT
Start: 2021-04-05 | End: 2021-04-05 | Stop reason: SURG

## 2021-04-05 RX ORDER — ACETAMINOPHEN 325 MG/1
650 TABLET ORAL EVERY 4 HOURS PRN
Status: DISCONTINUED | OUTPATIENT
Start: 2021-04-05 | End: 2021-04-06

## 2021-04-05 RX ORDER — DEXAMETHASONE SODIUM PHOSPHATE 10 MG/ML
INJECTION INTRAMUSCULAR; INTRAVENOUS AS NEEDED
Status: DISCONTINUED | OUTPATIENT
Start: 2021-04-05 | End: 2021-04-05 | Stop reason: SURG

## 2021-04-05 RX ORDER — ACETAMINOPHEN 325 MG/1
325 TABLET ORAL EVERY 4 HOURS PRN
Status: DISCONTINUED | OUTPATIENT
Start: 2021-04-05 | End: 2021-04-06

## 2021-04-05 RX ORDER — TRANEXAMIC ACID 100 MG/ML
INJECTION, SOLUTION INTRAVENOUS AS NEEDED
Status: DISCONTINUED | OUTPATIENT
Start: 2021-04-05 | End: 2021-04-05 | Stop reason: SURG

## 2021-04-05 RX ORDER — CEFAZOLIN SODIUM 2 G/100ML
2 INJECTION, SOLUTION INTRAVENOUS EVERY 8 HOURS
Status: COMPLETED | OUTPATIENT
Start: 2021-04-05 | End: 2021-04-06

## 2021-04-05 RX ORDER — EPHEDRINE SULFATE 50 MG/ML
5 INJECTION, SOLUTION INTRAVENOUS ONCE AS NEEDED
Status: DISCONTINUED | OUTPATIENT
Start: 2021-04-05 | End: 2021-04-05 | Stop reason: HOSPADM

## 2021-04-05 RX ORDER — ASPIRIN 325 MG
325 TABLET, DELAYED RELEASE (ENTERIC COATED) ORAL DAILY
Status: DISCONTINUED | OUTPATIENT
Start: 2021-04-05 | End: 2021-04-06 | Stop reason: HOSPADM

## 2021-04-05 RX ORDER — ONDANSETRON 4 MG/1
4 TABLET, FILM COATED ORAL EVERY 6 HOURS PRN
Status: DISCONTINUED | OUTPATIENT
Start: 2021-04-05 | End: 2021-04-06 | Stop reason: HOSPADM

## 2021-04-05 RX ORDER — DIPHENHYDRAMINE HCL 25 MG
25 CAPSULE ORAL
Status: DISCONTINUED | OUTPATIENT
Start: 2021-04-05 | End: 2021-04-05 | Stop reason: HOSPADM

## 2021-04-05 RX ORDER — ONDANSETRON 2 MG/ML
4 INJECTION INTRAMUSCULAR; INTRAVENOUS ONCE AS NEEDED
Status: DISCONTINUED | OUTPATIENT
Start: 2021-04-05 | End: 2021-04-05 | Stop reason: HOSPADM

## 2021-04-05 RX ORDER — FENTANYL CITRATE 50 UG/ML
INJECTION, SOLUTION INTRAMUSCULAR; INTRAVENOUS AS NEEDED
Status: DISCONTINUED | OUTPATIENT
Start: 2021-04-05 | End: 2021-04-05 | Stop reason: SURG

## 2021-04-05 RX ORDER — OXYCODONE HYDROCHLORIDE AND ACETAMINOPHEN 5; 325 MG/1; MG/1
2 TABLET ORAL EVERY 4 HOURS PRN
Status: DISCONTINUED | OUTPATIENT
Start: 2021-04-05 | End: 2021-04-06

## 2021-04-05 RX ORDER — ACETAMINOPHEN 500 MG
1000 TABLET ORAL ONCE
Status: COMPLETED | OUTPATIENT
Start: 2021-04-05 | End: 2021-04-05

## 2021-04-05 RX ORDER — SODIUM CHLORIDE 0.9 % (FLUSH) 0.9 %
3-10 SYRINGE (ML) INJECTION AS NEEDED
Status: DISCONTINUED | OUTPATIENT
Start: 2021-04-05 | End: 2021-04-05 | Stop reason: HOSPADM

## 2021-04-05 RX ORDER — GLYCOPYRROLATE 0.2 MG/ML
INJECTION INTRAMUSCULAR; INTRAVENOUS AS NEEDED
Status: DISCONTINUED | OUTPATIENT
Start: 2021-04-05 | End: 2021-04-05 | Stop reason: SURG

## 2021-04-05 RX ORDER — KETOROLAC TROMETHAMINE 30 MG/ML
15 INJECTION, SOLUTION INTRAMUSCULAR; INTRAVENOUS EVERY 6 HOURS PRN
Status: DISCONTINUED | OUTPATIENT
Start: 2021-04-05 | End: 2021-04-06 | Stop reason: HOSPADM

## 2021-04-05 RX ORDER — HYDROMORPHONE HYDROCHLORIDE 1 MG/ML
0.5 INJECTION, SOLUTION INTRAMUSCULAR; INTRAVENOUS; SUBCUTANEOUS
Status: DISCONTINUED | OUTPATIENT
Start: 2021-04-05 | End: 2021-04-05 | Stop reason: HOSPADM

## 2021-04-05 RX ORDER — FERROUS SULFATE 325(65) MG
325 TABLET ORAL
Status: DISCONTINUED | OUTPATIENT
Start: 2021-04-06 | End: 2021-04-06 | Stop reason: HOSPADM

## 2021-04-05 RX ORDER — PROPOFOL 10 MG/ML
VIAL (ML) INTRAVENOUS AS NEEDED
Status: DISCONTINUED | OUTPATIENT
Start: 2021-04-05 | End: 2021-04-05 | Stop reason: SURG

## 2021-04-05 RX ORDER — SODIUM CHLORIDE, SODIUM LACTATE, POTASSIUM CHLORIDE, CALCIUM CHLORIDE 600; 310; 30; 20 MG/100ML; MG/100ML; MG/100ML; MG/100ML
9 INJECTION, SOLUTION INTRAVENOUS CONTINUOUS
Status: DISCONTINUED | OUTPATIENT
Start: 2021-04-05 | End: 2021-04-06 | Stop reason: HOSPADM

## 2021-04-05 RX ORDER — ONDANSETRON 2 MG/ML
INJECTION INTRAMUSCULAR; INTRAVENOUS AS NEEDED
Status: DISCONTINUED | OUTPATIENT
Start: 2021-04-05 | End: 2021-04-05 | Stop reason: SURG

## 2021-04-05 RX ORDER — LEVOTHYROXINE SODIUM 0.12 MG/1
125 TABLET ORAL DAILY
Status: DISCONTINUED | OUTPATIENT
Start: 2021-04-05 | End: 2021-04-06 | Stop reason: HOSPADM

## 2021-04-05 RX ORDER — NALOXONE HCL 0.4 MG/ML
0.1 VIAL (ML) INJECTION
Status: DISCONTINUED | OUTPATIENT
Start: 2021-04-05 | End: 2021-04-06 | Stop reason: HOSPADM

## 2021-04-05 RX ORDER — MIDAZOLAM HYDROCHLORIDE 1 MG/ML
0.5 INJECTION INTRAMUSCULAR; INTRAVENOUS
Status: DISCONTINUED | OUTPATIENT
Start: 2021-04-05 | End: 2021-04-05 | Stop reason: HOSPADM

## 2021-04-05 RX ORDER — PROMETHAZINE HYDROCHLORIDE 25 MG/1
25 SUPPOSITORY RECTAL ONCE AS NEEDED
Status: DISCONTINUED | OUTPATIENT
Start: 2021-04-05 | End: 2021-04-05 | Stop reason: HOSPADM

## 2021-04-05 RX ORDER — ACETAMINOPHEN 650 MG/1
650 SUPPOSITORY RECTAL EVERY 4 HOURS PRN
Status: DISCONTINUED | OUTPATIENT
Start: 2021-04-05 | End: 2021-04-06

## 2021-04-05 RX ORDER — SODIUM CHLORIDE, SODIUM LACTATE, POTASSIUM CHLORIDE, CALCIUM CHLORIDE 600; 310; 30; 20 MG/100ML; MG/100ML; MG/100ML; MG/100ML
100 INJECTION, SOLUTION INTRAVENOUS CONTINUOUS
Status: DISCONTINUED | OUTPATIENT
Start: 2021-04-05 | End: 2021-04-06 | Stop reason: HOSPADM

## 2021-04-05 RX ORDER — SODIUM CHLORIDE 0.9 % (FLUSH) 0.9 %
3 SYRINGE (ML) INJECTION EVERY 12 HOURS SCHEDULED
Status: DISCONTINUED | OUTPATIENT
Start: 2021-04-05 | End: 2021-04-05 | Stop reason: HOSPADM

## 2021-04-05 RX ORDER — OXYCODONE HYDROCHLORIDE AND ACETAMINOPHEN 5; 325 MG/1; MG/1
1 TABLET ORAL EVERY 4 HOURS PRN
Status: DISCONTINUED | OUTPATIENT
Start: 2021-04-05 | End: 2021-04-06

## 2021-04-05 RX ORDER — FAMOTIDINE 10 MG/ML
20 INJECTION, SOLUTION INTRAVENOUS ONCE
Status: COMPLETED | OUTPATIENT
Start: 2021-04-05 | End: 2021-04-05

## 2021-04-05 RX ORDER — LIDOCAINE HYDROCHLORIDE 10 MG/ML
0.5 INJECTION, SOLUTION EPIDURAL; INFILTRATION; INTRACAUDAL; PERINEURAL ONCE AS NEEDED
Status: DISCONTINUED | OUTPATIENT
Start: 2021-04-05 | End: 2021-04-05 | Stop reason: HOSPADM

## 2021-04-05 RX ORDER — PROMETHAZINE HYDROCHLORIDE 25 MG/1
25 TABLET ORAL ONCE AS NEEDED
Status: DISCONTINUED | OUTPATIENT
Start: 2021-04-05 | End: 2021-04-05 | Stop reason: HOSPADM

## 2021-04-05 RX ORDER — NALOXONE HCL 0.4 MG/ML
0.2 VIAL (ML) INJECTION AS NEEDED
Status: DISCONTINUED | OUTPATIENT
Start: 2021-04-05 | End: 2021-04-05 | Stop reason: HOSPADM

## 2021-04-05 RX ORDER — EPHEDRINE SULFATE 50 MG/ML
INJECTION, SOLUTION INTRAVENOUS AS NEEDED
Status: DISCONTINUED | OUTPATIENT
Start: 2021-04-05 | End: 2021-04-05 | Stop reason: SURG

## 2021-04-05 RX ORDER — NEOSTIGMINE METHYLSULFATE 0.5 MG/ML
INJECTION, SOLUTION INTRAVENOUS AS NEEDED
Status: DISCONTINUED | OUTPATIENT
Start: 2021-04-05 | End: 2021-04-05 | Stop reason: SURG

## 2021-04-05 RX ADMIN — ROCURONIUM BROMIDE 10 MG: 50 INJECTION INTRAVENOUS at 14:25

## 2021-04-05 RX ADMIN — VANCOMYCIN HYDROCHLORIDE 1500 MG: 10 INJECTION, POWDER, LYOPHILIZED, FOR SOLUTION INTRAVENOUS at 12:11

## 2021-04-05 RX ADMIN — CEFAZOLIN SODIUM 2 G: 2 INJECTION, SOLUTION INTRAVENOUS at 21:55

## 2021-04-05 RX ADMIN — FENTANYL CITRATE 50 MCG: 50 INJECTION, SOLUTION INTRAMUSCULAR; INTRAVENOUS at 15:31

## 2021-04-05 RX ADMIN — ROCURONIUM BROMIDE 40 MG: 50 INJECTION INTRAVENOUS at 13:49

## 2021-04-05 RX ADMIN — FENTANYL CITRATE 100 MCG: 50 INJECTION INTRAMUSCULAR; INTRAVENOUS at 13:49

## 2021-04-05 RX ADMIN — EPHEDRINE SULFATE 10 MG: 50 INJECTION INTRAVENOUS at 14:24

## 2021-04-05 RX ADMIN — CEFAZOLIN 2 G: 225 INJECTION, POWDER, FOR SOLUTION INTRAMUSCULAR; INTRAVENOUS at 14:55

## 2021-04-05 RX ADMIN — TRANEXAMIC ACID 1000 MG: 1 INJECTION, SOLUTION INTRAVENOUS at 14:23

## 2021-04-05 RX ADMIN — FAMOTIDINE 20 MG: 10 INJECTION INTRAVENOUS at 12:36

## 2021-04-05 RX ADMIN — MIDAZOLAM 0.5 MG: 1 INJECTION INTRAMUSCULAR; INTRAVENOUS at 12:35

## 2021-04-05 RX ADMIN — DEXAMETHASONE SODIUM PHOSPHATE 6 MG: 10 INJECTION INTRAMUSCULAR; INTRAVENOUS at 13:55

## 2021-04-05 RX ADMIN — SODIUM CHLORIDE, POTASSIUM CHLORIDE, SODIUM LACTATE AND CALCIUM CHLORIDE 9 ML/HR: 600; 310; 30; 20 INJECTION, SOLUTION INTRAVENOUS at 12:35

## 2021-04-05 RX ADMIN — HYDROMORPHONE HYDROCHLORIDE 0.5 MG: 1 INJECTION, SOLUTION INTRAMUSCULAR; INTRAVENOUS; SUBCUTANEOUS at 15:33

## 2021-04-05 RX ADMIN — ASPIRIN 325 MG: 325 TABLET, COATED ORAL at 20:38

## 2021-04-05 RX ADMIN — ACETAMINOPHEN 1000 MG: 500 TABLET, FILM COATED ORAL at 12:11

## 2021-04-05 RX ADMIN — ONDANSETRON 4 MG: 2 INJECTION INTRAMUSCULAR; INTRAVENOUS at 14:49

## 2021-04-05 RX ADMIN — NEOSTIGMINE METHYLSULFATE 3 MG: 0.5 INJECTION INTRAVENOUS at 15:16

## 2021-04-05 RX ADMIN — KETOROLAC TROMETHAMINE 15 MG: 30 INJECTION, SOLUTION INTRAMUSCULAR at 16:42

## 2021-04-05 RX ADMIN — GLYCOPYRROLATE 0.4 MG: 0.2 INJECTION INTRAMUSCULAR; INTRAVENOUS at 15:16

## 2021-04-05 RX ADMIN — PROPOFOL 150 MG: 10 INJECTION, EMULSION INTRAVENOUS at 13:49

## 2021-04-05 NOTE — PERIOPERATIVE NURSING NOTE
Questioned pt. Re. Water in cpap machine. She said that she did not empty it. Will check when she gets up to room.

## 2021-04-05 NOTE — ANESTHESIA PREPROCEDURE EVALUATION
Anesthesia Evaluation     history of anesthetic complications: PONV  NPO Solid Status: > 8 hours  NPO Liquid Status: > 2 hours           Airway   Mallampati: II  TM distance: <3 FB  Neck ROM: full  Possible difficult intubation and Anterior  Dental          Pulmonary - normal exam   (+) sleep apnea on CPAP,   Cardiovascular - normal exam        Neuro/Psych  (+) numbness,     GI/Hepatic/Renal/Endo    (+)  GERD,  liver disease, thyroid problem hypothyroidism    ROS Comment: Liver hemangioma     Musculoskeletal     (+) chronic pain, neck pain,   Abdominal    Substance History      OB/GYN          Other   arthritis,                      Anesthesia Plan    ASA 2     general     intravenous induction     Anesthetic plan, all risks, benefits, and alternatives have been provided, discussed and informed consent has been obtained with: patient.

## 2021-04-05 NOTE — ANESTHESIA POSTPROCEDURE EVALUATION
Patient: Oneida See    Procedure Summary     Date: 04/05/21 Room / Location: [facility] OR [address] [place] [address] MAIN OR    Anesthesia Start: 1345 Anesthesia Stop: 1527    Procedure: LEFT HIP ACETABULUM REVISION LEFT POSTERIOR APPROACH (Left Hip) Diagnosis:     Surgeons: Juanito Hollins MD Provider: Caden Lloyd MD    Anesthesia Type: general ASA Status: 2          Anesthesia Type: general    Vitals  Vitals Value Taken Time   /70 04/05/21 1615   Temp 36.6 °C (97.8 °F) 04/05/21 1524   Pulse 43 04/05/21 1626   Resp 16 04/05/21 1608   SpO2 100 % 04/05/21 1626   Vitals shown include unvalidated device data.        Post Anesthesia Care and Evaluation    Patient location during evaluation: bedside  Patient participation: complete - patient participated  Level of consciousness: awake  Pain management: adequate  Airway patency: patent  Anesthetic complications: No anesthetic complications  PONV Status: none  Cardiovascular status: acceptable  Respiratory status: acceptable  Hydration status: acceptable  Post Neuraxial Block status: Motor and sensory function returned to baseline  Comments: Sinus bradycardia noted. HD stable, asymptomatic.

## 2021-04-05 NOTE — OP NOTE
Orthopaedic Surgery   Revision left Total Hip  Dr. 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 Item Serial No.  Lot No. LRB No. Used Action   INSRT HIP OR30 2/MOBL XLPE SZ22/36 - [id number] Implant INSRT HIP OR30 2/MOBL XLPE SZ22/36  [employer] [id number] Left 1 Implanted   HD FEM ULTAMET/ART/MOD COCR  12/14 22MM PLS4 - [id number] Implant HD FEM ULTAMET/ART/MOD COCR  12/14 22MM PLS4  DEPUY [id number] Left 1 Implanted       Previously implanted implants: DePuy [place] #6 cementless femoral stem.  DePuy [place] 56 mm cup with a 36 mm inner diameter metal sleeve.  36 mm +5 chrome cobalt metal head    Revision implants: We left the femoral stem and the acetabular shell and sleeve.  I revised the chrome cobalt head to a dual mobility construct using a 22 mm +4 neck length DePuy chrome cobalt femoral head articulating inside of a [employer]hew Dual Mobility cross-linked polyethylene  36 mm head    Surgeon:  Juanito Hollins MD    Assistant:  I utilized service of an assistant surgeon, specifically Scarlet Lewis PA-C. Assistant surgeon participated in crucial portion of the operation. Use of the assistant surgeon greatly reduced overall operative time, thus significantly reducing overall morbidity for the patient.     Anesthetic Type:  General    Estimated Blood Loss:   100ml    Specimens: * No orders in the log *    Complications:  None    Tranexamic acid: Was given IV at the beginning of the case       Quality Measures:I have documented  the patient's current medications including dosage, frequency, and route of administration. We discussed conservative alternatives during the decision-making process prior to the procedure. Patient was evaluated immediately preoperatively for cardiovascular and thromboembolic risk factors.  There are no acute risk factors.. Prophylactic IV antibiotics were administered before the beginning of the case.       Operative indications:   This patient presented with a 2006 left total hip replacement DePuy metal-on-metal construct.  Her presenting symptoms were probably lumbar in origin.  However her work-up showed elevated metal ions.  After a discussion of options, we have decided to change her from a metal-on-metal bearing surface in this left hip to a polyethylene metal construct.  Note because she has a metal-on-metal hip on the right side this may not completely solve her elevated metal ion issue.    Procedure Performed:  Patient was turned with the  affected hip up in the lateral position. After a standard prep and drape posterolateral skin incision was carried out. The gluteus evgeny fibers were split in the line of their orientation. The hip was internally rotated. Sciatic nerve was protected. The superior portion of the external rotators along with the posterior, inferior and superior capsule was divided. The hip was dislocated posteriorly.     I examined the tissues.  There is minimal effusion present.  There was no metal stained tissue at all.  Her tissues looked healthy.  Because of exposure issues associated with her elevated body mass index, I decided to avoid the dissection which would have been required to remove the metal sleeve inside her shell.  Instead we decided to remove her 36 mm chrome cobalt head and go the route of a dual mobility construct.  This would solve the problem of a metal-on-metal bearing surface and help reduce her metal lines without the risks of an extensive dissection which would  have been required to expose her acetabulum and retract the femoral neck to remove the acetabular sleeve.  Thus we left the acetabulum intact including the inner sleeve inside the shell.  I did a trial reduction and ultimately we impacted the 36 mm Smith & Nephew dual mobility head with a 22 mm chrome cobalt DePuy head inside of it all onto the trunnion of the previously implanted [place] implant.  We used a +4 neck length.  This was very stable on testing.     The wound was closed in layers with #1 Vicryl in the capsule and external rotators, #1 Vicryl in the fascia, 2-0 Vicryl in subcutaneous tissues and staples in the skin all over an 1/8th inch drain. Note that I injected the capsule, external rotators, and the fascia with an Exparel solution containing 20 mL Exparel, 25 mL of 1/4% bupivacaine, and 20 mL saline. Care was taken to protect anterior neurovascular structures and sciatic nerve. Routine dressings applied.      Juanito Hollins MD  4/5/2021  15:00 EDT

## 2021-04-05 NOTE — PERIOPERATIVE NURSING NOTE
No P.O. x-ray ordered. Call to Dr. Hollins's cell phone. He does want one , but not necessary to wait for results. Can go to her room.

## 2021-04-05 NOTE — PERIOPERATIVE NURSING NOTE
Dr. Lloyd, AA, called to B/S re. Low H/R after narcs. Getting better. States OK to go to room. I will give toradol next.

## 2021-04-05 NOTE — PLAN OF CARE
Goal Outcome Evaluation:         Receieved from PACU @ 1730, L hip revision, was a slide from PACU stretcher to bed, ABD with paper tape CDI, drain patent, VSS, LR @ 100 cc/hr, DTV 2330, plan is for DC home tomorrow with HH, educated on the importance of CPAP usage related to hx of sleep apnea

## 2021-04-05 NOTE — ANESTHESIA PROCEDURE NOTES
Airway  Urgency: elective    Date/Time: 4/5/2021 1:51 PM  Difficult airway    General Information and Staff    Patient location during procedure: OR  CRNA: Irene Duarte CRNA    Indications and Patient Condition  Indications for airway management: airway protection    Preoxygenated: yes  Mask difficulty assessment: 1 - vent by mask    Final Airway Details  Final airway type: endotracheal airway      Successful airway: ETT  Cuffed: yes   Successful intubation technique: direct laryngoscopy  Facilitating devices/methods: anterior pressure/BURP and Bougie (eschmann used with success)  Endotracheal tube insertion site: oral  Blade: Francesco  Blade size: 3  ETT size (mm): 7.0  Cormack-Lehane Classification: grade IIb - view of arytenoids or posterior of glottis only (posterior glottis seen only with BURP, square pillow removed for better positioning.)  Placement verified by: chest auscultation and capnometry   Measured from: lips  ETT/EBT  to lips (cm): 21  Number of attempts at approach: 1  Assessment: lips, teeth, and gum same as pre-op and atraumatic intubation    Additional Comments   ett cuff up at MOP

## 2021-04-06 VITALS
HEART RATE: 84 BPM | HEIGHT: 66 IN | BODY MASS INDEX: 33.2 KG/M2 | WEIGHT: 206.57 LBS | OXYGEN SATURATION: 99 % | DIASTOLIC BLOOD PRESSURE: 74 MMHG | SYSTOLIC BLOOD PRESSURE: 120 MMHG | RESPIRATION RATE: 18 BRPM | TEMPERATURE: 98.6 F

## 2021-04-06 LAB
ANION GAP SERPL CALCULATED.3IONS-SCNC: 6.8 MMOL/L (ref 5–15)
BUN SERPL-MCNC: 13 MG/DL (ref 8–23)
BUN/CREAT SERPL: 20.3 (ref 7–25)
CALCIUM SPEC-SCNC: 8.5 MG/DL (ref 8.6–10.5)
CHLORIDE SERPL-SCNC: 108 MMOL/L (ref 98–107)
CO2 SERPL-SCNC: 26.2 MMOL/L (ref 22–29)
CREAT SERPL-MCNC: 0.64 MG/DL (ref 0.57–1)
GFR SERPL CREATININE-BSD FRML MDRD: 93 ML/MIN/1.73
GLUCOSE SERPL-MCNC: 106 MG/DL (ref 65–99)
HCT VFR BLD AUTO: 38.2 % (ref 34–46.6)
HGB BLD-MCNC: 12.9 G/DL (ref 12–15.9)
POTASSIUM SERPL-SCNC: 4.6 MMOL/L (ref 3.5–5.2)
SODIUM SERPL-SCNC: 141 MMOL/L (ref 136–145)

## 2021-04-06 PROCEDURE — 97110 THERAPEUTIC EXERCISES: CPT | Performed by: PHYSICAL THERAPIST

## 2021-04-06 PROCEDURE — 85014 HEMATOCRIT: CPT | Performed by: ORTHOPAEDIC SURGERY

## 2021-04-06 PROCEDURE — 85018 HEMOGLOBIN: CPT | Performed by: ORTHOPAEDIC SURGERY

## 2021-04-06 PROCEDURE — 25010000003 CEFAZOLIN IN DEXTROSE 2-4 GM/100ML-% SOLUTION: Performed by: ORTHOPAEDIC SURGERY

## 2021-04-06 PROCEDURE — 80048 BASIC METABOLIC PNL TOTAL CA: CPT | Performed by: ORTHOPAEDIC SURGERY

## 2021-04-06 PROCEDURE — 97116 GAIT TRAINING THERAPY: CPT | Performed by: PHYSICAL THERAPIST

## 2021-04-06 PROCEDURE — 25010000002 KETOROLAC TROMETHAMINE PER 15 MG: Performed by: ORTHOPAEDIC SURGERY

## 2021-04-06 PROCEDURE — 97161 PT EVAL LOW COMPLEX 20 MIN: CPT | Performed by: PHYSICAL THERAPIST

## 2021-04-06 PROCEDURE — 63710000001 ONDANSETRON PER 8 MG: Performed by: ORTHOPAEDIC SURGERY

## 2021-04-06 RX ORDER — HYDROCODONE BITARTRATE AND ACETAMINOPHEN 7.5; 325 MG/1; MG/1
1-2 TABLET ORAL EVERY 6 HOURS PRN
Qty: 50 TABLET | Refills: 0 | Status: SHIPPED | OUTPATIENT
Start: 2021-04-06 | End: 2021-06-24

## 2021-04-06 RX ORDER — ASPIRIN 325 MG
325 TABLET, DELAYED RELEASE (ENTERIC COATED) ORAL DAILY
Qty: 42 TABLET | Refills: 0 | Status: SHIPPED | OUTPATIENT
Start: 2021-04-06 | End: 2021-05-18

## 2021-04-06 RX ORDER — HYDROCODONE BITARTRATE AND ACETAMINOPHEN 7.5; 325 MG/1; MG/1
1 TABLET ORAL EVERY 6 HOURS PRN
Status: DISCONTINUED | OUTPATIENT
Start: 2021-04-06 | End: 2021-04-06 | Stop reason: HOSPADM

## 2021-04-06 RX ORDER — ONDANSETRON 4 MG/1
4 TABLET, FILM COATED ORAL EVERY 6 HOURS PRN
Qty: 25 TABLET | Refills: 1 | Status: SHIPPED | OUTPATIENT
Start: 2021-04-06 | End: 2021-06-24

## 2021-04-06 RX ADMIN — SODIUM CHLORIDE, POTASSIUM CHLORIDE, SODIUM LACTATE AND CALCIUM CHLORIDE 100 ML/HR: 600; 310; 30; 20 INJECTION, SOLUTION INTRAVENOUS at 03:24

## 2021-04-06 RX ADMIN — HYDROCODONE BITARTRATE AND ACETAMINOPHEN 1 TABLET: 7.5; 325 TABLET ORAL at 15:12

## 2021-04-06 RX ADMIN — HYDROCODONE BITARTRATE AND ACETAMINOPHEN 1 TABLET: 7.5; 325 TABLET ORAL at 09:17

## 2021-04-06 RX ADMIN — ONDANSETRON HYDROCHLORIDE 4 MG: 4 TABLET, FILM COATED ORAL at 14:47

## 2021-04-06 RX ADMIN — KETOROLAC TROMETHAMINE 15 MG: 30 INJECTION, SOLUTION INTRAMUSCULAR at 15:46

## 2021-04-06 RX ADMIN — ONDANSETRON HYDROCHLORIDE 4 MG: 4 TABLET, FILM COATED ORAL at 08:51

## 2021-04-06 RX ADMIN — CEFAZOLIN SODIUM 2 G: 2 INJECTION, SOLUTION INTRAVENOUS at 06:07

## 2021-04-06 RX ADMIN — FERROUS SULFATE TAB 325 MG (65 MG ELEMENTAL FE) 325 MG: 325 (65 FE) TAB at 08:43

## 2021-04-06 RX ADMIN — ASPIRIN 325 MG: 325 TABLET, COATED ORAL at 08:43

## 2021-04-06 NOTE — PLAN OF CARE
Goal Outcome Evaluation:         POD #1 Lhip revision, dressing changed, drain dc'd, voiding without difficulty, cleared by PT, plan is for home with HH, meds to bed, educated on the importance of CPAP use related to sleep apnea

## 2021-04-06 NOTE — PLAN OF CARE
Goal Outcome Evaluation:  Plan of Care Reviewed With: patient, son     Outcome Summary: Pt is s/p L REYNALDO revision. She reports little to no pain, but requires assist for hip abduction exercises. She completed all other exercises without assistance. She is ambualting well with Rwx and navigated stairs well. Pt plans to d/c to her mothers house for a few days before returning home with her son. Equipment was ordered and PT will sign off.

## 2021-04-06 NOTE — PROGRESS NOTES
Discharge Planning Assessment  [facility]     Patient Name: Oneida See  MRN: [id number]  Today's Date: 4/6/2021    Admit Date: 4/5/2021    Discharge Needs Assessment     Row Name 04/06/21 1519       Living Environment    Lives With  spouse    Current Living Arrangements  home/apartment/condo    Primary Care Provided by  self    Provides Primary Care For  no one    Family Caregiver if Needed  child(lakeisha), adult;spouse    Quality of Family Relationships  helpful;involved;supportive    Able to Return to Prior Arrangements  yes       Resource/Environmental Concerns    Transportation Concerns  car, none       Transition Planning    Patient/Family Anticipates Transition to  home with help/services    Patient/Family Anticipated Services at Transition      Transportation Anticipated  family or friend will provide       Discharge Needs Assessment    Readmission Within the Last 30 Days  no previous admission in last 30 days    Equipment Currently Used at Home  none    Discharge Facility/Level of Care Needs  home with home health    Provided Post Acute Provider List?  Yes    Post Acute Provider List  Home Health    Patient's Choice of Community Agency(s)  [facility].        Discharge Plan     Row Name 04/06/21 1519       Plan    Plan  Home with family support & [facility].    Patient/Family in Agreement with Plan  yes    Plan Comments  Spoke with the patient, verified current information and CCP role explained. Patient said she lives with her spouse and has very good family support. She's IADL and has no history with DME/HH. She has been to [facility] in 2006. She plans to d/c to her mother's house with family support. She said her children with assist her as needed after d/c. She also plans to work with  PT. She requests a referral to [facility]. Referral sent in Epic. Spoke with Emily/[facility] who will look into the patient's case. Patient denies needs for DME/RH at this time. She  also said she plans for her son/Baltazar to transport her home by car at d/c. CM letter given. No other needs identified.        Continued Care and Services - Admitted Since 4/5/2021     Home Medical Care     Service Provider Request Status Selected Services Address Phone Fax Patient Preferred    [facility]  Pending - Request Sent N/A [id number] [address] [phone] --              Expected Discharge Date and Time     Expected Discharge Date Expected Discharge Time    Apr 6, 2021         Demographic Summary     Row Name 04/06/21 1518       General Information    Reason for Consult  discharge planning    Preferred Language  English     Used During This Interaction  no       Contact Information    Permission Granted to Share Info With  ;family/designee        Functional Status     Row Name 04/06/21 1518       Functional Status    Usual Activity Tolerance  good    Current Activity Tolerance  good       Functional Status, IADL    Medications  independent    Meal Preparation  independent    Housekeeping  independent    Laundry  independent    Shopping  independent       Mental Status Summary    Recent Changes in Mental Status/Cognitive Functioning  no changes        Psychosocial     Row Name 04/06/21 1519       Intellectual Performance WDL    Level of Consciousness  Alert       Coping/Stress    Patient Personal Strengths  able to adapt    Sources of Support  adult child(lakeisha)    Reaction to Health Status  accepting    Understanding of Condition and Treatment  adequate understanding of medical condition       Developmental Stage (Eriksson's)    Developmental Stage  Stage 8 (65 years-death/Late Adulthood) Integrity vs. Despair        Abuse/Neglect    No documentation.       Legal    No documentation.       Substance Abuse    No documentation.       Patient Forms    No documentation.           Marissa Sanford RN

## 2021-04-06 NOTE — PLAN OF CARE
Goal Outcome Evaluation:  Plan of Care Reviewed With: patient  Progress: improving  Outcome Summary: POD1 today. Pain controlled with pain med. VSS on 2L02 while sleeping. Could not tolerate personal Cpap overnight Dsg C/D/I.Hemovac drain draining. Educated on the importance of wearing Cpap machine when sleeping. currentlt resting in bed. Will continue to monitor.  Problem: Adult Inpatient Plan of Care  Goal: Plan of Care Review  Outcome: Ongoing, Progressing  Flowsheets (Taken 4/6/2021 0344)  Progress: improving  Plan of Care Reviewed With: patient  Outcome Summary: POD1 today. Pain controlled with pain med. VSS on 2L02 while sleeping. Could not tolerate personal Cpap overnight Dsg C/D/I.Hemovac drain draining. Educated on the importance of wearing Cpap machine when sleeping. currentlt resting in bed. Will continue to monitor.

## 2021-04-06 NOTE — DISCHARGE INSTR - ACTIVITY
Total Hip Replacement Discharge Instructions:     I. ACTIVITIES:     1. Exercises:  · Complete exercise program as taught by the hospital physical therapist 2 times per day  · Exercise program will be advanced by the physical therapist  · During the day be up ambulating every 2 hours (while awake) for short distances  · Complete the ankle pump exercises at least 10 times per hour (while awake)  · Elevate legs when in bed and for at least 30 minutes during the day.Use cold packs 20-30 minutes approximately 5 times per day. This should be done before and after completing your exercises and at any time you are experiencing pain/ stiffness in your operative extremity.     2. Activities of Daily Living:  · No tub baths, hot tubs, or swimming pools for 4 weeks  · May shower and let water run over the incision on post-operative day #5 if no drainage. Do not scrub or rub the incision. Simply let the water run over the incision and pat dry.     II. Precautions:  · Everyone that comes near you should wash their hands  · No elective dental, genital-urinary, or colon procedures or surgical procedures for 12 weeks after surgery unless absolutely necessary.  · If dental work or surgical procedure is deemed absolutely necessary during the first 12 weeks, you will need to contact your surgeon as you will need to take antibiotics 1 hour prior to any dental work (including teeth cleanings).Please discuss with your surgeon prophylactic antibiotics as the length of time this intervention will be necessary for you varies with each patient’s health history and situation.  · Avoid sick people. If you must be around someone who is ill, they should wear a mask.  · Avoid visits to the Emergency Room or Urgent Care unless you are having a life threatening event.   · If ordered stockings are to be placed on in the morning and removed at night. Monitor the stockings to ensure that any swelling is not causing the stockings to become too tight.  In this case, remove stockings immediately.     III. INCISION CARE:     · Wash your hands prior to dressing changes  · Change the dressing as needed to keep incision clean and dry. Utilize dry gauze and paper tape. Avoid touching the side of the gauze that goes against the incision with your hands.  · No creams or ointments to the incision  · May remove dressing once the incision is free of drainage  · Do not touch or pick at the incision  · Check incision every day and notify surgeon immediately if any of the following signs or symptoms are noted:  ? Increase in redness  ? Increase in swelling around the incision and of the entire extremity  ? Increase in pain  ? Drainage oozing from the incision  ? Pulling apart of the edges of the incision  ? Increase in overall body temperature (greater than 100.5 degrees)  · Your surgeon will instruct you regarding suture or staple removal     IV. Medications:      1. Anticoagulants: You will be discharged on an anticoagulant. This is a prophylactic medication that helps prevent blood clots during your post-operative period. The type and length of dosage varies based on your individual needs, procedure performed, and surgeon’s preference.  · While taking the anticoagulant, you should avoid taking any additional aspirin, ibuprofen (Advil or Motrin), Aleve (Naprosyn) or other non-steroidal anti-inflammatory medications.   · Notify surgeon immediately if any esequiel bleeding is noted in the urine, stool, emesis, or from the nose or the incision. Blood in the stool will often appear as black rather than red. Blood in urine may appear as pink. Blood in emesis may appear as brown/black like coffee grounds.  · You will need to apply pressure for longer periods of time to any cuts or abrasions to stop bleeding  · Avoid alcohol while taking anticoagulants     2. Stool Softeners: You will be at greater risk of constipation after surgery due to being less mobile and the pain medications.    · Take stool softeners as instructed by your surgeon while on pain medications. Bran cereal is most effective. Over the counter Colace 100 mg 1-2 capsules twice daily.   · If stools become too loose or too frequent, please decreases the dosage or stop the stool softener.  · If constipation occurs despite use of stool softeners, you are to continue the stool softeners and add a laxative (Milk of Magnesia 1 ounce daily as needed)  · Drink plenty of fluids, and eat fruits and vegetables during your recovery time     3. Pain Medications utilized after surgery are narcotics and the law requires that the following information be given to all patients that are prescribed narcotics:  · CLASSIFICATION: Pain medications are called Opioids and are narcotics  · LEGALITIES: It is illegal to share narcotics with others and to drive within 24 hours of taking narcotics  · POTENTIAL SIDE EFFECTS: Potential side effects of opioids include: nausea, vomiting, itching, dizziness, drowsiness, dry mouth, constipation, and difficulty urinating.  · POTENTIAL ADVERSE EFFECTS:   ? Opioid tolerance can develop with use of pain medications and this simply means that it requires more and more of the medication to control pain; however, this is seen more in patients that use opioids for longer periods of time.  ? Opioid dependence can develop with use of Opioids and this simply means that to stop the medication can cause withdrawal symptoms; however, this is seen with patients that use Opioids for longer periods of time.  ? Opioid addiction can develop with use of Opioids and the incidence of this is very unlikely in patients who take the medications as ordered and stop the medications as instructed.  ? Opioid overdose can be dangerous, but is unlikely when the medication is taken as ordered and stopped when ordered. It is important not to mix opioids with alcohol or with and type of sedative such as Benadryl as this can lead to over sedation  and respiratory difficulty.  · DOSAGE:   ? Pain medications will need to be taken consistently for the first week to decrease pain and promote adequate pain relief and participation in physical therapy.  ? After the initial surgical pain begins to resolve, you may begin to decrease the pain medication. By the end of 6-8 weeks, you should be off of pain medications.  ? Refills will not be given by the office during evening hours, on weekends, or after 6-8 weeks post-op.  ? To seek refills on pain medications during the initial 6 week post-operative period, you must call the office 48 hours in advance to request the refill. The office will then notify you when to  the prescription. DO NOT wait until you are out of the medication to request a refill.     V. FOLLOW-UP VISITS:  · You will need to follow up in the office with your surgeon in 3 weeks. Please call this number [phone]  to schedule this appointment.  If you have any concerns or suspected complications prior to your follow up visit, please call your surgeons office. Do not wait until your appointment time if you suspect complications. These will need to be addressed in the office promptly.

## 2021-04-06 NOTE — DISCHARGE SUMMARY
Discharge Summary   Juanito Hollins M.D.    NAME: Oneida See ADMIT: 2021   : 1955  PCP: Rosalee Sandy MD    MRN: [id number] LOS: 0 days   AGE/SEX: 66 y.o. 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  female who underwent successful revision left Total Hip Arthroplasty on 2021.  Oneida See was started on Aspirin 325mg po daily immediately post-operatively for DVT prophylaxis.  On post-op day 1 the patients dressing was changed and their incision was clean, with no signs of infection and their calf was soft, with no signs of DVT.  The patient progressed well with physical therapy and the patients hemoglobin remained stable. On post-operative day 1 the patient was felt ready for discharge.      Total Hip Replacement Discharge Instructions:    I. ACTIVITIES:    1. Exercises:  ? Complete exercise program as taught by the hospital physical therapist 2 times per day  ? Exercise program will be advanced by the physical therapist  ? During the day be up ambulating every 2 hours (while awake) for short distances  ? Complete the ankle pump exercises at least 10 times per hour (while awake)  ? Elevate legs when in bed and for at least 30 minutes during the day.Use cold packs 20-30 minutes approximately 5 times per day. This should be done before and after completing your exercises and at any time you are experiencing pain/ stiffness in your operative extremity.    2. Activities of Daily Living:  ? No tub baths, hot tubs, or swimming pools for 4 weeks  ? May shower and let water run over the incision on post-operative day #5 if no drainage. Do not scrub or rub the incision. Simply let the water run over the incision and pat  dry.    II. Precautions:  ? Everyone that comes near you should wash their hands  ? No elective dental, genital-urinary, or colon procedures or surgical procedures for 12 weeks after surgery unless absolutely necessary.  ? If dental work or surgical procedure is deemed absolutely necessary during the first 12 weeks, you will need to contact your surgeon as you will need to take antibiotics 1 hour prior to any dental work (including teeth cleanings).Please discuss with your surgeon prophylactic antibiotics as the length of time this intervention will be necessary for you varies with each patient’s health history and situation.  ? Avoid sick people. If you must be around someone who is ill, they should wear a mask.  ? Avoid visits to the Emergency Room or Urgent Care unless you are having a life threatening event.   ? If ordered stockings are to be placed on in the morning and removed at night. Monitor the stockings to ensure that any swelling is not causing the stockings to become too tight. In this case, remove stockings immediately.    III. INCISION CARE:    ? Wash your hands prior to dressing changes  ? Change the dressing as needed to keep incision clean and dry. Utilize dry gauze and paper tape. Avoid touching the side of the gauze that goes against the incision with your hands.  ? No creams or ointments to the incision  ? May remove dressing once the incision is free of drainage  ? Do not touch or pick at the incision  ? Check incision every day and notify surgeon immediately if any of the following signs or symptoms are noted:  o Increase in redness  o Increase in swelling around the incision and of the entire extremity  o Increase in pain  o Drainage oozing from the incision  o Pulling apart of the edges of the incision  o Increase in overall body temperature (greater than 100.5 degrees)  ? Your surgeon will instruct you regarding suture or staple removal    IV. Medications:     1. Anticoagulants: You will be  discharged on an anticoagulant. This is a prophylactic medication that helps prevent blood clots during your post-operative period. The type and length of dosage varies based on your individual needs, procedure performed, and surgeon’s preference.  ? While taking the anticoagulant, you should avoid taking any additional aspirin, ibuprofen (Advil or Motrin), Aleve (Naprosyn) or other non-steroidal anti-inflammatory medications.   ? Notify surgeon immediately if any esequiel bleeding is noted in the urine, stool, emesis, or from the nose or the incision. Blood in the stool will often appear as black rather than red. Blood in urine may appear as pink. Blood in emesis may appear as brown/black like coffee grounds.  ? You will need to apply pressure for longer periods of time to any cuts or abrasions to stop bleeding  ? Avoid alcohol while taking anticoagulants    2. Stool Softeners: You will be at greater risk of constipation after surgery due to being less mobile and the pain medications.   ? Take stool softeners as instructed by your surgeon while on pain medications. Bran cereal is most effective. Over the counter Colace 100 mg 1-2 capsules twice daily.   ? If stools become too loose or too frequent, please decreases the dosage or stop the stool softener.  ? If constipation occurs despite use of stool softeners, you are to continue the stool softeners and add a laxative (Milk of Magnesia 1 ounce daily as needed)  ? Drink plenty of fluids, and eat fruits and vegetables during your recovery time    3. Pain Medications utilized after surgery are narcotics and the law requires that the following information be given to all patients that are prescribed narcotics:  ? CLASSIFICATION: Pain medications are called Opioids and are narcotics  ? LEGALITIES: It is illegal to share narcotics with others and to drive within 24 hours of taking narcotics  ? POTENTIAL SIDE EFFECTS: Potential side effects of opioids include: nausea,  vomiting, itching, dizziness, drowsiness, dry mouth, constipation, and difficulty urinating.  ? POTENTIAL ADVERSE EFFECTS:   o Opioid tolerance can develop with use of pain medications and this simply means that it requires more and more of the medication to control pain; however, this is seen more in patients that use opioids for longer periods of time.  o Opioid dependence can develop with use of Opioids and this simply means that to stop the medication can cause withdrawal symptoms; however, this is seen with patients that use Opioids for longer periods of time.  o Opioid addiction can develop with use of Opioids and the incidence of this is very unlikely in patients who take the medications as ordered and stop the medications as instructed.  o Opioid overdose can be dangerous, but is unlikely when the medication is taken as ordered and stopped when ordered. It is important not to mix opioids with alcohol or with and type of sedative such as Benadryl as this can lead to over sedation and respiratory difficulty.  ? DOSAGE:   o Pain medications will need to be taken consistently for the first week to decrease pain and promote adequate pain relief and participation in physical therapy.  o After the initial surgical pain begins to resolve, you may begin to decrease the pain medication. By the end of 6-8 weeks, you should be off of pain medications.  o Refills will not be given by the office during evening hours, on weekends, or after 6-8 weeks post-op.  o To seek refills on pain medications during the initial 6 week post-operative period, you must call the office 48 hours in advance to request the refill. The office will then notify you when to  the prescription. DO NOT wait until you are out of the medication to request a refill.    V. FOLLOW-UP VISITS:  ? You will need to follow up in the office with your surgeon in 3 weeks. Please call this number [phone]  to schedule this appointment.  If you have any  concerns or suspected complications prior to your follow up visit, please call your surgeons office. Do not wait until your appointment time if you suspect complications. These will need to be addressed in the office promptly.    Final diagnosis:  1.  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  She underwent revision surgery to convert her to a polyethylene /metal bearing construct.  2.  She has a metal-on-metal right total hip replacement that is seemingly functioning well  3.  Sleep apnea    Discharge Medications:    1) hydrocodone 7.5/325 1-2 po q 4-6 hours prn pain  2)  Enteric Coated Aspirin 325 mg po daily for 6 weeks.      Juanito Hollins MD  4/6/2021  07:22 EDT

## 2021-04-06 NOTE — THERAPY EVALUATION
Patient Name: Oneida See  : 1955    MRN: [id number]                              Today's Date: 2021       Admit Date: 2021    Visit Dx:     ICD-10-CM ICD-9-CM   1. Primary osteoarthritis of left hip  M16.12 715.15     Patient Active Problem List   Diagnosis   • Adult hypothyroidism   • LBP (low back pain)   • Chronic neck pain   • Obstructive apnea   • Avitaminosis D   • Health care maintenance   • Colon polyp   • Gastroesophageal reflux disease without esophagitis   • Thoracic radiculopathy   • Slow transit constipation   • Microscopic hematuria   • Chest wall pain   • Degenerative joint disease (DJD) of hip   • DJD (degenerative joint disease)     Past Medical History:   Diagnosis Date   • Arthritis    • Contact dermatitis and eczema due to plant     Poison ivy   • Disease of thyroid gland    • Floaters in visual field, bilateral    • H/O degenerative disc disease    • Hemangioma of liver     CT 2017   • Hip pain, chronic, left    • History of concussion    • Human papilloma virus (HPV) infection 2013    Overview:  2013 pap negative, HPV positive, 16/18 negative 10/2014 pap negative and HPV negative   • Lower back pain    • Memory change    • Multiple polyps of sigmoid colon    • Neck pain    • PONV (postoperative nausea and vomiting)    • Sleep apnea     cpap     Past Surgical History:   Procedure Laterality Date   •  SECTION      Dr Mishra   • COLONOSCOPY     • COLONOSCOPY W/ POLYPECTOMY  2016    : 1 polyp   • OOPHORECTOMY Bilateral      Dr Meraz  uterus intact   • TOTAL HIP ARTHROPLASTY      Right  Dr Hollins Left  Dr Hollins   • TOTAL HIP ARTHROPLASTY REVISION Left 2021    Procedure: LEFT HIP ACETABULUM REVISION LEFT POSTERIOR APPROACH;  Surgeon: Juanito Hollins MD;  Location: [facility];  Service: Orthopedics;  Laterality: Left;     General Information     Row Name 21 0957          Physical Therapy Time and Intention    Document  Type  evaluation  -     Mode of Treatment  individual therapy;physical therapy  -     Row Name 04/06/21 1245 04/06/21 0957       General Information    Prior Level of Function  --  independent:  -KH    Existing Precautions/Restrictions  fall;hip, posterior  -  --    Barriers to Rehab  none identified  -  --    Row Name 04/06/21 1245 04/06/21 0957       Living Environment    Lives With  child(lakeisha), adult;parent(s)  -  child(lakesiha), adult  -    Row Name 04/06/21 1245          Home Main Entrance    Number of Stairs, Main Entrance  none  -     Row Name 04/06/21 1245 04/06/21 0957       Cognition    Orientation Status (Cognition)  oriented x 4  -KH  oriented x 4  -KH      User Key  (r) = Recorded By, (t) = Taken By, (c) = Cosigned By    Initials Name Provider Type     Shira Anderson, PT Physical Therapist        Mobility     Row Name 04/06/21 1000          Bed Mobility    Bed Mobility  bed mobility (all) activities  -     All Activities, [place] (Bed Mobility)  standby assist  -     Assistive Device (Bed Mobility)  bed rails  -[facility] Name 04/06/21 1000          Sit-Stand Transfer    Sit-Stand [place] (Transfers)  standby assist  -     Assistive Device (Sit-Stand Transfers)  walker, front-wheeled  -[facility] Name 04/06/21 1000          Gait/Stairs (Locomotion)    [place] Level (Gait)  standby assist  -     Assistive Device (Gait)  walker, front-wheeled  -     Distance in Feet (Gait)  180  -KH     Deviations/Abnormal Patterns (Gait)  antalgic;gait speed decreased  -     [place] Level (Stairs)  contact guard  -     Number of Steps (Stairs)  4  -KH     Ascending Technique (Stairs)  step-to-step  -     Descending Technique (Stairs)  step-to-step  -[facility] Name 04/06/21 1000          Mobility    Extremity Weight-bearing Status  left lower extremity  -     Left Lower Extremity (Weight-bearing Status)  weight-bearing as tolerated (WBAT)  -       User Key  (r)  = Recorded By, (t) = Taken By, (c) = Cosigned By    Initials Name Provider Type    Shira Henderson, SAVANNAH Physical Therapist        Obj/Interventions     [facility] Name 04/06/21 1247          Range of Motion Comprehensive    Comment, General Range of Motion  L hip limited by pain  -KH     Row Name 04/06/21 1247          Strength Comprehensive (MMT)    Comment, General Manual Muscle Testing (MMT) Assessment  BLE WFL  -KH     Row Name 04/06/21 1247          Motor Skills    Therapeutic Exercise  hip L REYNALDO protocol x 10 reps  -       User Key  (r) = Recorded By, (t) = Taken By, (c) = Cosigned By    Initials Name Provider Type    Shira Henderson, SAVANNAH Physical Therapist        Goals/Plan    No documentation.       Clinical Impression     Row Name 04/06/21 1248          Pain    Additional Documentation  Pain Scale: Numbers Pre/Post-Treatment (Group)  -KH     Row Name 04/06/21 1248          Pain Scale: Numbers Pre/Post-Treatment    Pretreatment Pain Rating  0/10 - no pain  -     Posttreatment Pain Rating  0/10 - no pain  -KH     Row Name 04/06/21 1248          Plan of Care Review    Plan of Care Reviewed With  patient;son  -     Outcome Summary  Pt is s/p L REYNALDO revision. She reports little to no pain, but requires assist for hip abduction exercises. She completed all other exercises without assistance. She is ambualting well with Rwx and navigated stairs well. Pt plans to d/c to her mothers house for a few days before returning home with her son. Equipment was ordered and PT will sign off.  -[facility] Name 04/06/21 1248          Therapy Assessment/Plan (PT)    Patient/Family Therapy Goals Statement (PT)  home home with  PT  -KH     Row Name 04/06/21 1248          Positioning and Restraints    Pre-Treatment Position  in bed  -     Post Treatment Position  chair  -     In Chair  reclined;call light within reach;encouraged to call for assist;exit alarm on;with family/caregiver  -       User Key  (r)  = Recorded By, (t) = Taken By, (c) = Cosigned By    Initials Name Provider Type    Shira Henderson PT Physical Therapist        Outcome Measures     Row Name 04/06/21 1257          How much help from another person do you currently need...    Turning from your back to your side while in flat bed without using bedrails?  4  -KH     Moving from lying on back to sitting on the side of a flat bed without bedrails?  4  -KH     Moving to and from a bed to a chair (including a wheelchair)?  3  -KH     Standing up from a chair using your arms (e.g., wheelchair, bedside chair)?  3  -KH     Climbing 3-5 steps with a railing?  3  -KH     To walk in hospital room?  3  -KH     AM-PAC 6 Clicks Score (PT)  20  -KH     Row Name 04/06/21 1257          Functional Assessment    Outcome Measure Options  AM-PAC 6 Clicks Basic Mobility (PT)  -       User Key  (r) = Recorded By, (t) = Taken By, (c) = Cosigned By    Initials Name Provider Type    Shira Henderson PT Physical Therapist          PT Recommendation and Plan     Plan of Care Reviewed With: patient, son  Outcome Summary: Pt is s/p L REYNALDO revision. She reports little to no pain, but requires assist for hip abduction exercises. She completed all other exercises without assistance. She is ambualting well with Rwx and navigated stairs well. Pt plans to d/c to her mothers house for a few days before returning home with her son. Equipment was ordered and PT will sign off.     Time Calculation:   PT Charges     Row Name 04/06/21 1258             Time Calculation    Start Time  0950  -      Stop Time  1034  -      Time Calculation (min)  44 min  -         Time Calculation- PT    Total Timed Code Minutes- PT  30 minute(s)  -        User Key  (r) = Recorded By, (t) = Taken By, (c) = Cosigned By    Initials Name Provider Type    Shira Henderson PT Physical Therapist        Therapy Charges for Today     Code Description Service Date Service  Provider Modifiers Qty    61246036099 HC PT EVAL LOW COMPLEXITY 1 4/6/2021 Shira Anderson, PT GP 1    86170772328 HC GAIT TRAINING EA 15 MIN 4/6/2021 Shira Anderson, PT GP 1    69424967007 HC PT THER PROC EA 15 MIN 4/6/2021 Shira Anderson, PT GP 1          PT G-Codes  Outcome Measure Options: AM-PAC 6 Clicks Basic Mobility (PT)  AM-PAC 6 Clicks Score (PT): 20    Shira Anderson, PT  4/6/2021

## 2021-04-06 NOTE — PROGRESS NOTES
"  Orthopaedic Surgery   Daily Progress Note  Dr. Juanito Hollins   [phone]  DEMOGRAPHICS:   · Oneida See   · Age:66 y.o.   · MRN:[id number]  · Admitted: 4/5/2021    PROCEDURE: 1 Day Post-Op s/p Procedure(s):  LEFT HIP ACETABULUM REVISION LEFT POSTERIOR APPROACH     /74 (BP Location: Left arm, Patient Position: Lying)   Pulse 84   Temp 98.6 °F (37 °C) (Skin)   Resp 18   Ht 167.6 cm (66\")   Wt 93.7 kg (206 lb 9.1 oz)   SpO2 99%   BMI 33.34 kg/m²     Lab Results (last 24 hours)     Procedure Component Value Units Date/Time    Basic Metabolic Panel [[id number]]  (Abnormal) Collected: 04/06/21 0511    Specimen: Blood Updated: 04/06/21 0633     Glucose 106 mg/dL      BUN 13 mg/dL      Creatinine 0.64 mg/dL      Sodium 141 mmol/L      Potassium 4.6 mmol/L      Chloride 108 mmol/L      CO2 26.2 mmol/L      Calcium 8.5 mg/dL      eGFR Non African Amer 93 mL/min/1.73      BUN/Creatinine Ratio 20.3     Anion Gap 6.8 mmol/L     Narrative:      GFR Normal >60  Chronic Kidney Disease <60  Kidney Failure <15      Hemoglobin & Hematocrit, Blood [[id number]]  (Normal) Collected: 04/06/21 0511    Specimen: Blood Updated: 04/06/21 0620     Hemoglobin 12.9 g/dL      Hematocrit 38.2 %           Imaging Results (Last 24 Hours)     Procedure Component Value Units Date/Time    XR Hip 1 View Without Pelvis Left (Surgery Only) [[id number]] Collected: 04/05/21 1704     Updated: 04/05/21 1708    Narrative:      XR HIP 1 VIEW WO PELVIS LEFT-  04/05/2021     HISTORY: Postop left hip revision.     The acetabular and proximal femoral components of the left hip  prosthesis are well-seated with no abnormal surrounding bony lucencies.  Tiny amount of soft tissue air is seen. Small-caliber drainage catheter  and skin staples are seen.     No unexpected findings are noted.       Impression:      Satisfactory postoperative appearance of the left hip.     This report was finalized on 4/5/2021 5:05 PM by Dr. Krishna Shankar M.D.    "          Patient Care Team:  Rosalee Sandy MD as PCP - General (Family Medicine)    SUBJECTIVE  Comfortable    PHYSICAL EXAM  Neurovascular intact     ASSESSMENT: Patient is a 66 y.o. female who is 1 Day Post-Op s/p Procedure(s):  LEFT HIP ACETABULUM REVISION LEFT POSTERIOR APPROACH     PLAN / DISPOSITION:  Aspirin for DVT prevention  Discharge today    Juanito Hollins Sr, MD  Orthopaedic Surgery  [facility]  [phone]

## 2021-04-07 NOTE — PROGRESS NOTES
Case Management Discharge Note      Final Note: pt d/c'ed home with [facility]    Provided Post Acute Provider List?: Yes  Post Acute Provider List: Home Health    Selected Continued Care - Discharged on 4/6/2021 Admission date: 4/5/2021 - Discharge disposition: Home or Self Care    Destination    No services have been selected for the patient.              Durable Medical Equipment    No services have been selected for the patient.              Dialysis/Infusion    No services have been selected for the patient.              Home Medical Care Coordination complete    Service Provider Selected Services Address Phone Fax Patient Preferred    [facility] [place]  [facility] [address] [address] [phone] --          Therapy    No services have been selected for the patient.              Community Resources    No services have been selected for the patient.                       Final Discharge Disposition Code: 06 - home with home health care

## 2021-04-08 ENCOUNTER — NURSE TRIAGE (OUTPATIENT)
Dept: CALL CENTER | Facility: HOSPITAL | Age: 66
End: 2021-04-08

## 2021-04-09 NOTE — TELEPHONE ENCOUNTER
Reason for Disposition  • Treating constipation with Over-The-Counter (OTC) medicines, questions about    Additional Information  • Negative: [1] Abdomen pain is main symptom AND [2] male  • Negative: [1] Abdomen pain is main symptom AND [2] adult female  • Negative: Rectal bleeding or blood in stool is main symptom  • Negative: Rectal pain or itching is main symptom  • Negative: Constipation in a cancer patient who is currently (or recently) receiving chemotherapy or radiation therapy, or cancer patient who has metastatic or end-stage cancer and is receiving palliative care  • Negative: Patient sounds very sick or weak to the triager  • Negative: [1] Vomiting AND [2] abdomen looks much more swollen than usual  • Negative: [1] Vomiting AND [2] contains bile (green color)  • Negative: [1] Constant abdominal pain AND [2] present > 2 hours  • Negative: [1] Rectal pain or fullness from fecal impaction (rectum full of stool) AND [2] NOT better after SITZ bath, suppository or enema  • Negative: [1] Intermittent mild abdominal pain AND [2] fever  • Negative: Abdomen is more swollen than usual  • Negative: Last bowel movement (BM) > 4 days ago  • Negative: Leaking stool  • Negative: Unable to have a bowel movement (BM) without manually removing stool (using finger to pull out stool or perform disimpaction)  • Negative: Unable to have a bowel movement (BM) without laxative or enema  • Negative: [1] Constipation persists > 1 week AND [2] no improvement after using CARE ADVICE  • Negative: [1] Weight loss > 10 pounds (5 kg) AND [2] not dieting  • Negative: Pencil-like, narrow stools  • Negative: Taking new prescription medication  • Negative: [1] Minor bleeding from rectum (e.g., blood just on toilet paper, few drops, streaks on surface of normal formed BM) AND [2] 3 or more times  • Negative: [1] Uses laxative (e.g., PEG / Miralax. Milk of magnesia) or enema AND [2] > once a month  • Negative: Constipation is a chronic  "symptom (recurrent or ongoing AND present > 4 weeks)  • Negative: Mild constipation    Answer Assessment - Initial Assessment Questions  1. STOOL PATTERN OR FREQUENCY: \"How often do you pass bowel movements (BMs)?\"  (Normal range: tid to q 3 days)  \"When was the last BM passed?\"        daily  2. STRAINING: \"Do you have to strain to have a BM?\"       yes  3. RECTAL PAIN: \"Does your rectum hurt when the stool comes out?\" If so, ask: \"Do you have hemorrhoids? How bad is the pain?\"  (Scale 1-10; or mild, moderate, severe)      no  4. STOOL COMPOSITION: \"Are the stools hard?\"       no  5. BLOOD ON STOOLS: \"Has there been any blood on the toilet tissue or on the surface of the BM?\" If so, ask: \"When was the last time?\"       na  6. CHRONIC CONSTIPATION: \"Is this a new problem for you?\"  If no, ask: \"How long have you had this problem?\" (days, weeks, months)       no  7. CHANGES IN DIET: \"Have there been any recent changes in your diet?\"       no  8. MEDICATIONS: \"Have you been taking any new medications?\"      hydrocodone  9. LAXATIVES: \"Have you been using any laxatives or enemas?\"  If yes, ask \"What, how often, and when was the last time?\"      Stool softener, Colace; plans to take dulcolax tonight  10. CAUSE: \"What do you think is causing the constipation?\"         Pain medication  11. OTHER SYMPTOMS: \"Do you have any other symptoms?\" (e.g., abdominal pain, fever, vomiting)        denies  12. PREGNANCY: \"Is there any chance you are pregnant?\" \"When was your last menstrual period?\"        na    Protocols used: CONSTIPATION-ADULT-      "

## 2021-06-09 ENCOUNTER — TELEPHONE (OUTPATIENT)
Dept: FAMILY MEDICINE CLINIC | Facility: CLINIC | Age: 66
End: 2021-06-09

## 2021-06-09 NOTE — TELEPHONE ENCOUNTER
[facility], [place] [facility] is supposed to be faxing records from a 2006 hip replacement with product implant log with serial number / the entire report will be coming. Patient needs a copy of this, please look for & call 6/10

## 2021-06-11 ENCOUNTER — TELEPHONE (OUTPATIENT)
Dept: FAMILY MEDICINE CLINIC | Facility: CLINIC | Age: 66
End: 2021-06-11

## 2021-06-11 NOTE — TELEPHONE ENCOUNTER
Patient took 4 clindamycin 300 mg each before her dental appointment back in May.  She said her sugeron told her that was way too much.  She is still having some stomach issues even after taking probotics for a week and eating yogurt.  She wants to know if there is anything else she should be doing.  Her phone number is [phone].

## 2021-06-14 NOTE — TELEPHONE ENCOUNTER
Sheridan    Okay for hub to read    We were calling to see if she is still having stomach issues?

## 2021-06-24 ENCOUNTER — OFFICE VISIT (OUTPATIENT)
Dept: FAMILY MEDICINE CLINIC | Facility: CLINIC | Age: 66
End: 2021-06-24

## 2021-06-24 VITALS
OXYGEN SATURATION: 98 % | HEART RATE: 71 BPM | HEIGHT: 66 IN | SYSTOLIC BLOOD PRESSURE: 108 MMHG | DIASTOLIC BLOOD PRESSURE: 74 MMHG | BODY MASS INDEX: 32.95 KG/M2 | TEMPERATURE: 97.1 F | WEIGHT: 205 LBS

## 2021-06-24 DIAGNOSIS — L91.8 MULTIPLE ACQUIRED SKIN TAGS: Primary | ICD-10-CM

## 2021-06-24 DIAGNOSIS — E55.9 AVITAMINOSIS D: ICD-10-CM

## 2021-06-24 DIAGNOSIS — L81.9 DISCOLORED SKIN: ICD-10-CM

## 2021-06-24 DIAGNOSIS — E03.9 ADULT HYPOTHYROIDISM: ICD-10-CM

## 2021-06-24 DIAGNOSIS — L82.1 SEBORRHEIC KERATOSIS: ICD-10-CM

## 2021-06-24 PROCEDURE — 99214 OFFICE O/P EST MOD 30 MIN: CPT | Performed by: FAMILY MEDICINE

## 2021-06-24 NOTE — PROGRESS NOTES
Subjective   Oneida See is a 66 y.o. female.     Chief Complaint   Patient presents with   • skin discoloration   • skin tags       History of Present Illness   Patient is here today complaining on several skin issues.  Skin discoloration under her breasts.  Nevi.  And skin tags in axilla.  Follow-up on vitamin D deficiency.  Follow-up on hypothyroidism.      The following portions of the patient's history were reviewed and updated as appropriate: allergies, current medications, past family history, past medical history, past social history, past surgical history and problem list.    Past Medical History:   Diagnosis Date   • Arthritis    • Contact dermatitis and eczema due to plant     Poison ivy   • Disease of thyroid gland    • Floaters in visual field, bilateral    • H/O degenerative disc disease    • Hemangioma of liver     CT 2017   • Hip pain, chronic, left    • History of concussion    • Human papilloma virus (HPV) infection 2013    Overview:  2013 pap negative, HPV positive, 16/18 negative 10/2014 pap negative and HPV negative   • Lower back pain    • Memory change    • Multiple polyps of sigmoid colon    • Neck pain    • PONV (postoperative nausea and vomiting)    • Sleep apnea     cpap       Past Surgical History:   Procedure Laterality Date   •  SECTION      Dr Mishra   • COLONOSCOPY     • COLONOSCOPY W/ POLYPECTOMY  2016    : 1 polyp   • OOPHORECTOMY Bilateral      Dr Meraz  uterus intact   • TOTAL HIP ARTHROPLASTY      Right  Dr Hollins Left  Dr Hollins   • TOTAL HIP ARTHROPLASTY REVISION Left 2021    Procedure: LEFT HIP ACETABULUM REVISION LEFT POSTERIOR APPROACH;  Surgeon: Juanito Hollins MD;  Location: [facility];  Service: Orthopedics;  Laterality: Left;       Family History   Problem Relation Age of Onset   • Heart disease Father    • Breast cancer Maternal Aunt 70   • Malig Hyperthermia Neg Hx        Social History     Socioeconomic  History   • Marital status:      Spouse name: Not on file   • Number of children: Not on file   • Years of education: Not on file   • Highest education level: Not on file   Tobacco Use   • Smoking status: Former Smoker     Packs/day: 0.15     Years: 10.00     Pack years: 1.50     Types: Cigarettes     Quit date:      Years since quittin.5   • Smokeless tobacco: Never Used   • Tobacco comment: social    Vaping Use   • Vaping Use: Never used   Substance and Sexual Activity   • Alcohol use: Yes     Comment: occasionally   • Drug use: No   • Sexual activity: Defer       Current Outpatient Medications on File Prior to Visit   Medication Sig Dispense Refill   • Ascorbic Acid (VITAMIN C PO) Take 1 tablet by mouth Daily.     • Cholecalciferol (Vitamin D3) 1.25 MG (30971 UT) capsule Take 1 capsule by mouth Every 7 (Seven) Days. (Patient taking differently: Take 50,000 Units by mouth Every 7 (Seven) Days. SUNDAYS, HOLD 7 DAYS PRIOR TO SURGERY) 12 capsule 3   • Multiple Vitamins-Minerals (ZINC PO) Take 1 tablet by mouth Daily. HOLD FOR ONE WEEK PRIOR TO SURGERY     • SYNTHROID 125 MCG tablet TAKE 1 TABLET EVERY DAY (Patient taking differently: Take 125 mcg by mouth Daily.) 90 tablet 3   • Vitamin Mixture (VITAMIN E COMPLETE PO) Take 1 tablet by mouth Daily. Pt stated to hold 1 week prior to surgery     • [DISCONTINUED] HYDROcodone-acetaminophen (Norco) 7.5-325 MG per tablet Take 1-2 tablets by mouth Every 6 (Six) Hours As Needed for Moderate Pain . 50 tablet 0   • [DISCONTINUED] ondansetron (ZOFRAN) 4 MG tablet Take 1 tablet by mouth Every 6 (Six) Hours As Needed for Nausea or Vomiting. 25 tablet 1     No current facility-administered medications on file prior to visit.       Review of Systems   Skin: Positive for rash and skin lesions.       Recent Results (from the past 4704 hour(s))   TSH    Collection Time: 20  9:01 AM    Specimen: Blood   Result Value Ref Range    TSH 1.330 0.270 - 4.200 uIU/mL    Vitamin D 25 Hydroxy    Collection Time: 12/21/20  9:01 AM    Specimen: Blood   Result Value Ref Range    25 Hydroxy, Vitamin D 22.1 (L) 30.0 - 100.0 ng/ml   Vitamin B12 & Folate    Collection Time: 12/21/20  9:01 AM    Specimen: Blood   Result Value Ref Range    Vitamin B-12 293 211 - 946 pg/mL    Folate 13.20 4.78 - 24.20 ng/mL   Comprehensive Metabolic Panel    Collection Time: 12/21/20  9:01 AM    Specimen: Blood   Result Value Ref Range    Glucose 90 65 - 99 mg/dL    BUN 15 8 - 23 mg/dL    Creatinine 0.81 0.57 - 1.00 mg/dL    eGFR Non African Am 71 >60 mL/min/1.73    eGFR African Am 86 >60 mL/min/1.73    BUN/Creatinine Ratio 18.5 7.0 - 25.0    Sodium 138 136 - 145 mmol/L    Potassium 4.3 3.5 - 5.2 mmol/L    Chloride 104 98 - 107 mmol/L    Total CO2 25.8 22.0 - 29.0 mmol/L    Calcium 9.1 8.6 - 10.5 mg/dL    Total Protein 6.8 6.0 - 8.5 g/dL    Albumin 4.00 3.50 - 5.20 g/dL    Globulin 2.8 gm/dL    A/G Ratio 1.4 g/dL    Total Bilirubin 0.5 0.0 - 1.2 mg/dL    Alkaline Phosphatase 82 39 - 117 U/L    AST (SGOT) 13 1 - 32 U/L    ALT (SGPT) 14 1 - 33 U/L   Lipid Panel    Collection Time: 12/21/20  9:01 AM    Specimen: Blood   Result Value Ref Range    Total Cholesterol 202 (H) 0 - 200 mg/dL    Triglycerides 77 0 - 150 mg/dL    HDL Cholesterol 68 (H) 40 - 60 mg/dL    VLDL Cholesterol Jalen 14 5 - 40 mg/dL    LDL Chol Calc (NIH) 120 (H) 0 - 100 mg/dL   CBC & Differential    Collection Time: 12/21/20  9:01 AM    Specimen: Blood   Result Value Ref Range    WBC 4.37 3.40 - 10.80 10*3/mm3    RBC 4.46 3.77 - 5.28 10*6/mm3    Hemoglobin 14.0 12.0 - 15.9 g/dL    Hematocrit 41.9 34.0 - 46.6 %    MCV 93.9 79.0 - 97.0 fL    MCH 31.4 26.6 - 33.0 pg    MCHC 33.4 31.5 - 35.7 g/dL    RDW 12.7 12.3 - 15.4 %    Platelets 276 140 - 450 10*3/mm3    Neutrophil Rel % 54.5 42.7 - 76.0 %    Lymphocyte Rel % 33.2 19.6 - 45.3 %    Monocyte Rel % 9.6 5.0 - 12.0 %    Eosinophil Rel % 1.8 0.3 - 6.2 %    Basophil Rel % 0.7 0.0 - 1.5 %     Neutrophils Absolute 2.38 1.70 - 7.00 10*3/mm3    Lymphocytes Absolute 1.45 0.70 - 3.10 10*3/mm3    Monocytes Absolute 0.42 0.10 - 0.90 10*3/mm3    Eosinophils Absolute 0.08 0.00 - 0.40 10*3/mm3    Basophils Absolute 0.03 0.00 - 0.20 10*3/mm3    Immature Granulocyte Rel % 0.2 0.0 - 0.5 %    Immature Grans Absolute 0.01 0.00 - 0.05 10*3/mm3    nRBC 0.0 0.0 - 0.2 /100 WBC   aPTT    Collection Time: 02/04/21  8:56 AM    Specimen: Blood   Result Value Ref Range    PTT 26.3 22.7 - 35.4 seconds   Protime-INR    Collection Time: 02/04/21  8:56 AM    Specimen: Blood   Result Value Ref Range    Protime 12.2 11.7 - 14.2 Seconds    INR 0.93 0.90 - 1.10   Comprehensive Metabolic Panel    Collection Time: 02/04/21  8:56 AM    Specimen: Blood   Result Value Ref Range    Glucose 83 65 - 99 mg/dL    BUN 19 8 - 23 mg/dL    Creatinine 0.70 0.57 - 1.00 mg/dL    Sodium 140 136 - 145 mmol/L    Potassium 4.3 3.5 - 5.2 mmol/L    Chloride 106 98 - 107 mmol/L    CO2 23.0 22.0 - 29.0 mmol/L    Calcium 9.3 8.6 - 10.5 mg/dL    Total Protein 6.8 6.0 - 8.5 g/dL    Albumin 4.10 3.50 - 5.20 g/dL    ALT (SGPT) 11 1 - 33 U/L    AST (SGOT) 13 1 - 32 U/L    Alkaline Phosphatase 77 39 - 117 U/L    Total Bilirubin 0.5 0.0 - 1.2 mg/dL    eGFR Non African Amer 84 >60 mL/min/1.73    Globulin 2.7 gm/dL    A/G Ratio 1.5 g/dL    BUN/Creatinine Ratio 27.1 (H) 7.0 - 25.0    Anion Gap 11.0 5.0 - 15.0 mmol/L   Type & Screen    Collection Time: 02/04/21  8:56 AM    Specimen: Blood   Result Value Ref Range    ABO Type A     RH type Positive     Antibody Screen Negative     T&S Expiration Date 2/18/2021 11:59:00 PM    Urine Culture - Urine, Urine, Random Void    Collection Time: 02/04/21  8:57 AM    Specimen: Urine, Random Void   Result Value Ref Range    Urine Culture >100,000 CFU/mL Escherichia coli (A)        Susceptibility    Escherichia coli - TEA     Ampicillin >=32 Resistant ug/ml     Ampicillin + Sulbactam 16 Intermediate ug/ml     Cefazolin <=4 Susceptible  ug/ml     Cefepime <=1 Susceptible ug/ml     Ceftazidime <=1 Susceptible ug/ml     Ceftriaxone <=1 Susceptible ug/ml     Gentamicin <=1 Susceptible ug/ml     Levofloxacin <=0.12 Susceptible ug/ml     Nitrofurantoin <=16 Susceptible ug/ml     Piperacillin + Tazobactam <=4 Susceptible ug/ml     Tetracycline >=16 Resistant ug/ml     Trimethoprim + Sulfamethoxazole <=20 Susceptible ug/ml   Urinalysis With Microscopic If Indicated (No Culture) - Urine, Clean Catch    Collection Time: 02/04/21  8:57 AM    Specimen: Urine, Clean Catch   Result Value Ref Range    Color, UA Yellow Yellow, Straw    Appearance, UA Clear Clear    pH, UA <=5.0 5.0 - 8.0    Specific Gravity, UA 1.018 1.005 - 1.030    Glucose, UA Negative Negative    Ketones, UA Negative Negative    Bilirubin, UA Negative Negative    Blood, UA Moderate (2+) (A) Negative    Protein, UA Negative Negative    Leuk Esterase, UA Trace (A) Negative    Nitrite, UA Negative Negative    Urobilinogen, UA 0.2 E.U./dL 0.2 - 1.0 E.U./dL   CBC Auto Differential    Collection Time: 02/04/21  8:57 AM    Specimen: Blood   Result Value Ref Range    WBC 4.64 3.40 - 10.80 10*3/mm3    RBC 4.57 3.77 - 5.28 10*6/mm3    Hemoglobin 13.7 12.0 - 15.9 g/dL    Hematocrit 41.6 34.0 - 46.6 %    MCV 91.0 79.0 - 97.0 fL    MCH 30.0 26.6 - 33.0 pg    MCHC 32.9 31.5 - 35.7 g/dL    RDW 12.6 12.3 - 15.4 %    RDW-SD 41.4 37.0 - 54.0 fl    MPV 9.9 6.0 - 12.0 fL    Platelets 269 140 - 450 10*3/mm3    Neutrophil % 58.1 42.7 - 76.0 %    Lymphocyte % 29.3 19.6 - 45.3 %    Monocyte % 9.9 5.0 - 12.0 %    Eosinophil % 1.9 0.3 - 6.2 %    Basophil % 0.6 0.0 - 1.5 %    Immature Grans % 0.2 0.0 - 0.5 %    Neutrophils, Absolute 2.69 1.70 - 7.00 10*3/mm3    Lymphocytes, Absolute 1.36 0.70 - 3.10 10*3/mm3    Monocytes, Absolute 0.46 0.10 - 0.90 10*3/mm3    Eosinophils, Absolute 0.09 0.00 - 0.40 10*3/mm3    Basophils, Absolute 0.03 0.00 - 0.20 10*3/mm3    Immature Grans, Absolute 0.01 0.00 - 0.05 10*3/mm3    nRBC 0.0  0.0 - 0.2 /100 WBC   Urinalysis, Microscopic Only - Urine, Clean Catch    Collection Time: 02/04/21  8:57 AM    Specimen: Urine, Clean Catch   Result Value Ref Range    RBC, UA 3-5 (A) None Seen, 0-2 /HPF    WBC, UA 3-5 (A) None Seen, 0-2 /HPF    Bacteria, UA 3+ (A) None Seen /HPF    Squamous Epithelial Cells, UA 0-2 None Seen, 0-2 /HPF    Hyaline Casts, UA None Seen None Seen /LPF    Methodology Automated Microscopy    ECG 12 Lead    Collection Time: 02/04/21  9:40 AM   Result Value Ref Range    QT Interval 365 ms   Urine Culture - Urine, Urine, Clean Catch    Collection Time: 03/17/21 10:18 AM    Specimen: Urine, Clean Catch    UR   Result Value Ref Range    Urine Culture Final report     Result 1 Comment    Protime-INR    Collection Time: 03/22/21  7:40 AM    Specimen: Blood   Result Value Ref Range    Protime 13.0 11.7 - 14.2 Seconds    INR 1.00 0.90 - 1.10   aPTT    Collection Time: 03/22/21  7:40 AM    Specimen: Blood   Result Value Ref Range    PTT 28.9 22.7 - 35.4 seconds   Type & Screen    Collection Time: 03/22/21  7:41 AM    Specimen: Blood   Result Value Ref Range    ABO Type A     RH type Positive     Antibody Screen Negative     T&S Expiration Date 4/5/2021 11:59:00 PM    Comprehensive Metabolic Panel    Collection Time: 03/22/21  7:41 AM    Specimen: Blood   Result Value Ref Range    Glucose 85 65 - 99 mg/dL    BUN 13 8 - 23 mg/dL    Creatinine 0.70 0.57 - 1.00 mg/dL    Sodium 142 136 - 145 mmol/L    Potassium 4.1 3.5 - 5.2 mmol/L    Chloride 107 98 - 107 mmol/L    CO2 27.5 22.0 - 29.0 mmol/L    Calcium 8.6 8.6 - 10.5 mg/dL    Total Protein 6.3 6.0 - 8.5 g/dL    Albumin 3.90 3.50 - 5.20 g/dL    ALT (SGPT) 10 1 - 33 U/L    AST (SGOT) 13 1 - 32 U/L    Alkaline Phosphatase 77 39 - 117 U/L    Total Bilirubin 0.5 0.0 - 1.2 mg/dL    eGFR Non African Amer 83 >60 mL/min/1.73    Globulin 2.4 gm/dL    A/G Ratio 1.6 g/dL    BUN/Creatinine Ratio 18.6 7.0 - 25.0    Anion Gap 7.5 5.0 - 15.0 mmol/L   CBC Auto  Differential    Collection Time: 03/22/21  7:41 AM    Specimen: Blood   Result Value Ref Range    WBC 4.13 3.40 - 10.80 10*3/mm3    RBC 4.32 3.77 - 5.28 10*6/mm3    Hemoglobin 13.4 12.0 - 15.9 g/dL    Hematocrit 39.2 34.0 - 46.6 %    MCV 90.7 79.0 - 97.0 fL    MCH 31.0 26.6 - 33.0 pg    MCHC 34.2 31.5 - 35.7 g/dL    RDW 12.4 12.3 - 15.4 %    RDW-SD 40.6 37.0 - 54.0 fl    MPV 10.1 6.0 - 12.0 fL    Platelets 251 140 - 450 10*3/mm3    Neutrophil % 49.6 42.7 - 76.0 %    Lymphocyte % 36.1 19.6 - 45.3 %    Monocyte % 10.7 5.0 - 12.0 %    Eosinophil % 2.4 0.3 - 6.2 %    Basophil % 1.0 0.0 - 1.5 %    Immature Grans % 0.2 0.0 - 0.5 %    Neutrophils, Absolute 2.05 1.70 - 7.00 10*3/mm3    Lymphocytes, Absolute 1.49 0.70 - 3.10 10*3/mm3    Monocytes, Absolute 0.44 0.10 - 0.90 10*3/mm3    Eosinophils, Absolute 0.10 0.00 - 0.40 10*3/mm3    Basophils, Absolute 0.04 0.00 - 0.20 10*3/mm3    Immature Grans, Absolute 0.01 0.00 - 0.05 10*3/mm3    nRBC 0.0 0.0 - 0.2 /100 WBC   Urine Culture - Urine, Urine, Clean Catch    Collection Time: 03/22/21  7:53 AM    Specimen: Urine, Clean Catch   Result Value Ref Range    Urine Culture No growth    Urinalysis without microscopic (no culture) - Urine, Clean Catch    Collection Time: 03/22/21  7:53 AM    Specimen: Urine, Clean Catch   Result Value Ref Range    Color, UA Yellow Yellow, Straw    Appearance, UA Clear Clear    pH, UA <=5.0 5.0 - 8.0    Specific Gravity, UA 1.015 1.005 - 1.030    Glucose, UA Negative Negative    Ketones, UA Negative Negative    Bilirubin, UA Negative Negative    Blood, UA Small (1+) (A) Negative    Protein, UA Negative Negative    Leuk Esterase, UA Negative Negative    Nitrite, UA Negative Negative    Urobilinogen, UA 0.2 E.U./dL 0.2 - 1.0 E.U./dL   Urinalysis, Microscopic Only - Urine, Clean Catch    Collection Time: 03/22/21  7:53 AM    Specimen: Urine, Clean Catch   Result Value Ref Range    RBC, UA 0-2 None Seen, 0-2 /HPF    WBC, UA 0-2 None Seen, 0-2 /HPF     "Bacteria, UA None Seen None Seen /HPF    Squamous Epithelial Cells, UA 0-2 None Seen, 0-2 /HPF    Hyaline Casts, UA None Seen None Seen /LPF    Methodology Automated Microscopy    ECG 12 Lead    Collection Time: 03/22/21  8:38 AM   Result Value Ref Range    QT Interval 386 ms   COVID-19,BIOTAP, NP/OP SWAB IN TRANSPORT MEDIA OR SALINE 24-36 HR TAT - Swab, Nasopharynx    Collection Time: 04/02/21  3:47 PM    Specimen: Nasopharynx; Swab   Result Value Ref Range    SARS-CoV-2 PCR Not Detected Not Detected   Basic Metabolic Panel    Collection Time: 04/06/21  5:11 AM    Specimen: Blood   Result Value Ref Range    Glucose 106 (H) 65 - 99 mg/dL    BUN 13 8 - 23 mg/dL    Creatinine 0.64 0.57 - 1.00 mg/dL    Sodium 141 136 - 145 mmol/L    Potassium 4.6 3.5 - 5.2 mmol/L    Chloride 108 (H) 98 - 107 mmol/L    CO2 26.2 22.0 - 29.0 mmol/L    Calcium 8.5 (L) 8.6 - 10.5 mg/dL    eGFR Non African Amer 93 >60 mL/min/1.73    BUN/Creatinine Ratio 20.3 7.0 - 25.0    Anion Gap 6.8 5.0 - 15.0 mmol/L   Hemoglobin & Hematocrit, Blood    Collection Time: 04/06/21  5:11 AM    Specimen: Blood   Result Value Ref Range    Hemoglobin 12.9 12.0 - 15.9 g/dL    Hematocrit 38.2 34.0 - 46.6 %     Objective   Vitals:    06/24/21 0938   BP: 108/74   Pulse: 71   Temp: 97.1 °F (36.2 °C)   SpO2: 98%   Weight: 93 kg (205 lb)   Height: 167.6 cm (65.98\")     Body mass index is 33.1 kg/m².  Physical Exam  Vitals and nursing note reviewed.   Constitutional:       General: She is not in acute distress.     Appearance: She is well-developed. She is not diaphoretic.   Cardiovascular:      Rate and Rhythm: Normal rate and regular rhythm.   Pulmonary:      Effort: Pulmonary effort is normal. No respiratory distress.      Breath sounds: Normal breath sounds. No wheezing.   Skin:     Comments: Skin tags on the right axilla.  Seborrheic keratosis lesions on the abdomen.  Discolored skin which could be consistent with fungal infection under the breast "           Diagnoses and all orders for this visit:    1. Multiple acquired skin tags (Primary)  -     Ambulatory Referral to Dermatology    2. Avitaminosis D  Comments:  Follow-up today, labs today continue current medications  Orders:  -     Vitamin D 25 Hydroxy    3. Adult hypothyroidism  Comments:  Follow-up.  Labs today.  Continue current dose  Orders:  -     TSH    4. Seborrheic keratosis  -     Ambulatory Referral to Dermatology    5. Discolored skin  -     Ambulatory Referral to Dermatology    Return if symptoms worsen or fail to improve.

## 2021-06-25 LAB
25(OH)D3+25(OH)D2 SERPL-MCNC: 40.7 NG/ML (ref 30–100)
TSH SERPL DL<=0.005 MIU/L-ACNC: 1.06 UIU/ML (ref 0.27–4.2)

## 2021-07-19 DIAGNOSIS — E03.9 PRIMARY HYPOTHYROIDISM: ICD-10-CM

## 2021-07-19 NOTE — TELEPHONE ENCOUNTER
Caller: [facility] - [facility] [address] - [phone]  - [phone] FX    Relationship: Pharmacy    Medication needed:   Requested Prescriptions     Pending Prescriptions Disp Refills   • Synthroid 125 MCG tablet 90 tablet 3     Sig: Take 1 tablet by mouth Daily.       When do you need the refill by: SOON    Does the patient have less than a 3 day supply:  [] Yes  [] No    What is the patient's preferred pharmacy:  [facility] [place] [address] [facility] [address]  - [phone] [facility] [phone] FX  [phone]

## 2021-07-20 RX ORDER — LEVOTHYROXINE SODIUM 125 MCG
125 TABLET ORAL DAILY
Qty: 90 TABLET | Refills: 3 | OUTPATIENT
Start: 2021-07-20

## 2021-07-21 DIAGNOSIS — E03.9 PRIMARY HYPOTHYROIDISM: ICD-10-CM

## 2021-07-21 NOTE — TELEPHONE ENCOUNTER
Caller: Mayi Oneida    Relationship: Self    Best call back number: [phone]    Medication needed:   Requested Prescriptions     Pending Prescriptions Disp Refills   • Synthroid 125 MCG tablet 90 tablet 3     Sig: Take 1 tablet by mouth Daily.       When do you need the refill by: 07/21/2021     What additional details did the patient provide when requesting the medication: PATIENT IS TAKING HER LAST PILL TONIGHT AND [facility] IS NEEDING A NEW PRESCRIPTION AND A 3 MONTH SUPPLY. SHE STATED THAT SHE WOULD ALSO LIKE TO KNOW IF THERE ARE ANY SAMPLES THAT CAN BE PICKED UP SO SHE DOES NOT GO WITHOUT MEDICATION AND IF NOT, TO PLEASE SEND A 30 DAY SUPPLY TO HER LOCAL PHARMACY THAT SHE USES     [facility] [place] [address] [address] [facility] [address]  [facility] [phone]  - [phone] FX     Does the patient have less than a 3 day supply:  [x] Yes  [] No    What is the patient's preferred pharmacy: [facility]IV[facility] - [facility] [address] [facility] - [phone] [facility] [phone] FX

## 2021-07-21 NOTE — TELEPHONE ENCOUNTER
Ok for hub to relay message:    Called [facility] and called in refill for patients snythroid 125 mcg tablet. 30 days supply and 0 refills.    Please let patient know if patient calls, I have also sent in 90 day supply to [facility] with 3 additional refills.

## 2021-07-22 ENCOUNTER — TELEPHONE (OUTPATIENT)
Dept: FAMILY MEDICINE CLINIC | Facility: CLINIC | Age: 66
End: 2021-07-22

## 2021-07-22 RX ORDER — LEVOTHYROXINE SODIUM 125 MCG
125 TABLET ORAL DAILY
Qty: 90 TABLET | Refills: 3 | Status: SHIPPED | OUTPATIENT
Start: 2021-07-22 | End: 2021-07-23 | Stop reason: SDUPTHER

## 2021-07-22 NOTE — TELEPHONE ENCOUNTER
HUB CAN READ    [facility] is trying to contact patient to schedule. Unable to leave message because voicemail is full. She can call [phone] and schedule her appt

## 2021-07-23 DIAGNOSIS — E03.9 PRIMARY HYPOTHYROIDISM: ICD-10-CM

## 2021-07-23 RX ORDER — LEVOTHYROXINE SODIUM 125 MCG
125 TABLET ORAL DAILY
Qty: 90 TABLET | Refills: 3 | Status: SHIPPED | OUTPATIENT
Start: 2021-07-23 | End: 2021-12-03 | Stop reason: SDUPTHER

## 2021-07-23 NOTE — TELEPHONE ENCOUNTER
Caller: Oneida See    Relationship: Self    Best call back number:[phone]  Medication needed:   Requested Prescriptions     Pending Prescriptions Disp Refills   • Synthroid 125 MCG tablet 90 tablet 3     Sig: Take 1 tablet by mouth Daily.         What additional details did the patient provide when requesting the medication: PATIENT WANTS BRAND NOT GENERIC IF POSSIBLE PLEASE SEND IN A 90 DAY SUPPLY     Does the patient have less than a 3 day supply:  [x] Yes  [] No    What is the patient's preferred pharmacy: [facility] [place] [address] [address] [facility] [address]  - [phone]  - [phone]

## 2021-08-18 ENCOUNTER — TELEPHONE (OUTPATIENT)
Dept: FAMILY MEDICINE CLINIC | Facility: CLINIC | Age: 66
End: 2021-08-18

## 2021-08-18 NOTE — TELEPHONE ENCOUNTER
Caller: Oneida See    Relationship: Self    Best call back number:[phone]    Who are you requesting to speak with (clinical staff, provider,  specific staff member): CLINICAL STAFF     What was the call regarding: HAVING SOME STOMACH ISSUES AND NEEDS TO SPEAK TO SOMEONE     Do you require a callback: YES

## 2021-08-19 NOTE — TELEPHONE ENCOUNTER
Ok for hub to relay message:    Attempted to call patient. Could not leave message due to mailbox being full.   Returned patient call and wanted to offer her a visit with provider.       Per provider, please schedule patient an office visit/video visit to discuss stomach problems patient is having.

## 2021-08-24 ENCOUNTER — TELEPHONE (OUTPATIENT)
Dept: FAMILY MEDICINE CLINIC | Facility: CLINIC | Age: 66
End: 2021-08-24

## 2021-08-24 NOTE — TELEPHONE ENCOUNTER
"Called pt: pt states that her June mayela was about \"stomach issues', she had to take a large dose of Amoxil before ortho procedure and developed sharp pain in lower abdomen and fells full and hard. Sharp pains ( in the last few days) resolved spontaneously). Has days when its better, but sometimes gets worse. Takes probiotics. Continue probiotics. Colonoscopy is up to date. It was normal last time. BMs are regular.Recommended no dairy 3-4 weeks. Follow up in 3-4 week.  "

## 2021-08-24 NOTE — TELEPHONE ENCOUNTER
"Caller: Oneida See     Relationship: Self     Best call back number: [phone]      What was the call regarding: THE PATIENT STATES THAT SHE HAD A \"MISCOMUNICATION BETWEEN THE DENTIST AND ORTHOPEDIC SURGEON\" AND WAS GIVEN TOO MANY ANTIBIOTICS. IN RESULTS THE PATIENT WAS HAVING STOMACH ISSUES. THE PATIENT STATES THAT SHE WAS TOLD THAT THERE WERE NO NOTES OF STOMACH ISSUES IN HER APPOINTMENT NOTES WITH DR. COLEMAN. THE PATIENT IS STATING THAT SHE STILL HAS STOMACH ISSUES. THE PATIENT STATES THAT SHE WAS SUPPOSED TO HAVE HER DIET CHANGED BY DR. COLEMAN AND THE PATIENT IS WANTING TO KNOW WHAT TO DO NEXT. THE PATIENT WAS READ MESSAGE LEFT BY JENNIFER 08/19/2021. THE PATIENT STATES THAT SHE DOESN'T WANT TO COME INTO THE OFFICE AND PAY FOR ANOTHER APPOINTMENT. LAST OFFICE VISIT 06/24/21.        Please advise.  "

## 2021-08-24 NOTE — TELEPHONE ENCOUNTER
"Caller: Jacob Seeki    Relationship: Self    Best call back number: [phone]     What was the call regarding: THE PATIENT STATES THAT SHE HAD A \"MISCOMUNICATION BETWEEN THE DENTIST AND ORTHOPEDIC SURGEON\" AND WAS GIVEN TOO MANY ANTIBIOTICS. IN RESULTS THE PATIENT WAS HAVING STOMACH ISSUES. THE PATIENT STATES THAT SHE WAS TOLD THAT THERE WERE NO NOTES OF STOMACH ISSUES IN HER APPOINTMENT NOTES WITH DR. COLEMAN. THE PATIENT IS STATING THAT SHE STILL HAS STOMACH ISSUES. THE PATIENT STATES THAT SHE WAS SUPPOSED TO HAVE HER DIET CHANGED BY DR. COLEMAN AND THE PATIENT IS WANTING TO KNOW WHAT TO DO NEXT. THE PATIENT WAS READ MESSAGE LEFT BY JENNIFER 08/19/2021. THE PATIENT STATES THAT SHE DOESN'T WANT TO COME INTO THE OFFICE AND PAY FOR ANOTHER APPOINTMENT. LAST OFFICE VISIT 06/24/21.     Do you require a callback: YES        "

## 2021-11-11 ENCOUNTER — OFFICE VISIT (OUTPATIENT)
Dept: FAMILY MEDICINE CLINIC | Facility: CLINIC | Age: 66
End: 2021-11-11

## 2021-11-11 VITALS
BODY MASS INDEX: 32.78 KG/M2 | HEART RATE: 78 BPM | SYSTOLIC BLOOD PRESSURE: 112 MMHG | DIASTOLIC BLOOD PRESSURE: 78 MMHG | HEIGHT: 66 IN | RESPIRATION RATE: 16 BRPM | TEMPERATURE: 97.3 F | OXYGEN SATURATION: 99 % | WEIGHT: 204 LBS

## 2021-11-11 DIAGNOSIS — G89.29 CHRONIC NECK PAIN: Primary | ICD-10-CM

## 2021-11-11 DIAGNOSIS — Z28.82 PARENT REFUSES IMMUNIZATIONS: ICD-10-CM

## 2021-11-11 DIAGNOSIS — M54.2 CHRONIC NECK PAIN: Primary | ICD-10-CM

## 2021-11-11 DIAGNOSIS — Z91.199 MEDICALLY NONCOMPLIANT: ICD-10-CM

## 2021-11-11 PROCEDURE — 99213 OFFICE O/P EST LOW 20 MIN: CPT | Performed by: FAMILY MEDICINE

## 2021-11-11 RX ORDER — CHOLECALCIFEROL (VITAMIN D3) 1250 MCG
CAPSULE ORAL
Qty: 12 CAPSULE | Refills: 3 | Status: SHIPPED | OUTPATIENT
Start: 2021-11-11 | End: 2022-08-30 | Stop reason: SDUPTHER

## 2021-12-03 DIAGNOSIS — E03.9 PRIMARY HYPOTHYROIDISM: ICD-10-CM

## 2021-12-06 RX ORDER — LEVOTHYROXINE SODIUM 125 MCG
125 TABLET ORAL DAILY
Qty: 90 TABLET | Refills: 3 | Status: SHIPPED | OUTPATIENT
Start: 2021-12-06 | End: 2022-07-18

## 2021-12-07 NOTE — TELEPHONE ENCOUNTER
Caller: Oneida See    Relationship: Self    Best call back number: [phone]     Requested Prescriptions:   Requested Prescriptions     Pending Prescriptions Disp Refills   • Synthroid 125 MCG tablet 90 tablet 3     Sig: Take 1 tablet by mouth Daily.        Pharmacy where request should be sent: [facility] [address] AT [facility] & [facility] - [phone]  - [phone] FX     Additional details provided by patient: PATIENT STATES SHE IS OUT MEDICATION    Does the patient have less than a 3 day supply:  [x] Yes  [] No    Rossana Lepe Rep   12/03/21 13:43 EST           
Rx Refill Note  Requested Prescriptions     Pending Prescriptions Disp Refills   • Synthroid 125 MCG tablet 90 tablet 3     Sig: Take 1 tablet by mouth Daily.      Last office visit with prescribing clinician: 11/11/2021      Next office visit with prescribing clinician: 12/23/2021            Wm Tejada MA  12/06/21, 19:47 EST  
all other ROS negative except as per HPI

## 2022-01-14 ENCOUNTER — TELEMEDICINE (OUTPATIENT)
Dept: FAMILY MEDICINE CLINIC | Facility: CLINIC | Age: 67
End: 2022-01-14

## 2022-01-14 DIAGNOSIS — J01.00 ACUTE NON-RECURRENT MAXILLARY SINUSITIS: ICD-10-CM

## 2022-01-14 DIAGNOSIS — R05.9 COUGH: Primary | ICD-10-CM

## 2022-01-14 PROCEDURE — 99213 OFFICE O/P EST LOW 20 MIN: CPT | Performed by: FAMILY MEDICINE

## 2022-01-14 RX ORDER — AZITHROMYCIN 250 MG/1
TABLET, FILM COATED ORAL
Qty: 6 TABLET | Refills: 0 | Status: SHIPPED | OUTPATIENT
Start: 2022-01-14 | End: 2022-06-16

## 2022-01-20 ENCOUNTER — TELEPHONE (OUTPATIENT)
Dept: FAMILY MEDICINE CLINIC | Facility: CLINIC | Age: 67
End: 2022-01-20

## 2022-01-20 DIAGNOSIS — G89.29 CHRONIC PAIN OF BOTH EARS: Primary | ICD-10-CM

## 2022-01-20 DIAGNOSIS — H92.03 CHRONIC PAIN OF BOTH EARS: Primary | ICD-10-CM

## 2022-01-20 NOTE — TELEPHONE ENCOUNTER
Caller: Oneida See    Relationship: Self    Best call back number:[phone]    What is the medical concern/diagnosis: LEFT EAR CLOGGED/CAN'T HEAR. ANTIBIOTICS DIDN'T HELP    What specialty or service is being requested: ENT    What is the provider, practice or medical service name: N/A    What is the office location: [facility]    What is the office phone number: N/A    Any additional details: PATIENT STATES THAT A NEW ENT OFFICE HAS OPENED NEAR HER BUT SHE DOESN'T HAVE ANY OTHER INFORMATION. SHE WILL SEE WHOMEVER IS RECOMMENDED.

## 2022-03-15 ENCOUNTER — TELEPHONE (OUTPATIENT)
Dept: FAMILY MEDICINE CLINIC | Facility: CLINIC | Age: 67
End: 2022-03-15

## 2022-03-15 NOTE — TELEPHONE ENCOUNTER
OK for HUB to read and schedule:    LMTCB-  Your provider recommended an annual wellness visit.  Please call our office to schedule.

## 2022-04-27 ENCOUNTER — TELEPHONE (OUTPATIENT)
Dept: FAMILY MEDICINE CLINIC | Facility: CLINIC | Age: 67
End: 2022-04-27

## 2022-05-11 NOTE — DISCHARGE PLACEMENT REQUEST
"Oneida Marie (66 y.o. Female)     Date of Birth Social Security Number Address Home Phone MRN    [id number]  [id number]  [facility] [phone] [id number]    Denominational Marital Status          Guine[place] Faith        Admission Date Admission Type Admitting Provider Attending Provider Department, Room/Bed    4/5/21 Elective Juanito Hollins MD Sweet, Richard A, MD [address], P890/1    Discharge Date Discharge Disposition Discharge Destination         Home or Self Care              Attending Provider: Juanito Hollins MD    Allergies: Penicillins    Isolation: None   Infection: None   Code Status: CPR    Ht: 167.6 cm (66\")   Wt: 93.7 kg (206 lb 9.1 oz)    Admission Cmt: None   Principal Problem: None                Active Insurance as of 4/5/2021     Primary Coverage     Payor Plan Insurance Group Employer/Plan Group    HUMANA MEDICARE REPLACEMENT HUMANA MEDICARE REPLACEMENT [id number]     Payor Plan Address Payor Plan Phone Number Payor Plan Fax Number Effective Dates    [address] [address] [phone]  2/1/2019 - None Entered    [facility] [address]       Subscriber Name Subscriber Birth Date Member ID       ONEIDA MARIE 1955 [id number]                 Emergency Contacts      (Rel.) Home Phone Work Phone Mobile Phone    Baltazar Marie (Son) [phone] -- --    MayiVincenzo (Son) [phone] -- --              " Patient reports that back in December, she was lifting when she felt an acute onset of some left shoulder pain.  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  Pain is very intermittent in nature, but has not really improved

## 2022-06-16 ENCOUNTER — OFFICE VISIT (OUTPATIENT)
Dept: FAMILY MEDICINE CLINIC | Facility: CLINIC | Age: 67
End: 2022-06-16

## 2022-06-16 VITALS
OXYGEN SATURATION: 98 % | WEIGHT: 200.2 LBS | TEMPERATURE: 98.2 F | SYSTOLIC BLOOD PRESSURE: 106 MMHG | BODY MASS INDEX: 32.17 KG/M2 | HEART RATE: 84 BPM | HEIGHT: 66 IN | DIASTOLIC BLOOD PRESSURE: 72 MMHG

## 2022-06-16 DIAGNOSIS — R31.21 ASYMPTOMATIC MICROSCOPIC HEMATURIA: ICD-10-CM

## 2022-06-16 DIAGNOSIS — E55.9 VITAMIN D DEFICIENCY: ICD-10-CM

## 2022-06-16 DIAGNOSIS — Z78.0 POSTMENOPAUSAL: ICD-10-CM

## 2022-06-16 DIAGNOSIS — Z00.00 MEDICARE ANNUAL WELLNESS VISIT, SUBSEQUENT: Primary | ICD-10-CM

## 2022-06-16 DIAGNOSIS — E78.2 MIXED HYPERLIPIDEMIA: ICD-10-CM

## 2022-06-16 DIAGNOSIS — R53.82 CHRONIC FATIGUE: ICD-10-CM

## 2022-06-16 LAB
BILIRUB BLD-MCNC: ABNORMAL MG/DL
CLARITY, POC: CLEAR
COLOR UR: YELLOW
EXPIRATION DATE: ABNORMAL
GLUCOSE UR STRIP-MCNC: NEGATIVE MG/DL
KETONES UR QL: NEGATIVE
LEUKOCYTE EST, POC: NEGATIVE
Lab: ABNORMAL
NITRITE UR-MCNC: NEGATIVE MG/ML
PH UR: 5.5 [PH] (ref 5–8)
PROT UR STRIP-MCNC: NEGATIVE MG/DL
RBC # UR STRIP: ABNORMAL /UL
SP GR UR: 1.03 (ref 1–1.03)
UROBILINOGEN UR QL: NORMAL

## 2022-06-16 PROCEDURE — 96160 PT-FOCUSED HLTH RISK ASSMT: CPT | Performed by: FAMILY MEDICINE

## 2022-06-16 PROCEDURE — G0439 PPPS, SUBSEQ VISIT: HCPCS | Performed by: FAMILY MEDICINE

## 2022-06-16 PROCEDURE — 81003 URINALYSIS AUTO W/O SCOPE: CPT | Performed by: FAMILY MEDICINE

## 2022-06-16 PROCEDURE — 1170F FXNL STATUS ASSESSED: CPT | Performed by: FAMILY MEDICINE

## 2022-06-16 PROCEDURE — 1159F MED LIST DOCD IN RCRD: CPT | Performed by: FAMILY MEDICINE

## 2022-06-16 NOTE — PROGRESS NOTES
The ABCs of the Annual Wellness Visit  Subsequent Medicare Wellness Visit    Chief Complaint   Patient presents with   • Medicare Wellness-subsequent      Subjective    History of Present Illness:  Oneida See is a 67 y.o. female who presents for a Subsequent Medicare Wellness Visit.    The following portions of the patient's history were reviewed and   updated as appropriate: allergies, current medications, past family history, past medical history, past social history, past surgical history and problem list.    Compared to one year ago, the patient feels her physical   health is the same.    Compared to one year ago, the patient feels her mental   health is the same.    Recent Hospitalizations:  She was not admitted to the hospital during the last year.       Current Medical Providers:  Patient Care Team:  Rosalee Sandy MD as PCP - General (Family Medicine)    Outpatient Medications Prior to Visit   Medication Sig Dispense Refill   • Ascorbic Acid (VITAMIN C PO) Take 1 tablet by mouth Daily.     • Cholecalciferol (Vitamin D3) 1.25 MG (27897 UT) capsule Take once  Week. 12 capsule 3   • Multiple Vitamins-Minerals (ZINC PO) Take 1 tablet by mouth Daily. HOLD FOR ONE WEEK PRIOR TO SURGERY     • Synthroid 125 MCG tablet Take 1 tablet by mouth Daily. 90 tablet 3   • Vitamin Mixture (VITAMIN E COMPLETE PO) Take 1 tablet by mouth Daily. Pt stated to hold 1 week prior to surgery     • azithromycin (Zithromax) 250 MG tablet Take 2 tablets the first day, then 1 tablet daily for 4 days. 6 tablet 0     No facility-administered medications prior to visit.       No opioid medication identified on active medication list. I have reviewed chart for other potential  high risk medication/s and harmful drug interactions in the elderly.          Aspirin is not on active medication list.  Aspirin use is not indicated based on review of current medical condition/s. Risk of harm outweighs potential benefits.  .    Patient Active  "Problem List   Diagnosis   • Adult hypothyroidism   • LBP (low back pain)   • Chronic neck pain   • Obstructive apnea   • Vitamin D deficiency   • Medicare annual wellness visit, subsequent   • HPV in female   • Colon polyp   • Gastroesophageal reflux disease without esophagitis   • Thoracic radiculopathy   • Slow transit constipation   • Asymptomatic microscopic hematuria   • Chest wall pain   • Degenerative joint disease (DJD) of hip   • DJD (degenerative joint disease)   • Multiple acquired skin tags   • Seborrheic keratosis   • Discolored skin   • Medically noncompliant   • Parent refuses immunizations   • Cough   • Acute maxillary sinusitis   • Postmenopausal   • Mixed hyperlipidemia   • Chronic fatigue     Advance Care Planning  Advance Directive is not on file.  ACP discussion was held with the patient during this visit. Patient does not have an advance directive, information provided.    Review of Systems   Constitutional: Negative.         Objective    Vitals:    06/16/22 0744   BP: 106/72   BP Location: Right arm   Patient Position: Sitting   Pulse: 84   Temp: 98.2 °F (36.8 °C)   SpO2: 98%   Weight: 90.8 kg (200 lb 3.2 oz)   Height: 167.6 cm (65.98\")     Estimated body mass index is 32.33 kg/m² as calculated from the following:    Height as of this encounter: 167.6 cm (65.98\").    Weight as of this encounter: 90.8 kg (200 lb 3.2 oz).    BMI is >= 30 and <35. (Class 1 Obesity). The following options were offered after discussion;: exercise counseling/recommendations      Does the patient have evidence of cognitive impairment? No    Physical Exam  Vitals and nursing note reviewed.   Constitutional:       Appearance: Normal appearance.   Neurological:      Mental Status: She is alert.                 HEALTH RISK ASSESSMENT    Smoking Status:  Social History     Tobacco Use   Smoking Status Former Smoker   • Packs/day: 0.15   • Years: 10.00   • Pack years: 1.50   • Types: Cigarettes   • Quit date: 1984   • " Years since quittin.4   Smokeless Tobacco Never Used   Tobacco Comment    social      Alcohol Consumption:  Social History     Substance and Sexual Activity   Alcohol Use Yes    Comment: occasionally     Fall Risk Screen:    JILLIAN Fall Risk Assessment was completed, and patient is at LOW risk for falls.Assessment completed on:2022    Depression Screening:  PHQ-2/PHQ-9 Depression Screening 2022   Retired PHQ-9 Total Score -   Retired Total Score -   Little Interest or Pleasure in Doing Things 0-->not at all   PHQ-9: Brief Depression Severity Measure Score 0       Health Habits and Functional and Cognitive Screening:  Functional & Cognitive Status 2022   Do you have difficulty preparing food and eating? No   Do you have difficulty bathing yourself, getting dressed or grooming yourself? No   Do you have difficulty using the toilet? No   Do you have difficulty moving around from place to place? No   Do you have trouble with steps or getting out of a bed or a chair? No   Current Diet Well Balanced Diet   Dental Exam Up to date   Eye Exam Not up to date   Exercise (times per week) 3 times per week   Current Exercises Include No Regular Exercise;Walking   Do you need help using the phone?  No   Are you deaf or do you have serious difficulty hearing?  No   Do you need help with transportation? No   Do you need help shopping? No   Do you need help preparing meals?  No   Do you need help with housework?  No   Do you need help with laundry? No   Do you need help taking your medications? No   Do you need help managing money? No   Do you ever drive or ride in a car without wearing a seat belt? No   Have you felt unusual stress, anger or loneliness in the last month? No   Who do you live with? Spouse   If you need help, do you have trouble finding someone available to you? No   Have you been bothered in the last four weeks by sexual problems? -   Do you have difficulty concentrating, remembering or making  decisions? No       Age-appropriate Screening Schedule:  Refer to the list below for future screening recommendations based on patient's age, sex and/or medical conditions. Orders for these recommended tests are listed in the plan section. The patient has been provided with a written plan.    Health Maintenance   Topic Date Due   • DXA SCAN  12/12/2018   • LIPID PANEL  06/16/2022   • ZOSTER VACCINE (1 of 2) 06/16/2022 (Originally 2/9/2004)   • TDAP/TD VACCINES (2 - Td or Tdap) 09/10/2023   • INFLUENZA VACCINE  Discontinued   • MAMMOGRAM  Discontinued   • PAP SMEAR  Discontinued              Assessment & Plan   CMS Preventative Services Quick Reference  Risk Factors Identified During Encounter  Fall Risk-High or Moderate  The above risks/problems have been discussed with the patient.  Follow up actions/plans if indicated are seen below in the Assessment/Plan Section.  Pertinent information has been shared with the patient in the After Visit Summary.    Diagnoses and all orders for this visit:    1. Medicare annual wellness visit, subsequent (Primary)    2. Postmenopausal  -     DEXA Bone Density Axial; Future    3. Asymptomatic microscopic hematuria  -     POCT urinalysis dipstick, automated    4. Mixed hyperlipidemia  -     Comprehensive Metabolic Panel  -     Lipid Panel    5. Chronic fatigue  -     Vitamin D 25 Hydroxy  -     TSH  -     Vitamin B12 & Folate  -     Comprehensive Metabolic Panel  -     CBC & Differential    6. Vitamin D deficiency  -     Vitamin D 25 Hydroxy        Follow Up:      Return in about 6 months (around 12/16/2022) for HYPERLIPIDEMIA.    An After Visit Summary and PPPS were made available to the patient.

## 2022-06-17 LAB
25(OH)D3+25(OH)D2 SERPL-MCNC: 35.1 NG/ML (ref 30–100)
ALBUMIN SERPL-MCNC: 4.1 G/DL (ref 3.8–4.8)
ALBUMIN/GLOB SERPL: 1.8 {RATIO} (ref 1.2–2.2)
ALP SERPL-CCNC: 87 IU/L (ref 44–121)
ALT SERPL-CCNC: 12 IU/L (ref 0–32)
AST SERPL-CCNC: 11 IU/L (ref 0–40)
BASOPHILS # BLD AUTO: 0.1 X10E3/UL (ref 0–0.2)
BASOPHILS NFR BLD AUTO: 1 %
BILIRUB SERPL-MCNC: 0.6 MG/DL (ref 0–1.2)
BUN SERPL-MCNC: 20 MG/DL (ref 8–27)
BUN/CREAT SERPL: 23 (ref 12–28)
CALCIUM SERPL-MCNC: 9.2 MG/DL (ref 8.7–10.3)
CHLORIDE SERPL-SCNC: 106 MMOL/L (ref 96–106)
CHOLEST SERPL-MCNC: 220 MG/DL (ref 100–199)
CO2 SERPL-SCNC: 22 MMOL/L (ref 20–29)
CREAT SERPL-MCNC: 0.86 MG/DL (ref 0.57–1)
EGFRCR SERPLBLD CKD-EPI 2021: 74 ML/MIN/1.73
EOSINOPHIL # BLD AUTO: 0.1 X10E3/UL (ref 0–0.4)
EOSINOPHIL NFR BLD AUTO: 2 %
ERYTHROCYTE [DISTWIDTH] IN BLOOD BY AUTOMATED COUNT: 12.7 % (ref 11.7–15.4)
FOLATE SERPL-MCNC: 17.1 NG/ML
GLOBULIN SER CALC-MCNC: 2.3 G/DL (ref 1.5–4.5)
GLUCOSE SERPL-MCNC: 89 MG/DL (ref 65–99)
HCT VFR BLD AUTO: 42.1 % (ref 34–46.6)
HDLC SERPL-MCNC: 61 MG/DL
HGB BLD-MCNC: 14.1 G/DL (ref 11.1–15.9)
IMM GRANULOCYTES # BLD AUTO: 0 X10E3/UL (ref 0–0.1)
IMM GRANULOCYTES NFR BLD AUTO: 0 %
LDLC SERPL CALC-MCNC: 143 MG/DL (ref 0–99)
LYMPHOCYTES # BLD AUTO: 1.5 X10E3/UL (ref 0.7–3.1)
LYMPHOCYTES NFR BLD AUTO: 31 %
MCH RBC QN AUTO: 31 PG (ref 26.6–33)
MCHC RBC AUTO-ENTMCNC: 33.5 G/DL (ref 31.5–35.7)
MCV RBC AUTO: 93 FL (ref 79–97)
MONOCYTES # BLD AUTO: 0.5 X10E3/UL (ref 0.1–0.9)
MONOCYTES NFR BLD AUTO: 9 %
NEUTROPHILS # BLD AUTO: 2.7 X10E3/UL (ref 1.4–7)
NEUTROPHILS NFR BLD AUTO: 57 %
PLATELET # BLD AUTO: 294 X10E3/UL (ref 150–450)
POTASSIUM SERPL-SCNC: 4.5 MMOL/L (ref 3.5–5.2)
PROT SERPL-MCNC: 6.4 G/DL (ref 6–8.5)
RBC # BLD AUTO: 4.55 X10E6/UL (ref 3.77–5.28)
SODIUM SERPL-SCNC: 142 MMOL/L (ref 134–144)
TRIGL SERPL-MCNC: 88 MG/DL (ref 0–149)
TSH SERPL DL<=0.005 MIU/L-ACNC: 0.7 UIU/ML (ref 0.45–4.5)
VIT B12 SERPL-MCNC: 312 PG/ML (ref 232–1245)
VLDLC SERPL CALC-MCNC: 16 MG/DL (ref 5–40)
WBC # BLD AUTO: 4.8 X10E3/UL (ref 3.4–10.8)

## 2022-07-16 DIAGNOSIS — E03.9 PRIMARY HYPOTHYROIDISM: ICD-10-CM

## 2022-07-18 RX ORDER — LEVOTHYROXINE SODIUM 125 MCG
TABLET ORAL
Qty: 90 TABLET | Refills: 3 | Status: SHIPPED | OUTPATIENT
Start: 2022-07-18

## 2022-08-30 ENCOUNTER — TELEPHONE (OUTPATIENT)
Dept: FAMILY MEDICINE CLINIC | Facility: CLINIC | Age: 67
End: 2022-08-30

## 2022-08-30 ENCOUNTER — OFFICE VISIT (OUTPATIENT)
Dept: FAMILY MEDICINE CLINIC | Facility: CLINIC | Age: 67
End: 2022-08-30

## 2022-08-30 VITALS
OXYGEN SATURATION: 97 % | SYSTOLIC BLOOD PRESSURE: 131 MMHG | WEIGHT: 198.8 LBS | HEART RATE: 67 BPM | HEIGHT: 66 IN | BODY MASS INDEX: 31.95 KG/M2 | TEMPERATURE: 97.1 F | DIASTOLIC BLOOD PRESSURE: 83 MMHG

## 2022-08-30 DIAGNOSIS — E55.9 VITAMIN D DEFICIENCY: ICD-10-CM

## 2022-08-30 DIAGNOSIS — G47.33 OSA (OBSTRUCTIVE SLEEP APNEA): Primary | ICD-10-CM

## 2022-08-30 DIAGNOSIS — K21.9 GASTROESOPHAGEAL REFLUX DISEASE WITHOUT ESOPHAGITIS: ICD-10-CM

## 2022-08-30 DIAGNOSIS — E03.9 ADULT HYPOTHYROIDISM: ICD-10-CM

## 2022-08-30 DIAGNOSIS — E78.2 MIXED HYPERLIPIDEMIA: ICD-10-CM

## 2022-08-30 PROCEDURE — 99214 OFFICE O/P EST MOD 30 MIN: CPT | Performed by: FAMILY MEDICINE

## 2022-08-30 RX ORDER — CHOLECALCIFEROL (VITAMIN D3) 1250 MCG
CAPSULE ORAL
Qty: 12 CAPSULE | Refills: 3 | Status: SHIPPED | OUTPATIENT
Start: 2022-08-30

## 2022-08-30 NOTE — PROGRESS NOTES
Subjective   Oneida See is a 67 y.o. female.     Chief Complaint   Patient presents with   • Insomnia       History of Present Illness   Patient is following up on obstructive sleep apnea that she was diagnosed many years ago.  She is unfortunately not wearing her CPAP.  Needs to be retested.  Requests an appointment with sleep medicine physician.  Hyperlipidemia stable on current medications.  Hypothyroidism stable on current medications.  Vitamin D deficiency stable.  GERD stable.  Patient is not due for labs.    Her labs from last visit were discussed and reviewed in detail  The following portions of the patient's history were reviewed and updated as appropriate: allergies, current medications, past family history, past medical history, past social history, past surgical history and problem list.    Past Medical History:   Diagnosis Date   • Arthritis    • Contact dermatitis and eczema due to plant     Poison ivy   • Disease of thyroid gland    • Floaters in visual field, bilateral    • H/O degenerative disc disease    • Hemangioma of liver     CT 2017   • Hip pain, chronic, left    • History of concussion    • Human papilloma virus (HPV) infection 2013    Overview:  2013 pap negative, HPV positive, 16/18 negative 10/2014 pap negative and HPV negative   • Lower back pain    • Memory change    • Multiple polyps of sigmoid colon    • Neck pain    • PONV (postoperative nausea and vomiting)    • Sleep apnea     cpap       Past Surgical History:   Procedure Laterality Date   •  SECTION      Dr Mishar   • COLONOSCOPY     • COLONOSCOPY W/ POLYPECTOMY  2016    : 1 polyp   • OOPHORECTOMY Bilateral      Dr Meraz  uterus intact   • TOTAL HIP ARTHROPLASTY      Right  Dr Hollins Left  Dr Hollins   • TOTAL HIP ARTHROPLASTY REVISION Left 2021    Procedure: LEFT HIP ACETABULUM REVISION LEFT POSTERIOR APPROACH;  Surgeon: Juanito Hollins MD;  Location: [facility] OR;   Service: Orthopedics;  Laterality: Left;       Family History   Problem Relation Age of Onset   • Heart disease Father    • Breast cancer Maternal Aunt 70   • Malig Hyperthermia Neg Hx        Social History     Socioeconomic History   • Marital status:    Tobacco Use   • Smoking status: Former Smoker     Packs/day: 0.15     Years: 10.00     Pack years: 1.50     Types: Cigarettes     Quit date:      Years since quittin.6   • Smokeless tobacco: Never Used   • Tobacco comment: social    Vaping Use   • Vaping Use: Never used   Substance and Sexual Activity   • Alcohol use: Yes     Comment: occasionally   • Drug use: No   • Sexual activity: Defer       Current Outpatient Medications on File Prior to Visit   Medication Sig Dispense Refill   • Ascorbic Acid (VITAMIN C PO) Take 1 tablet by mouth Daily.     • Multiple Vitamins-Minerals (ZINC PO) Take 1 tablet by mouth Daily. HOLD FOR ONE WEEK PRIOR TO SURGERY     • Synthroid 125 MCG tablet TAKE 1 TABLET EVERY DAY 90 tablet 3   • Vitamin Mixture (VITAMIN E COMPLETE PO) Take 1 tablet by mouth Daily. Pt stated to hold 1 week prior to surgery     • [DISCONTINUED] Cholecalciferol (Vitamin D3) 1.25 MG (93292 UT) capsule Take once  Week. 12 capsule 3     No current facility-administered medications on file prior to visit.       Review of Systems   Respiratory: Positive for apnea.        Recent Results (from the past 4704 hour(s))   Vitamin D 25 Hydroxy    Collection Time: 22  8:25 AM    Specimen: Blood   Result Value Ref Range    25 Hydroxy, Vitamin D 35.1 30.0 - 100.0 ng/mL   TSH    Collection Time: 22  8:25 AM    Specimen: Blood   Result Value Ref Range    TSH 0.697 0.450 - 4.500 uIU/mL   Vitamin B12 & Folate    Collection Time: 22  8:25 AM    Specimen: Blood   Result Value Ref Range    Vitamin B-12 312 232 - 1,245 pg/mL    Folate 17.1 >3.0 ng/mL   Comprehensive Metabolic Panel    Collection Time: 22  8:25 AM    Specimen: Blood   Result  Value Ref Range    Glucose 89 65 - 99 mg/dL    BUN 20 8 - 27 mg/dL    Creatinine 0.86 0.57 - 1.00 mg/dL    EGFR Result 74 >59 mL/min/1.73    BUN/Creatinine Ratio 23 12 - 28    Sodium 142 134 - 144 mmol/L    Potassium 4.5 3.5 - 5.2 mmol/L    Chloride 106 96 - 106 mmol/L    Total CO2 22 20 - 29 mmol/L    Calcium 9.2 8.7 - 10.3 mg/dL    Total Protein 6.4 6.0 - 8.5 g/dL    Albumin 4.1 3.8 - 4.8 g/dL    Globulin 2.3 1.5 - 4.5 g/dL    A/G Ratio 1.8 1.2 - 2.2    Total Bilirubin 0.6 0.0 - 1.2 mg/dL    Alkaline Phosphatase 87 44 - 121 IU/L    AST (SGOT) 11 0 - 40 IU/L    ALT (SGPT) 12 0 - 32 IU/L   Lipid Panel    Collection Time: 06/16/22  8:25 AM    Specimen: Blood   Result Value Ref Range    Total Cholesterol 220 (H) 100 - 199 mg/dL    Triglycerides 88 0 - 149 mg/dL    HDL Cholesterol 61 >39 mg/dL    VLDL Cholesterol Jalen 16 5 - 40 mg/dL    LDL Chol Calc (NIH) 143 (H) 0 - 99 mg/dL   CBC & Differential    Collection Time: 06/16/22  8:25 AM    Specimen: Blood   Result Value Ref Range    WBC 4.8 3.4 - 10.8 x10E3/uL    RBC 4.55 3.77 - 5.28 x10E6/uL    Hemoglobin 14.1 11.1 - 15.9 g/dL    Hematocrit 42.1 34.0 - 46.6 %    MCV 93 79 - 97 fL    MCH 31.0 26.6 - 33.0 pg    MCHC 33.5 31.5 - 35.7 g/dL    RDW 12.7 11.7 - 15.4 %    Platelets 294 150 - 450 x10E3/uL    Neutrophil Rel % 57 Not Estab. %    Lymphocyte Rel % 31 Not Estab. %    Monocyte Rel % 9 Not Estab. %    Eosinophil Rel % 2 Not Estab. %    Basophil Rel % 1 Not Estab. %    Neutrophils Absolute 2.7 1.4 - 7.0 x10E3/uL    Lymphocytes Absolute 1.5 0.7 - 3.1 x10E3/uL    Monocytes Absolute 0.5 0.1 - 0.9 x10E3/uL    Eosinophils Absolute 0.1 0.0 - 0.4 x10E3/uL    Basophils Absolute 0.1 0.0 - 0.2 x10E3/uL    Immature Granulocyte Rel % 0 Not Estab. %    Immature Grans Absolute 0.0 0.0 - 0.1 x10E3/uL   POCT urinalysis dipstick, automated    Collection Time: 06/16/22  9:43 AM    Specimen: Urine   Result Value Ref Range    Color Yellow Yellow, Straw, Dark Yellow, Lilli    Clarity, UA  "Clear Clear    Specific Gravity  1.030 1.005 - 1.030    pH, Urine 5.5 5.0 - 8.0    Leukocytes Negative Negative    Nitrite, UA Negative Negative    Protein, POC Negative Negative mg/dL    Glucose, UA Negative Negative, 1000 mg/dL (3+) mg/dL    Ketones, UA Negative Negative    Urobilinogen, UA Normal Normal    Bilirubin Small (1+) (A) Negative    Blood, UA 2+ (A) Negative    Lot Number [id number]     Expiration Date 12/17/23      Objective   Vitals:    08/30/22 0726   BP: 131/83   BP Location: Right arm   Patient Position: Sitting   Pulse: 67   Temp: 97.1 °F (36.2 °C)   SpO2: 97%   Weight: 90.2 kg (198 lb 12.8 oz)   Height: 167.6 cm (65.98\")     Body mass index is 32.1 kg/m².  Physical Exam  Vitals and nursing note reviewed.   Constitutional:       General: She is not in acute distress.     Appearance: She is well-developed. She is not diaphoretic.   Cardiovascular:      Rate and Rhythm: Normal rate and regular rhythm.   Pulmonary:      Effort: Pulmonary effort is normal. No respiratory distress.      Breath sounds: Normal breath sounds. No wheezing.           Diagnoses and all orders for this visit:    1. DUSTIN (obstructive sleep apnea) (Primary)  -     Ambulatory Referral to Sleep Medicine    2. Mixed hyperlipidemia    3. Adult hypothyroidism    4. Vitamin D deficiency  -     Cholecalciferol (Vitamin D3) 1.25 MG (39293 UT) capsule; Take once  Week.  Dispense: 12 capsule; Refill: 3    5. Gastroesophageal reflux disease without esophagitis    Continue all medications for stable chronic medical conditions as above  Labs up-to-date not due for labs.  Return in about 6 months (around 2/28/2023).          "

## 2022-09-23 ENCOUNTER — LAB (OUTPATIENT)
Dept: LAB | Facility: HOSPITAL | Age: 67
End: 2022-09-23

## 2022-09-23 ENCOUNTER — TRANSCRIBE ORDERS (OUTPATIENT)
Dept: ADMINISTRATIVE | Facility: HOSPITAL | Age: 67
End: 2022-09-23

## 2022-09-23 DIAGNOSIS — T56.94XA METALLOSIS, UNDETERMINED INTENT, INITIAL ENCOUNTER: ICD-10-CM

## 2022-09-23 DIAGNOSIS — T56.94XA METALLOSIS, UNDETERMINED INTENT, INITIAL ENCOUNTER: Primary | ICD-10-CM

## 2022-09-23 PROCEDURE — 82495 ASSAY OF CHROMIUM: CPT

## 2022-09-23 PROCEDURE — 36415 COLL VENOUS BLD VENIPUNCTURE: CPT

## 2022-09-23 PROCEDURE — 83018 HEAVY METAL QUAN EACH NES: CPT

## 2022-09-27 LAB — COBALT SERPL-MCNC: 1.4 UG/L (ref 0–0.9)

## 2022-09-29 LAB — CR SERPL-MCNC: 3.4 UG/L (ref 0.1–2.1)

## 2022-12-29 ENCOUNTER — APPOINTMENT (OUTPATIENT)
Dept: BONE DENSITY | Facility: HOSPITAL | Age: 67
End: 2022-12-29

## 2023-04-25 ENCOUNTER — TELEPHONE (OUTPATIENT)
Dept: FAMILY MEDICINE CLINIC | Facility: CLINIC | Age: 68
End: 2023-04-25
Payer: MEDICARE

## 2023-04-25 NOTE — TELEPHONE ENCOUNTER
Patient called to get a referral. I informed patient that she was no longer a patient here because she has an appointment to re-establish with Halle Walton at [facility]. Instructed patient to contact her office in regards to the referral she requested.

## 2023-05-24 ENCOUNTER — OFFICE VISIT (OUTPATIENT)
Dept: FAMILY MEDICINE CLINIC | Facility: CLINIC | Age: 68
End: 2023-05-24
Payer: MEDICARE

## 2023-05-24 VITALS
HEART RATE: 65 BPM | TEMPERATURE: 97.5 F | DIASTOLIC BLOOD PRESSURE: 82 MMHG | OXYGEN SATURATION: 100 % | SYSTOLIC BLOOD PRESSURE: 110 MMHG | BODY MASS INDEX: 31.18 KG/M2 | WEIGHT: 194 LBS | RESPIRATION RATE: 16 BRPM | HEIGHT: 66 IN

## 2023-05-24 DIAGNOSIS — E78.49 OTHER HYPERLIPIDEMIA: ICD-10-CM

## 2023-05-24 DIAGNOSIS — E55.9 VITAMIN D DEFICIENCY: ICD-10-CM

## 2023-05-24 DIAGNOSIS — E03.9 ADULT HYPOTHYROIDISM: Primary | ICD-10-CM

## 2023-05-24 NOTE — PROGRESS NOTES
"Chief Complaint  Establish Care, Hypothyroidism, Vitamin D Deficiency, and Hyperlipidemia    Subjective          History of Present Illness    Oneida See 68 y.o. female presents today for a new patient appointment.  She is here to establish care and is a new patient to me.  I reviewed the PFSH recorded today by my MA/LPN staff.       The patient has hypothyroidism.  She takes Synthroid 125 mcg daily.  She is been taking this medication since she was 17 years old.  She tolerates the medication well with no side effects.  She is compliant with taking the medication daily.  She does not need medication refills today.  TSH was normal in June 2022.  Will order updated thyroid labs.  The patient is asymptomatic today.    She has vitamin D deficiency.  She takes vitamin D3 50,000 units once weekly.  She tolerates the medication well with no side effects. She does not need medication refills today.  Will order vitamin D level today.  Vitamin D was at the low end of normal at 35.1 in June 2022.      She has hyperlipidemia.  Last lipid panel was June 2022 that revealed elevated total cholesterol at 220, normal triglycerides at 88, normal HDL at 61, and elevated LDL at 143.  She is not currently taking any cholesterol medication.  Will order updated lipid panel today.          Objective   Vital Signs:   /82 (BP Location: Left arm, Patient Position: Sitting, Cuff Size: Adult)   Pulse 65   Temp 97.5 °F (36.4 °C) (Oral)   Resp 16   Ht 167.6 cm (65.98\")   Wt 88 kg (194 lb)   SpO2 100%   BMI 31.33 kg/m²      BMI is >= 30 and <35. (Class 1 Obesity). The following options were offered after discussion;: exercise counseling/recommendations and nutrition counseling/recommendations        Physical Exam  Vitals and nursing note reviewed.   Constitutional:       Appearance: Normal appearance. She is well-developed. She is obese. She is not toxic-appearing.   HENT:      Head: Normocephalic.      Right Ear: External ear " normal.      Left Ear: External ear normal.   Eyes:      General: No scleral icterus.     Pupils: Pupils are equal, round, and reactive to light.   Neck:      Thyroid: No thyromegaly.   Cardiovascular:      Rate and Rhythm: Normal rate and regular rhythm.      Heart sounds: Normal heart sounds.   Pulmonary:      Effort: Pulmonary effort is normal. No respiratory distress.      Breath sounds: Normal breath sounds. No stridor.   Musculoskeletal:         General: No deformity.   Skin:     General: Skin is warm.      Coloration: Skin is not jaundiced.   Neurological:      General: No focal deficit present.      Mental Status: She is alert and oriented to person, place, and time.   Psychiatric:         Behavior: Behavior normal.         Thought Content: Thought content normal.         Judgment: Judgment normal.          The following data was reviewed by: KANCHAN Marvin on 05/24/2023:  TSH (06/16/2022 08:25)  Lipid Panel (06/16/2022 08:25)  Vitamin D 25 Hydroxy (06/16/2022 08:25)               Assessment and Plan      Diagnoses and all orders for this visit:    1. Adult hypothyroidism (Primary)  Comments:  New patient  Continue Synthroid 125 mcg daily  Thyroid labs today  Follow-up in 6 months  Orders:  -     Comprehensive metabolic panel  -     CBC and Differential  -     TSH  -     T4, Free    2. Other hyperlipidemia  Comments:  New patient  Previously diet controlled  Lipid panel today  Follow-up in 6 months  Orders:  -     Comprehensive metabolic panel  -     Lipid panel  -     CBC and Differential    3. Vitamin D deficiency  Comments:  New patient  Continue vitamin D 50,000 units weekly  Vitamin D level today  Follow-up in 6 months  Orders:  -     Vitamin D,25-Hydroxy            Follow Up     Return in about 6 months (around 11/24/2023) for Next scheduled follow up.    Patient was given instructions and counseling regarding her condition or for health maintenance advice. Please see specific information pulled  into the AVS if appropriate.     -Follow-up in 6 months.

## 2023-07-26 ENCOUNTER — OFFICE VISIT (OUTPATIENT)
Dept: FAMILY MEDICINE CLINIC | Facility: CLINIC | Age: 68
End: 2023-07-26
Payer: MEDICARE

## 2023-07-26 VITALS — BODY MASS INDEX: 31.18 KG/M2 | WEIGHT: 194 LBS | TEMPERATURE: 98 F | HEIGHT: 66 IN | RESPIRATION RATE: 16 BRPM

## 2023-07-26 DIAGNOSIS — M25.361 PATELLOFEMORAL INSTABILITY OF RIGHT KNEE WITH PAIN: ICD-10-CM

## 2023-07-26 DIAGNOSIS — G89.29 CHRONIC NECK PAIN: ICD-10-CM

## 2023-07-26 DIAGNOSIS — E53.8 LOW VITAMIN B12 LEVEL: ICD-10-CM

## 2023-07-26 DIAGNOSIS — M54.2 CHRONIC NECK PAIN: ICD-10-CM

## 2023-07-26 DIAGNOSIS — M54.50 LUMBAR BACK PAIN: ICD-10-CM

## 2023-07-26 DIAGNOSIS — R10.9 SIDE PAIN: Primary | ICD-10-CM

## 2023-07-26 DIAGNOSIS — M25.561 PATELLOFEMORAL INSTABILITY OF RIGHT KNEE WITH PAIN: ICD-10-CM

## 2023-07-26 DIAGNOSIS — E78.2 MIXED HYPERLIPIDEMIA: ICD-10-CM

## 2023-07-26 LAB
BILIRUB BLD-MCNC: NEGATIVE MG/DL
CLARITY, POC: CLEAR
COLOR UR: YELLOW
EXPIRATION DATE: ABNORMAL
GLUCOSE UR STRIP-MCNC: NEGATIVE MG/DL
KETONES UR QL: NEGATIVE
LEUKOCYTE EST, POC: NEGATIVE
Lab: ABNORMAL
NITRITE UR-MCNC: NEGATIVE MG/ML
PH UR: 6 [PH] (ref 5–8)
PROT UR STRIP-MCNC: NEGATIVE MG/DL
RBC # UR STRIP: ABNORMAL /UL
SP GR UR: 1.02 (ref 1–1.03)
UROBILINOGEN UR QL: NORMAL

## 2023-07-26 PROCEDURE — 81003 URINALYSIS AUTO W/O SCOPE: CPT | Performed by: NURSE PRACTITIONER

## 2023-07-26 PROCEDURE — 99214 OFFICE O/P EST MOD 30 MIN: CPT | Performed by: NURSE PRACTITIONER

## 2023-07-26 NOTE — PROGRESS NOTES
Chief Complaint  Knee Injury and Flank Pain    Jimmy Mohamud presents to [facility] as a 68 year old female to follow up and for referrals placed.  Overall doing well    History of low back and neck pain-  worsening.  Would like referral back to physical therapy.  Pain is worse with sudden head movements, or repetitive bending, lifting or twisting.  She denies any fever, chills, pain going down your legs,  loss of bladder or bowel function, weakness or numbness in arms or legs, nausea, vomiting, abdominal pain, or syncope.    Right flank pain this week.  NO dysuria, or increased frequency or urgency.  Has had blood small amount in urine in the the past.  No changes     She has started dancing again recently and is having some right knee pain and instability.  She has gotten a brace to wear and feels this has improved her symptoms.  She denies any swelling or acute pain today.  She declined referral to ortho at this time.  Will continue to wear supportive brace when dancing or increased activities and plans to follow up with any increased/changes in symptoms    She has had some increase size of stool recently.  She has no rectal pain or bleeding, no dark tarry stools.  She has a history of hypothyroidism-  takes levothyroxine as directed.  No reported missed doses    She has an appointment for labs prior to her medicare wellness visit this week  She has no other acute C/o today    The following portions of the patient's history were reviewed and updated as appropriate: allergies, current medications, past family history, past medical history, past social history, past surgical history, and problem list                 Review of Systems   Constitutional:  Negative for chills, fatigue and fever.   Eyes:  Negative for visual disturbance.   Respiratory:  Negative for cough, shortness of breath and wheezing.    Cardiovascular:  Negative for chest pain, palpitations and leg  "swelling.   Gastrointestinal:  Negative for abdominal pain, diarrhea, nausea and vomiting.   Musculoskeletal:  Negative for back pain.   Skin:  Negative for rash.   Neurological:  Negative for dizziness and light-headedness.      Objective   Vital Signs:   Vitals:    07/26/23 1016   Resp: 16   Temp: 98 °F (36.7 °C)   TempSrc: Skin   Weight: 88 kg (194 lb)   Height: 167.6 cm (65.98\")                  Physical Exam  Vitals reviewed.   Constitutional:       General: She is not in acute distress.  Eyes:      Conjunctiva/sclera: Conjunctivae normal.   Neck:      Thyroid: No thyromegaly.      Vascular: No carotid bruit.     Cardiovascular:      Rate and Rhythm: Normal rate and regular rhythm.      Heart sounds: Normal heart sounds.   Pulmonary:      Effort: Pulmonary effort is normal. No respiratory distress.      Breath sounds: Normal breath sounds. No stridor. No wheezing, rhonchi or rales.   Chest:      Chest wall: No tenderness.   Musculoskeletal:      Cervical back: Neck supple. No edema, erythema, signs of trauma, rigidity, torticollis or crepitus. No pain with movement, spinous process tenderness or muscular tenderness. Decreased range of motion.      Right knee: No swelling, deformity, effusion, erythema, ecchymosis, lacerations, bony tenderness or crepitus. Normal range of motion. No tenderness. Abnormal patellar mobility. Normal alignment and normal meniscus. Normal pulse.        Legs:    Lymphadenopathy:      Cervical: No cervical adenopathy.   Neurological:      Mental Status: She is alert.   Psychiatric:         Attention and Perception: Attention normal.         Mood and Affect: Mood normal.        Result Review :     The following data was reviewed by: KANCHAN Knight on 07/26/2023:  MicroAlbumin, Urine, Random - Urine, Clean Catch (07/26/2023 10:55)-pending  Vitamin B12 (07/26/2023 10:55) pending  Urine Culture - Urine, Urine, Clean Catch (07/26/2023 10:29) pending  U dip in office today small blood " otherwise negative-we will send for culture      Vitamin D 25 Hydroxy (06/16/2022 08:25)  TSH (06/16/2022 08:25)  Vitamin B12 & Folate (06/16/2022 08:25)  Comprehensive Metabolic Panel (06/16/2022 08:25)  Lipid Panel (06/16/2022 08:25)  CBC & Differential (06/16/2022 08:25)   Your cholesterol and LDL, bad cholesterol, are high.  Work on diet and exercise and recheck in the near future.  All your other labs are stable.   Assessment and Plan    Diagnoses and all orders for this visit:    1. Side pain (Primary)  Comments:  Follow-up with any worsening symptoms or no improvements  Referral placed to PT  Orders:  -     Urine Culture - Urine, Urine, Clean Catch  -     POC Urinalysis Dipstick, Automated    2. Lumbar back pain  Comments:  Referral placed to PT  Follow-up with any worsening symptoms or no improvement  Orders:  -     Ambulatory Referral to Physical Therapy Evaluate and treat    3. Chronic neck pain  Comments:  Referral placed to PT follow-up with any worsening symptoms or no improvements  Orders:  -     Ambulatory Referral to Physical Therapy Evaluate and treat    4. Low vitamin B12 level  Comments:  Added vitamin B level to labs planned for this week  Orders:  -     Vitamin B12    5. Mixed hyperlipidemia  Comments:  Continue to work on lower cholesterol diet and exercise.  Goal is greater than 150 minutes moderate intensity weekly  Orders:  -     MicroAlbumin, Urine, Random - Urine, Clean Catch    6. Patellofemoral instability of right knee with pain  Comments:  Referral placed to PT-continue to wear support brace  Follow-up with any worsening symptoms or no improvements  Orders:  -     Ambulatory Referral to Physical Therapy Evaluate and treat    Plan is to try physical therapy-we will refer to Ortho if no improvements with neck knee and back  Continue to wear support brace  Labs planned this week  Follow-up for Medicare wellness    Follow Up   Return in about 1 month (around 8/26/2023) for Keep follow-up  as directed, Medicare Wellness.  Patient was given instructions and counseling regarding her condition or for health maintenance advice. Please see specific information pulled into the AVS if appropriate.

## 2023-07-27 DIAGNOSIS — E03.9 PRIMARY HYPOTHYROIDISM: ICD-10-CM

## 2023-07-27 RX ORDER — LEVOTHYROXINE SODIUM 125 MCG
125 TABLET ORAL DAILY
Qty: 90 TABLET | Refills: 3 | OUTPATIENT
Start: 2023-07-27

## 2023-07-27 NOTE — TELEPHONE ENCOUNTER
Caller: Mayi Oneida    Relationship: Self    Best call back number: [phone]     Requested Prescriptions:   Requested Prescriptions     Pending Prescriptions Disp Refills    Synthroid 125 MCG tablet 90 tablet 3     Sig: Take 1 tablet by mouth Daily.        Pharmacy where request should be sent: [facility] - [facility] [address] - [phone]  - [phone] FX     Last office visit with prescribing clinician: 5/24/2023   Last telemedicine visit with prescribing clinician: Visit date not found   Next office visit with prescribing clinician: 11/29/2023     Additional details provided by patient: NA    Does the patient have less than a 3 day supply:  [] Yes  [x] No    Would you like a call back once the refill request has been completed: [] Yes [x] No    If the office needs to give you a call back, can they leave a voicemail: [] Yes [x] No    Rossana Sevilla Rep   07/27/23 08:54 EDT

## 2023-07-27 NOTE — TELEPHONE ENCOUNTER
Rx Refill Note  Requested Prescriptions     Pending Prescriptions Disp Refills    Synthroid 125 MCG tablet 90 tablet 3     Sig: Take 1 tablet by mouth Daily.      Last office visit with prescribing clinician: 5/24/2023   Last telemedicine visit with prescribing clinician: Visit date not found   Next office visit with prescribing clinician: 11/29/2023

## 2023-07-28 LAB
UNABLE TO VOID: NORMAL
VIT B12 SERPL-MCNC: 306 PG/ML (ref 211–946)

## 2023-07-28 NOTE — PROGRESS NOTES
Good morning Oneida, I received your lab results from this morning, but the only result was for the Vitamin B-12-  which was normal-  It is possible that the labs ordered by Halle for your routine labs did not get completed.  I will see if any can be added on, and we will let you know if you need to come back in.    Sorry for that inconvenience and I will keep a look out for your other lab results.  Opal

## 2023-07-30 LAB
BACTERIA UR CULT: ABNORMAL
BACTERIA UR CULT: ABNORMAL
Lab: NORMAL
OTHER ANTIBIOTIC SUSC ISLT: ABNORMAL

## 2023-07-31 DIAGNOSIS — N30.01 ACUTE CYSTITIS WITH HEMATURIA: Primary | ICD-10-CM

## 2023-07-31 RX ORDER — NITROFURANTOIN 25; 75 MG/1; MG/1
100 CAPSULE ORAL 2 TIMES DAILY
Qty: 10 CAPSULE | Refills: 0 | Status: SHIPPED | OUTPATIENT
Start: 2023-07-31 | End: 2023-08-05

## 2023-08-04 DIAGNOSIS — E03.9 PRIMARY HYPOTHYROIDISM: ICD-10-CM

## 2023-08-04 RX ORDER — LEVOTHYROXINE SODIUM 125 MCG
125 TABLET ORAL DAILY
Qty: 15 TABLET | Refills: 0 | Status: SHIPPED | OUTPATIENT
Start: 2023-08-04

## 2023-08-22 ENCOUNTER — TELEPHONE (OUTPATIENT)
Dept: FAMILY MEDICINE CLINIC | Facility: CLINIC | Age: 68
End: 2023-08-22

## 2023-08-22 ENCOUNTER — TELEPHONE (OUTPATIENT)
Dept: FAMILY MEDICINE CLINIC | Facility: CLINIC | Age: 68
End: 2023-08-22
Payer: MEDICARE

## 2023-08-22 DIAGNOSIS — E03.9 PRIMARY HYPOTHYROIDISM: ICD-10-CM

## 2023-08-22 RX ORDER — LEVOTHYROXINE SODIUM 125 MCG
125 TABLET ORAL DAILY
Qty: 15 TABLET | Refills: 0 | OUTPATIENT
Start: 2023-08-22

## 2023-08-22 RX ORDER — LEVOTHYROXINE SODIUM 125 MCG
TABLET ORAL
Qty: 15 TABLET | Refills: 0 | Status: SHIPPED | OUTPATIENT
Start: 2023-08-22

## 2023-08-22 NOTE — TELEPHONE ENCOUNTER
Rx Refill Note  Requested Prescriptions     Pending Prescriptions Disp Refills    Synthroid 125 MCG tablet 15 tablet 0     Sig: Take 1 tablet by mouth Daily.      Last office visit with prescribing clinician: 5/24/2023   Last telemedicine visit with prescribing clinician: Visit date not found   Next office visit with prescribing clinician: 8/22/2023

## 2023-08-22 NOTE — TELEPHONE ENCOUNTER
Rx Refill Note  Requested Prescriptions     Pending Prescriptions Disp Refills    Synthroid 125 MCG tablet [Pharmacy Med Name: SYNTHROID 125 MCG Tablet] 15 tablet 0     Sig: TAKE 1 TABLET EVERY DAY (NEED MD APPOINTMENT)      Last office visit with prescribing clinician: 5/24/2023   Last telemedicine visit with prescribing clinician: Visit date not found   Next office visit with prescribing clinician: 11/29/2023

## 2023-08-22 NOTE — TELEPHONE ENCOUNTER
Caller: Oneida See    Relationship: Self    Best call back number: [phone]    Requested Prescriptions:   Requested Prescriptions     Pending Prescriptions Disp Refills    Synthroid 125 MCG tablet 15 tablet 0     Sig: Take 1 tablet by mouth Daily.        Pharmacy where request should be sent: [facility] - [facility] [address] - [phone] PH - [phone] FX     Last office visit with prescribing clinician: 5/24/2023   Last telemedicine visit with prescribing clinician: Visit date not found   Next office visit with prescribing clinician: 11/29/2023     Additional details provided by patient: PATIENT STATES THAT SHE SPOKE WITH HER PHARMACY TODAY AND THEY STILL HAVE NOT RECEIVED THIS MEDICATION REQUEST. PATIENT STATES THAT THEY HAVE SENT TWO FAXES TO THE OFFICE, AND HAVE NOT HEARD ANYTHING. PLEASE SEND THIS ASAP.     Does the patient have less than a 3 day supply:  [x] Yes  [] No    Would you like a call back once the refill request has been completed: [] Yes [x] No    If the office needs to give you a call back, can they leave a voicemail: [] Yes [x] No    Rossana Bowles Rep   08/22/23 09:22 EDT

## 2023-08-22 NOTE — TELEPHONE ENCOUNTER
Caller: Oneida See    Relationship to patient: Self    Best call back number: [phone]    Patient is needing: PATIENT IS CALLING TO SCHEDULE HER LABS. PLEASE CALL PATIENT BACK AS SOON AS POSSIBLE.

## 2023-08-23 DIAGNOSIS — E03.9 PRIMARY HYPOTHYROIDISM: ICD-10-CM

## 2023-08-23 NOTE — TELEPHONE ENCOUNTER
Caller: Oneida See    Relationship: Self    Best call back number:   [phone]  Requested Prescriptions:   Requested Prescriptions     Pending Prescriptions Disp Refills    Synthroid 125 MCG tablet 15 tablet 0     Sig: Take 1 tablet by mouth Daily.        Pharmacy where request should be sent: [facility] MAIL DELIV[facility] - [facility] [address] - [phone] PH - [phone] FX     Last office visit with prescribing clinician: 5/24/2023   Last telemedicine visit with prescribing clinician: Visit date not found   Next office visit with prescribing clinician: 11/29/2023     Additional details provided by patient: PATIENT STATED SHE HAS BEEN SEEN A COUPLE OF TIMES IN THE LAST COUPLE OF MONTHS.    PATIENT IS REQUESTING A 90 DAY SUPPLY AS IT COSTS THE SAME WITH HER INSURANCE.    Does the patient have less than a 3 day supply:  [] Yes  [x] No    Would you like a call back once the refill request has been completed: [] Yes [x] No    If the office needs to give you a call back, can they leave a voicemail: [] Yes [x] No    Rossana Rodriguez Rep   08/23/23 09:20 EDT

## 2023-08-30 LAB
25(OH)D3+25(OH)D2 SERPL-MCNC: 47.2 NG/ML (ref 30–100)
ALBUMIN SERPL-MCNC: 4.3 G/DL (ref 3.5–5.2)
ALBUMIN/GLOB SERPL: 1.8 G/DL
ALP SERPL-CCNC: 83 U/L (ref 39–117)
ALT SERPL-CCNC: 7 U/L (ref 1–33)
AST SERPL-CCNC: 13 U/L (ref 1–32)
BASOPHILS # BLD AUTO: 0.02 10*3/MM3 (ref 0–0.2)
BASOPHILS NFR BLD AUTO: 0.4 % (ref 0–1.5)
BILIRUB SERPL-MCNC: 0.6 MG/DL (ref 0–1.2)
BUN SERPL-MCNC: 14 MG/DL (ref 8–23)
BUN/CREAT SERPL: 19.2 (ref 7–25)
CALCIUM SERPL-MCNC: 9.4 MG/DL (ref 8.6–10.5)
CHLORIDE SERPL-SCNC: 105 MMOL/L (ref 98–107)
CHOLEST SERPL-MCNC: 229 MG/DL (ref 0–200)
CO2 SERPL-SCNC: 27.3 MMOL/L (ref 22–29)
CREAT SERPL-MCNC: 0.73 MG/DL (ref 0.57–1)
EGFRCR SERPLBLD CKD-EPI 2021: 89.2 ML/MIN/1.73
EOSINOPHIL # BLD AUTO: 0.13 10*3/MM3 (ref 0–0.4)
EOSINOPHIL NFR BLD AUTO: 2.8 % (ref 0.3–6.2)
ERYTHROCYTE [DISTWIDTH] IN BLOOD BY AUTOMATED COUNT: 12.5 % (ref 12.3–15.4)
GLOBULIN SER CALC-MCNC: 2.4 GM/DL
GLUCOSE SERPL-MCNC: 92 MG/DL (ref 65–99)
HCT VFR BLD AUTO: 42.6 % (ref 34–46.6)
HDLC SERPL-MCNC: 67 MG/DL (ref 40–60)
HGB BLD-MCNC: 14.5 G/DL (ref 12–15.9)
IMM GRANULOCYTES # BLD AUTO: 0.02 10*3/MM3 (ref 0–0.05)
IMM GRANULOCYTES NFR BLD AUTO: 0.4 % (ref 0–0.5)
LDLC SERPL CALC-MCNC: 145 MG/DL (ref 0–100)
LYMPHOCYTES # BLD AUTO: 1.4 10*3/MM3 (ref 0.7–3.1)
LYMPHOCYTES NFR BLD AUTO: 29.7 % (ref 19.6–45.3)
MCH RBC QN AUTO: 31 PG (ref 26.6–33)
MCHC RBC AUTO-ENTMCNC: 34 G/DL (ref 31.5–35.7)
MCV RBC AUTO: 91.2 FL (ref 79–97)
MONOCYTES # BLD AUTO: 0.38 10*3/MM3 (ref 0.1–0.9)
MONOCYTES NFR BLD AUTO: 8.1 % (ref 5–12)
NEUTROPHILS # BLD AUTO: 2.77 10*3/MM3 (ref 1.7–7)
NEUTROPHILS NFR BLD AUTO: 58.6 % (ref 42.7–76)
NRBC BLD AUTO-RTO: 0 /100 WBC (ref 0–0.2)
PLATELET # BLD AUTO: 310 10*3/MM3 (ref 140–450)
POTASSIUM SERPL-SCNC: 4.5 MMOL/L (ref 3.5–5.2)
PROT SERPL-MCNC: 6.7 G/DL (ref 6–8.5)
RBC # BLD AUTO: 4.67 10*6/MM3 (ref 3.77–5.28)
SODIUM SERPL-SCNC: 143 MMOL/L (ref 136–145)
T4 FREE SERPL-MCNC: 1.36 NG/DL (ref 0.93–1.7)
TRIGL SERPL-MCNC: 97 MG/DL (ref 0–150)
TSH SERPL DL<=0.005 MIU/L-ACNC: 2.27 UIU/ML (ref 0.27–4.2)
VLDLC SERPL CALC-MCNC: 17 MG/DL (ref 5–40)
WBC # BLD AUTO: 4.72 10*3/MM3 (ref 3.4–10.8)

## 2023-09-07 ENCOUNTER — TELEPHONE (OUTPATIENT)
Dept: FAMILY MEDICINE CLINIC | Facility: CLINIC | Age: 68
End: 2023-09-07
Payer: MEDICARE

## 2023-09-07 NOTE — TELEPHONE ENCOUNTER
----- Message from KANCHAN Marvin sent at 9/5/2023  5:48 PM EDT -----  Total and LDL cholesterol are elevated.  10-year ASCVD risk score is 6.4%, so it is not recommended to start cholesterol medication.  She does need to work on a low-cholesterol diet and daily exercise to improve her numbers.      Fasting blood sugar, kidney function, and liver function are all normal.  CBC is normal with no signs of anemia.  Thyroid labs are normal.  No changes to thyroid medication at this time.  Vitamin D level is normal.            Lvm for pt okay for hub to relay message

## 2023-09-08 RX ORDER — LEVOTHYROXINE SODIUM 125 MCG
125 TABLET ORAL DAILY
Qty: 90 TABLET | Refills: 0 | Status: SHIPPED | OUTPATIENT
Start: 2023-09-08

## 2023-09-08 NOTE — TELEPHONE ENCOUNTER
PATIENT IS CALLING TO CHECK THE STATUS OF A REQUEST FOR A REFILL FOR THE FOLLOWING MEDICATION.  SHE STATES SHE HAS CALLED SEVERAL TIMES AND WILL BE OUT IN A COUPLE OF DAYS.  SHE STATES IT NEEDS TO BE NAME BRAND.    Synthroid 125 MCG tablet     [facility] - [place], [place] - [address] - [phone]  - [phone]     PLEASE ADVISE.

## 2023-09-13 ENCOUNTER — TELEPHONE (OUTPATIENT)
Dept: FAMILY MEDICINE CLINIC | Facility: CLINIC | Age: 68
End: 2023-09-13

## 2023-09-13 NOTE — TELEPHONE ENCOUNTER
PATIENT CALLED AND WANTED TO REMIND MORENA CONLEY THAT SHE HAS A CHRONIC NECK PAIN AND HAS CONTINUOUS PHYSICAL THERAPY REFERRAL DUE TO [employer].   SHE STATES RISK MANAGEMENT MAY CALL TO VERIFY.    CALL BACK NUMBER [phone]    [facility]

## 2023-09-18 ENCOUNTER — TELEPHONE (OUTPATIENT)
Dept: FAMILY MEDICINE CLINIC | Facility: CLINIC | Age: 68
End: 2023-09-18

## 2023-09-18 NOTE — TELEPHONE ENCOUNTER
Caller: Oneida See    Relationship: Self    Best call back number: [phone]     Who are you requesting to speak with (clinical staff, provider,  specific staff member): FINESSE HARRINGTON    What was the call regarding: PATIENT STATED SHE IS DOING PHYSICAL THERAPY AT [facility] FOR AN ONGOING CHRONIC NECK INJURY FROM A FALL AT WORK. PATIENT STATED THEY NEED SOMETHING TO BE CLARIFIED IN ORDER FOR IT TO BE PAID FOR. PATIENT STATED THAT WHAT THEY ARE NEEDING IS THE REFERRAL, WHICH THEY SHOULD ALREADY HAVE, AND THE LAST OFFICE NOTES REGARDING HER NECK, WHICH SHOW, FOR EXAMPLE, THAT HER NECK IS LOCKED. PLEASE ADVISE.     RISK MANAGEMENT FAX: [phone]

## 2023-09-18 NOTE — TELEPHONE ENCOUNTER
Caller: Oneida See    Relationship: Self    Best call back number: [phone]     What was the call regarding: PATIENT STATED THAT SHE SPOKE WITH [employer] AND THEY ADVISED THAT [facility] AND THAT SHE IS NOT INCLUDED IN THE GROUP THAT WILL NOT BE COVERED THROUGH [employer]. PLEASE ADVISE.

## 2023-09-28 ENCOUNTER — TELEPHONE (OUTPATIENT)
Dept: FAMILY MEDICINE CLINIC | Facility: CLINIC | Age: 68
End: 2023-09-28

## 2023-09-28 NOTE — TELEPHONE ENCOUNTER
Caller: Oneida See    Relationship: Self    Best call back number: [phone]    What is the medical concern/diagnosis: N/A     What specialty or service is being requested: RISK MANAGEMENT     What is the provider, practice or medical service name: N/A     What is the office location: N/A     What is the office phone number: N/A     Any additional details: PATIENT WOULD LIKE TO KNOW IF PAPERWORK WAS SENT OVER TO RISK MANAGEMENT. PLEASE CONFIRM. PATIENT STATED SHE IS GOING OUT OF TOWN FOR 1 WEEK ON 9/29/23.  PLEASE CALL. THANKS

## 2023-10-11 ENCOUNTER — TELEPHONE (OUTPATIENT)
Dept: FAMILY MEDICINE CLINIC | Facility: CLINIC | Age: 68
End: 2023-10-11

## 2023-10-11 NOTE — TELEPHONE ENCOUNTER
Caller: Oneida See    Relationship: Self    Best call back number: [phone]    Caller requesting test results: LABS WITH VITAMIN D     What test was performed: LABS     When was the test performed: N/A     Where was the test performed: N/A    Additional notes: PATIENT STATE TO PLEASE MAIL HER THE MOST CURRENT LAB RESULTS WITH VITAMIN D . THANKS

## 2023-10-26 ENCOUNTER — TELEPHONE (OUTPATIENT)
Dept: FAMILY MEDICINE CLINIC | Facility: CLINIC | Age: 68
End: 2023-10-26

## 2023-10-26 NOTE — TELEPHONE ENCOUNTER
Caller: Oneida See    Relationship: Self    Best call back number: [phone]    What is the best time to reach you: ANY    Who are you requesting to speak with (clinical staff, provider,  specific staff member): CLINICAL    Do you know the name of the person who called: PATIENT    What was the call regarding: CHRONIC NECK PAIN AND REFERRAL.  PATIENT REQUESTING TO SPEAK WITH FINESSE    Is it okay if the provider responds through MyChart: NO

## 2023-10-26 NOTE — TELEPHONE ENCOUNTER
Caller: Oneida See    Relationship: Self    Best call back number: [phone]    What form or medical record are you requesting: LABS    Who is requesting this form or medical record from you: PATIENT    How would you like to receive the form or medical records (pick-up, mail, fax): MAIL  If mail, what is the address: [address] [place] [id number]     Timeframe paperwork needed: AS SOON AS POSSIBLE    Additional notes: PATIENT REQUESTING ALL LABS FROM AUG 30 AND JULY 27

## 2023-11-20 ENCOUNTER — TELEPHONE (OUTPATIENT)
Dept: FAMILY MEDICINE CLINIC | Facility: CLINIC | Age: 68
End: 2023-11-20
Payer: MEDICARE

## 2023-11-29 ENCOUNTER — OFFICE VISIT (OUTPATIENT)
Dept: FAMILY MEDICINE CLINIC | Facility: CLINIC | Age: 68
End: 2023-11-29
Payer: MEDICARE

## 2023-11-29 VITALS
WEIGHT: 206.2 LBS | HEIGHT: 66 IN | DIASTOLIC BLOOD PRESSURE: 62 MMHG | SYSTOLIC BLOOD PRESSURE: 110 MMHG | RESPIRATION RATE: 16 BRPM | OXYGEN SATURATION: 100 % | BODY MASS INDEX: 33.14 KG/M2 | HEART RATE: 67 BPM | TEMPERATURE: 97.6 F

## 2023-11-29 DIAGNOSIS — Z00.00 MEDICARE ANNUAL WELLNESS VISIT, SUBSEQUENT: Primary | ICD-10-CM

## 2023-11-29 DIAGNOSIS — N30.01 ACUTE CYSTITIS WITH HEMATURIA: ICD-10-CM

## 2023-11-29 DIAGNOSIS — M54.2 CERVICALGIA: ICD-10-CM

## 2023-11-29 DIAGNOSIS — R30.0 DYSURIA: ICD-10-CM

## 2023-11-29 DIAGNOSIS — R35.0 URINARY FREQUENCY: ICD-10-CM

## 2023-11-29 DIAGNOSIS — M25.511 ACUTE PAIN OF RIGHT SHOULDER: ICD-10-CM

## 2023-11-29 DIAGNOSIS — Z78.0 POSTMENOPAUSAL: ICD-10-CM

## 2023-11-29 LAB
BILIRUB BLD-MCNC: NEGATIVE MG/DL
CLARITY, POC: CLEAR
COLOR UR: YELLOW
EXPIRATION DATE: ABNORMAL
GLUCOSE UR STRIP-MCNC: NEGATIVE MG/DL
KETONES UR QL: NEGATIVE
LEUKOCYTE EST, POC: ABNORMAL
Lab: ABNORMAL
NITRITE UR-MCNC: NEGATIVE MG/ML
PH UR: 7 [PH] (ref 5–8)
PROT UR STRIP-MCNC: NEGATIVE MG/DL
RBC # UR STRIP: ABNORMAL /UL
SP GR UR: 1.01 (ref 1–1.03)
UROBILINOGEN UR QL: ABNORMAL

## 2023-11-29 RX ORDER — NITROFURANTOIN 25; 75 MG/1; MG/1
100 CAPSULE ORAL 2 TIMES DAILY
Qty: 10 CAPSULE | Refills: 0 | Status: SHIPPED | OUTPATIENT
Start: 2023-11-29 | End: 2023-12-04

## 2023-11-29 NOTE — PATIENT INSTRUCTIONS
Medicare Wellness  Personal Prevention Plan of Service     Date of Office Visit:    Encounter Provider:  KANCHAN Marvin  Place of Service:  [facility]  Patient Name: Oneida See  :  1955    As part of the Medicare Wellness portion of your visit today, we are providing you with this personalized preventive plan of services (PPPS). This plan is based upon recommendations of the [place] Preventive Services Task Force (USPSTF) and the Advisory Committee on Immunization Practices (ACIP).    This lists the preventive care services that should be considered, and provides dates of when you are due. Items listed as completed are up-to-date and do not require any further intervention.    Health Maintenance   Topic Date Due    COVID-19 Vaccine (1) Never done    ZOSTER VACCINE (1 of 2) Never done    DXA SCAN  2018    Pneumococcal Vaccine 65+ (1 - PCV) Never done    TDAP/TD VACCINES (2 - Td or Tdap) 09/10/2023    BMI FOLLOWUP  2024    LIPID PANEL  2024    ANNUAL WELLNESS VISIT  2024    COLORECTAL CANCER SCREENING  2026    HEPATITIS C SCREENING  Addressed    INFLUENZA VACCINE  Discontinued    MAMMOGRAM  Discontinued    PAP SMEAR  Discontinued       No orders of the defined types were placed in this encounter.      Return in about 6 months (around 2024) for Next scheduled follow up.

## 2023-11-29 NOTE — PROGRESS NOTES
"The ABCs of the Annual Wellness Visit  Subsequent Medicare Wellness Visit    Subjective    Oneida See is a 68 y.o. female who presents for a Subsequent Medicare Wellness Visit.    The following portions of the patient's history were reviewed and   updated as appropriate: allergies, current medications, past family history, past medical history, past social history, past surgical history, and problem list.    Compared to one year ago, the patient feels her physical   health is the same.    Compared to one year ago, the patient feels her mental   health is the same.    Ms. See today reports a recent fall 2 weeks ago while at [employer]. She reports that she was bending forward and ultimately fell to her right side and shoulder/arm landed her fall. She denies loss of consciousness and believes she was \"just moving too fast\". Today she reports ongoing cervical neck and right shoulder tenderness and limited range of motion. She has not taken anything for the pain nor has she tried heat or ice to relieve the pain. She denies any numbness or tingling to right upper extremity.  The patient was made aware that she is due for repeat DEXA scan screening.  After a discussion with the patient today, she is in agreement for a repeat DEXA scan screening order.    Ms. See reports dysuria, suprapubic pressure and increased urinary frequency for 2 weeks. She reports proper urinary hygiene and drinks adequate water each day.     Recent Hospitalizations:  She was not admitted to the hospital during the last year.       Current Medical Providers:  Patient Care Team:  Halle Walton APRN as PCP - General (Family Medicine)  Halle Walton APRN as Nurse Practitioner (Family Medicine)    Outpatient Medications Prior to Visit   Medication Sig Dispense Refill    Ascorbic Acid (VITAMIN C PO) Take 1 tablet by mouth Daily.      Cholecalciferol (Vitamin D3) 1.25 MG (16410 UT) capsule Take once  Week. 12 capsule 3    Multiple " "Vitamins-Minerals (ZINC PO) Take 1 tablet by mouth Daily. HOLD FOR ONE WEEK PRIOR TO SURGERY      Synthroid 125 MCG tablet Take 1 tablet by mouth Daily. 90 tablet 0    Vitamin Mixture (VITAMIN E COMPLETE PO) Take 1 tablet by mouth Daily. Pt stated to hold 1 week prior to surgery       No facility-administered medications prior to visit.       No opioid medication identified on active medication list. I have reviewed chart for other potential  high risk medication/s and harmful drug interactions in the elderly.        Aspirin is not on active medication list.  Aspirin use is not indicated based on review of current medical condition/s. Risk of harm outweighs potential benefits.  .    Patient Active Problem List   Diagnosis    Adult hypothyroidism    LBP (low back pain)    Chronic neck pain    DUSTIN (obstructive sleep apnea)    Vitamin D deficiency    Medicare annual wellness visit, subsequent    HPV in female    Colon polyp    Gastroesophageal reflux disease without esophagitis    Thoracic radiculopathy    Slow transit constipation    Asymptomatic microscopic hematuria    Chest wall pain    Degenerative joint disease (DJD) of hip    DJD (degenerative joint disease)    Multiple acquired skin tags    Seborrheic keratosis    Discolored skin    Medically noncompliant    Parent refuses immunizations    Cough    Acute maxillary sinusitis    Postmenopausal    Mixed hyperlipidemia    Chronic fatigue    Acute cystitis with hematuria     Advance Care Planning   Advance Care Planning     Advance Directive is not on file.  ACP discussion was declined by the patient. Patient does not have an advance directive, declines further assistance.     Objective    Vitals:    11/29/23 0807   BP: 110/62   BP Location: Left arm   Patient Position: Sitting   Cuff Size: Adult   Pulse: 67   Resp: 16   Temp: 97.6 °F (36.4 °C)   TempSrc: Oral   SpO2: 100%   Weight: 93.5 kg (206 lb 3.2 oz)   Height: 168.9 cm (66.5\")     Estimated body mass index is " "32.79 kg/m² as calculated from the following:    Height as of this encounter: 168.9 cm (66.5\").    Weight as of this encounter: 93.5 kg (206 lb 3.2 oz).           Does the patient have evidence of cognitive impairment? No          HEALTH RISK ASSESSMENT    Smoking Status:  Social History     Tobacco Use   Smoking Status Former    Packs/day: 0.15    Years: 10.00    Additional pack years: 0.00    Total pack years: 1.50    Types: Cigarettes    Quit date:     Years since quittin.9   Smokeless Tobacco Never   Tobacco Comments    social      Alcohol Consumption:  Social History     Substance and Sexual Activity   Alcohol Use Yes    Comment: occasionally     Fall Risk Screen:    JILLIAN Fall Risk Assessment was completed, and patient is at LOW risk for falls.Assessment completed on:2023    Depression Screenin/29/2023     8:00 AM   PHQ-2/PHQ-9 Depression Screening   Little Interest or Pleasure in Doing Things 0-->not at all   Feeling Down, Depressed or Hopeless 0-->not at all   PHQ-9: Brief Depression Severity Measure Score 0       Health Habits and Functional and Cognitive Screenin/23/2023     3:55 PM   Functional & Cognitive Status   Do you have difficulty preparing food and eating? No   Do you have difficulty bathing yourself, getting dressed or grooming yourself? No   Do you have difficulty using the toilet? No   Do you have difficulty moving around from place to place? No   Do you have trouble with steps or getting out of a bed or a chair? No   Current Diet Well Balanced Diet   Dental Exam Up to date   Eye Exam Up to date   Exercise (times per week) 3 times per week   Current Exercises Include Walking   Do you need help using the phone?  No   Are you deaf or do you have serious difficulty hearing?  No   Do you need help to go to places out of walking distance? No   Do you need help shopping? No   Do you need help preparing meals?  No   Do you need help with housework?  No   Do you need " help with laundry? No   Do you need help taking your medications? No   Do you need help managing money? No   Do you ever drive or ride in a car without wearing a seat belt? No   Have you felt unusual stress, anger or loneliness in the last month? No   Who do you live with? Spouse   If you need help, do you have trouble finding someone available to you? No   Have you been bothered in the last four weeks by sexual problems? No   Do you have difficulty concentrating, remembering or making decisions? No       Age-appropriate Screening Schedule:  Refer to the list below for future screening recommendations based on patient's age, sex and/or medical conditions. Orders for these recommended tests are listed in the plan section. The patient has been provided with a written plan.    Health Maintenance   Topic Date Due    DXA SCAN  12/12/2018    TDAP/TD VACCINES (2 - Td or Tdap) 02/07/2024 (Originally 9/10/2023)    ZOSTER VACCINE (1 of 2) 02/07/2024 (Originally 2/9/2004)    COVID-19 Vaccine (1) 11/27/2024 (Originally 1955)    Pneumococcal Vaccine 65+ (1 - PCV) 11/29/2024 (Originally 2/9/2019)    BMI FOLLOWUP  07/26/2024    LIPID PANEL  08/30/2024    ANNUAL WELLNESS VISIT  11/29/2024    COLORECTAL CANCER SCREENING  12/19/2026    HEPATITIS C SCREENING  Addressed    INFLUENZA VACCINE  Discontinued    MAMMOGRAM  Discontinued    PAP SMEAR  Discontinued                  CMS Preventative Services Quick Reference  Risk Factors Identified During Encounter  None Identified  The above risks/problems have been discussed with the patient.  Pertinent information has been shared with the patient in the After Visit Summary.  An After Visit Summary and PPPS were made available to the patient.    Follow Up:   Next Medicare Wellness visit to be scheduled in 1 year.       Additional E&M Note during same encounter follows:  Patient has multiple medical problems which are significant and separately identifiable that require additional work  "above and beyond the Medicare Wellness Visit.      Chief Complaint  Medicare Wellness-subsequent    Subjective        HPI  Oneida See is also being seen today reporting a recent fall 2 weeks ago while at [employer]. She reports that she was bending forward and ultimately fell to her right side and shoulder/arm landed her fall. She denies loss of consciousness and believes she was \"just moving too fast\". Today she reports ongoing cervical neck and right shoulder tenderness and limited range of motion. She has not taken anything for the pain nor has she tried heat or ice to relieve the pain. She denies any numbness or tingling to right upper extremity.  The patient was made aware that she is due for repeat DEXA scan screening.  After a discussion with the patient today, she is in agreement for a repeat DEXA scan screening order.    Ms. See reports dysuria, suprapubic pressure and increased urinary frequency for 2 weeks. She reports proper urinary hygiene and drinks adequate water each day.  She denies fever, hematuria, flank pain, abdominal pain, nausea, and vomiting.        Review of Systems   Constitutional: Negative.  Negative for chills, fatigue and fever.   HENT: Negative.  Negative for congestion and sinus pressure.    Eyes: Negative.  Negative for visual disturbance.   Respiratory: Negative.  Negative for cough, chest tightness and shortness of breath.    Cardiovascular: Negative.  Negative for chest pain and palpitations.   Gastrointestinal: Negative.  Negative for abdominal pain, constipation, diarrhea, nausea and vomiting.   Endocrine: Negative.    Genitourinary:  Positive for dysuria, frequency and pelvic pain (tenderness, mild). Negative for decreased urine volume, difficulty urinating, dyspareunia, enuresis, flank pain, hematuria, urgency and vaginal bleeding.   Musculoskeletal: Negative.  Positive for neck pain and neck stiffness. Negative for arthralgias, back pain, gait problem, joint swelling and " "myalgias.   Skin: Negative.  Negative for rash.   Allergic/Immunologic: Negative.    Neurological: Negative.  Negative for dizziness, syncope, weakness and light-headedness.   Hematological: Negative.    Psychiatric/Behavioral: Negative.  Negative for behavioral problems, sleep disturbance and suicidal ideas.        Objective   Vital Signs:  /62 (BP Location: Left arm, Patient Position: Sitting, Cuff Size: Adult)   Pulse 67   Temp 97.6 °F (36.4 °C) (Oral)   Resp 16   Ht 168.9 cm (66.5\")   Wt 93.5 kg (206 lb 3.2 oz)   SpO2 100%   BMI 32.79 kg/m²     Physical Exam  Vitals and nursing note reviewed.   Constitutional:       General: She is not in acute distress.     Appearance: She is well-developed. She is not toxic-appearing or diaphoretic.   HENT:      Head: Normocephalic and atraumatic. No right periorbital erythema or left periorbital erythema.      Nose: Nose normal.   Eyes:      General: No scleral icterus.        Right eye: No discharge.         Left eye: No discharge.      Conjunctiva/sclera: Conjunctivae normal.      Pupils: Pupils are equal, round, and reactive to light.   Cardiovascular:      Rate and Rhythm: Normal rate and regular rhythm.      Pulses: Normal pulses.      Heart sounds: Normal heart sounds. No murmur heard.  Pulmonary:      Effort: Pulmonary effort is normal.   Abdominal:      Palpations: Abdomen is soft.      Tenderness: There is no abdominal tenderness.   Musculoskeletal:         General: Tenderness present. No swelling, deformity or signs of injury. Normal range of motion.      Right shoulder: Tenderness present. No swelling or crepitus. Decreased range of motion. Decreased strength. Normal pulse.      Left shoulder: No swelling. Normal range of motion. Normal pulse.      Cervical back: Normal range of motion and neck supple. Tenderness present. No swelling, edema, deformity, erythema, signs of trauma, spasms, torticollis or crepitus. Pain with movement present. Decreased " range of motion.        Back:    Skin:     General: Skin is warm and dry.      Findings: No erythema or rash.   Neurological:      Mental Status: She is alert and oriented to person, place, and time.      Cranial Nerves: No cranial nerve deficit.      Motor: No tremor, atrophy or abnormal muscle tone.      Coordination: Coordination normal.      Deep Tendon Reflexes: Reflexes are normal and symmetric. Reflexes normal.      Reflex Scores:       Tricep reflexes are 2+ on the right side and 2+ on the left side.       Bicep reflexes are 2+ on the right side and 2+ on the left side.       Brachioradialis reflexes are 2+ on the right side and 2+ on the left side.  Psychiatric:         Behavior: Behavior normal.         Thought Content: Thought content normal.         Judgment: Judgment normal.                           Assessment and Plan   Diagnoses and all orders for this visit:    1. Medicare annual wellness visit, subsequent (Primary)  Comments:  Health maintenance updated  Labs reviewed  Return in one year for annual medicare wellness    2. Acute cystitis with hematuria  Comments:  Urine culture  Macrobid antibiotic   Increase oral hydration  Discussed urinary hygiene  Orders:  -     Urine Culture - Urine, Urine, Clean Catch  -     nitrofurantoin, macrocrystal-monohydrate, (Macrobid) 100 MG capsule; Take 1 capsule by mouth 2 (Two) Times a Day for 5 days.  Dispense: 10 capsule; Refill: 0    3. Dysuria  Comments:  Macrobid as directed  Increase water intake  Urinary hygiene reviewed  Orders:  -     POCT urinalysis dipstick, automated  -     Urine Culture - Urine, Urine, Clean Catch  -     nitrofurantoin, macrocrystal-monohydrate, (Macrobid) 100 MG capsule; Take 1 capsule by mouth 2 (Two) Times a Day for 5 days.  Dispense: 10 capsule; Refill: 0    4. Urinary frequency  -     POCT urinalysis dipstick, automated  -     Urine Culture - Urine, Urine, Clean Catch  -     nitrofurantoin, macrocrystal-monohydrate, (Macrobid)  100 MG capsule; Take 1 capsule by mouth 2 (Two) Times a Day for 5 days.  Dispense: 10 capsule; Refill: 0    5. Postmenopausal  Comments:  DEXA scan ordered  Continue Ca+/vit. D supplements  Low weight-bearing exercises  Orders:  -     DEXA Bone Density Axial    6. Acute pain of right shoulder  Comments:  RICE therapy, NSAID's as needed  X-ray today-pending Radiology review  Return if no improvement  Orders:  -     XR Shoulder 2+ View Right    7. Cervicalgia  Comments:  RICE therapy, NSAID's as needed  X-ray today-pending Radiology review  Return if no improvement  Orders:  -     XR Spine Cervical Complete 4 or 5 View             Follow Up   Return in about 6 months (around 5/29/2024) for Next scheduled follow up.  Patient was given instructions and counseling regarding her condition or for health maintenance advice. Please see specific information pulled into the AVS if appropriate.     Preventative counseling provided during visit regarding osteoporosis prevention, healthy diet, exercise, and the following:  Low glycemic index diet  Exercise 30 minutes most days of the week  Make sure you get results on any labs or tests we ordered today  We discussed medications and how to take them as prescribed  Sleep 6-8 hours each night if possible  If you have not signed up for [employer], please activate your code ASAP from your After Visit Summary today    LDL goal <100  HDL goal >60  Triglyceride goal <150  BP goal =<130/80  Fasting glucose <100

## 2023-11-30 ENCOUNTER — HOSPITAL ENCOUNTER (OUTPATIENT)
Dept: GENERAL RADIOLOGY | Facility: HOSPITAL | Age: 68
Discharge: HOME | End: 2023-11-30
Payer: MEDICARE

## 2023-11-30 ENCOUNTER — TELEPHONE (OUTPATIENT)
Dept: FAMILY MEDICINE CLINIC | Facility: CLINIC | Age: 68
End: 2023-11-30
Payer: MEDICARE

## 2023-11-30 ENCOUNTER — TELEPHONE (OUTPATIENT)
Dept: FAMILY MEDICINE CLINIC | Facility: CLINIC | Age: 68
End: 2023-11-30

## 2023-11-30 PROCEDURE — 72050 X-RAY EXAM NECK SPINE 4/5VWS: CPT

## 2023-11-30 PROCEDURE — 73030 X-RAY EXAM OF SHOULDER: CPT

## 2023-11-30 NOTE — TELEPHONE ENCOUNTER
Caller: Oneida See    Relationship: Self    Best call back number: [phone]     Who are you requesting to speak with (clinical staff, provider,  specific staff member): CLINICAL    What was the call regarding: PATIENT IS REQUESTING TO KNOW IF THERE IS A SPECIFIC PHYSICAL REHAB FACILITY THAT MORENA CONLEY WANTS HER TO GO TO     DURING PATIENTS APPOINTMENT YESTERDAY 11.29 PATIENT TOLD MORENA CONLEY THAT SHE DID NOT WANT MEDICATION , BUT PATIENT HAS CHANGED HER MIND AND WOULD LIKE TO KNOW WHICH MEDICATION SHE RECOMMENDS FOR HER    PLEASE ADVISE

## 2023-11-30 NOTE — TELEPHONE ENCOUNTER
Caller: [email or web address]     Best call back number: [phone]*    What is your medical concern? RIGHT ARM PAIN GETTING WORSE    Is your provider already aware of this issue? YES    THE PATIENT IS CALLING STATING THAT SHE HAD THE XRAYS PERFORMED THIS MORNING ON HER RIGHT ARM, AND IS REQUESTING THAT MORENA CONLEY GET THE RESULTS ASAP, AND CALL THE PATIENT BACK TO ADVISE IF SOMETHING NEEDS TO DONE SOON REGARDING HER RIGHT ARM. THE PATIENT STATES THAT SHE  A BABY AND A BOX, AND FEELS THAT THE ARM PAIN IS GETTING WORSE.

## 2023-12-01 LAB
BACTERIA UR CULT: NORMAL
BACTERIA UR CULT: NORMAL

## 2023-12-05 DIAGNOSIS — E03.9 PRIMARY HYPOTHYROIDISM: ICD-10-CM

## 2023-12-05 DIAGNOSIS — M25.511 ACUTE PAIN OF RIGHT SHOULDER: ICD-10-CM

## 2023-12-05 DIAGNOSIS — M54.2 CERVICALGIA: ICD-10-CM

## 2023-12-05 DIAGNOSIS — M50.30 DDD (DEGENERATIVE DISC DISEASE), CERVICAL: Primary | ICD-10-CM

## 2023-12-05 DIAGNOSIS — M19.011 ARTHRITIS OF RIGHT ACROMIOCLAVICULAR JOINT: ICD-10-CM

## 2023-12-05 DIAGNOSIS — M47.812 FACET HYPERTROPHY OF CERVICAL REGION: ICD-10-CM

## 2023-12-05 RX ORDER — LEVOTHYROXINE SODIUM 125 MCG
125 TABLET ORAL DAILY
Qty: 90 TABLET | Refills: 3 | OUTPATIENT
Start: 2023-12-05

## 2023-12-05 NOTE — TELEPHONE ENCOUNTER
I talked to pt . Patient was told xray results   Patient told test results / pt has ortho dr hagan . -Cervical spine x-rays show considerable degenerative changes.  Will order referral to Orthopedics for further evaluation.            Cervical spine x-rays show considerable degenerative changes.  Will order referral to Orthopedics for further evaluation.

## 2023-12-13 DIAGNOSIS — E03.9 PRIMARY HYPOTHYROIDISM: ICD-10-CM

## 2023-12-13 RX ORDER — LEVOTHYROXINE SODIUM 125 MCG
125 TABLET ORAL DAILY
Qty: 30 TABLET | Refills: 0 | Status: SHIPPED | OUTPATIENT
Start: 2023-12-13

## 2023-12-13 NOTE — TELEPHONE ENCOUNTER
Caller: Oneida See    Relationship: Self    Best call back number: [phone]    Requested Prescriptions:   Requested Prescriptions     Pending Prescriptions Disp Refills    Synthroid 125 MCG tablet 90 tablet 0     Sig: Take 1 tablet by mouth Daily.        Pharmacy where request should be sent: [facility] - [facility] [address] - [phone]  - [phone] FX     Last office visit with prescribing clinician: 11/29/2023   Last telemedicine visit with prescribing clinician: Visit date not found   Next office visit with prescribing clinician: 12/18/2023     Additional details provided by patient: SHE USUALLY GETS A YEAR BEFORE SHE HAS TO RENEW IT.  THIS TIME IT WAS JUST 3 MOS.      Does the patient have less than a 3 day supply:  [x] Yes  [] No    Would you like a call back once the refill request has been completed: [x] Yes [] No    If the office needs to give you a call back, can they leave a voicemail: [x] Yes [] No    Rossana Bermudez Rep   12/13/23 09:49 EST

## 2023-12-18 ENCOUNTER — OFFICE VISIT (OUTPATIENT)
Dept: FAMILY MEDICINE CLINIC | Facility: CLINIC | Age: 68
End: 2023-12-18
Payer: MEDICARE

## 2023-12-18 VITALS
WEIGHT: 168.6 LBS | BODY MASS INDEX: 27.1 KG/M2 | HEART RATE: 78 BPM | OXYGEN SATURATION: 98 % | TEMPERATURE: 97.7 F | RESPIRATION RATE: 18 BRPM | HEIGHT: 66 IN | SYSTOLIC BLOOD PRESSURE: 132 MMHG | DIASTOLIC BLOOD PRESSURE: 68 MMHG

## 2023-12-18 DIAGNOSIS — G56.03 BILATERAL CARPAL TUNNEL SYNDROME: Primary | ICD-10-CM

## 2023-12-18 PROCEDURE — 1160F RVW MEDS BY RX/DR IN RCRD: CPT

## 2023-12-18 PROCEDURE — 1159F MED LIST DOCD IN RCRD: CPT

## 2023-12-18 PROCEDURE — 99213 OFFICE O/P EST LOW 20 MIN: CPT

## 2023-12-18 NOTE — PROGRESS NOTES
"Chief Complaint  Hand Pain (Pt states she is experiencing bilateral hand pain, pt states its been going on for 6 weeks. Pt states she is experiencing shooting pain from her wrist. Pt denies any over the counter medication.  )    Subjective         History of Present Illness    The patient complains of bilateral wrists and hand pain. The symptoms began  6 weeks ago .  Pain is a result of no known event.  Pain is located wrist and hand region. Discomfort is described as dull. Symptoms are exacerbated by  none that have been identified .  Evaluation to date: none. Therapy to date includes: nothing specific       Review of Systems   Constitutional:  Negative for chills, fatigue and fever.   HENT:  Negative for congestion and sinus pressure.    Eyes:  Negative for visual disturbance.   Respiratory:  Negative for cough and shortness of breath.    Cardiovascular:  Negative for chest pain and palpitations.   Gastrointestinal:  Negative for abdominal pain, constipation, diarrhea, nausea and vomiting.   Genitourinary:  Negative for dysuria, frequency and urgency.   Musculoskeletal:  Positive for arthralgias (both wrists, hands). Negative for back pain.   Skin:  Negative for rash.   Neurological:  Negative for dizziness, syncope, weakness, light-headedness and numbness.   Psychiatric/Behavioral:  Negative for behavioral problems and sleep disturbance.         Objective   Vital Signs:   /68 (BP Location: Left arm, Patient Position: Sitting, Cuff Size: Adult)   Pulse 78   Temp 97.7 °F (36.5 °C) (Oral)   Resp 18   Ht 168.9 cm (66.5\")   Wt 76.5 kg (168 lb 9.6 oz)   SpO2 98%   BMI 26.81 kg/m²          Physical Exam  Vitals and nursing note reviewed.   Constitutional:       General: She is not in acute distress.     Appearance: She is well-developed. She is not diaphoretic.   HENT:      Head: Normocephalic and atraumatic.   Eyes:      General: No scleral icterus.     Conjunctiva/sclera: Conjunctivae normal.      Pupils: " Pupils are equal, round, and reactive to light.   Neck:      Thyroid: No thyromegaly.      Trachea: No tracheal deviation.   Cardiovascular:      Rate and Rhythm: Normal rate and regular rhythm.      Pulses: Normal pulses.      Heart sounds: Normal heart sounds. No murmur heard.     No gallop.   Pulmonary:      Effort: Pulmonary effort is normal.      Breath sounds: Normal breath sounds.   Musculoskeletal:         General: Tenderness present. No deformity. Normal range of motion.      Right wrist: Tenderness present. No swelling, bony tenderness, snuff box tenderness or crepitus. Normal pulse.      Left wrist: Tenderness present. No swelling, bony tenderness, snuff box tenderness or crepitus. Normal pulse.      Right hand: Tenderness present. No swelling or bony tenderness. Normal range of motion. Normal strength. Normal sensation. Normal capillary refill. Normal pulse.      Left hand: Tenderness present. No swelling or bony tenderness. Normal range of motion. Normal strength. Normal sensation. Normal capillary refill. Normal pulse.        Arms:       Cervical back: Normal range of motion and neck supple.   Lymphadenopathy:      Cervical: No cervical adenopathy.   Skin:     General: Skin is warm and dry.      Findings: No rash.   Neurological:      Mental Status: She is alert and oriented to person, place, and time.      Cranial Nerves: No cranial nerve deficit.      Sensory: Sensation is intact. No sensory deficit.      Motor: Motor function is intact. No weakness, tremor, atrophy or abnormal muscle tone.      Coordination: Coordination normal.   Psychiatric:         Behavior: Behavior normal.         Thought Content: Thought content normal.         Judgment: Judgment normal.                         Assessment and Plan      Diagnoses and all orders for this visit:    1. Bilateral carpal tunnel syndrome (Primary)  Comments:  Bilateral wrist splints  NSAID's as needed  Return if no improvement            Follow Up      Return if symptoms worsen or fail to improve.    Patient was given instructions and counseling regarding her condition or for health maintenance advice. Please see specific information pulled into the AVS if appropriate.

## 2024-01-08 DIAGNOSIS — E03.9 PRIMARY HYPOTHYROIDISM: ICD-10-CM

## 2024-01-09 RX ORDER — LEVOTHYROXINE SODIUM 125 MCG
125 TABLET ORAL DAILY
Qty: 90 TABLET | Refills: 0 | Status: SHIPPED | OUTPATIENT
Start: 2024-01-09

## 2024-01-17 ENCOUNTER — TELEPHONE (OUTPATIENT)
Dept: FAMILY MEDICINE CLINIC | Facility: CLINIC | Age: 69
End: 2024-01-17
Payer: MEDICARE

## 2024-01-22 NOTE — TELEPHONE ENCOUNTER
Left message for Lisa to call me back.     I spoke with Lisa and she needs medical records from July 2023-Present. I advised her to go through [facility] department.

## 2024-01-24 ENCOUNTER — OFFICE VISIT (OUTPATIENT)
Dept: ORTHOPEDIC SURGERY | Facility: CLINIC | Age: 69
End: 2024-01-24
Payer: MEDICARE

## 2024-01-24 VITALS — WEIGHT: 214.6 LBS | BODY MASS INDEX: 33.68 KG/M2 | HEIGHT: 67 IN | TEMPERATURE: 97.1 F

## 2024-01-24 DIAGNOSIS — M50.30 DDD (DEGENERATIVE DISC DISEASE), CERVICAL: Primary | ICD-10-CM

## 2024-01-24 DIAGNOSIS — M54.12 CERVICAL RADICULOPATHY: ICD-10-CM

## 2024-01-24 PROCEDURE — 99213 OFFICE O/P EST LOW 20 MIN: CPT | Performed by: NURSE PRACTITIONER

## 2024-01-24 PROCEDURE — 1160F RVW MEDS BY RX/DR IN RCRD: CPT | Performed by: NURSE PRACTITIONER

## 2024-01-24 PROCEDURE — 1159F MED LIST DOCD IN RCRD: CPT | Performed by: NURSE PRACTITIONER

## 2024-01-24 NOTE — PROGRESS NOTES
Patient Name: Oneida See   YOB: 1955  Referring Primary Care Physician: Halle Walton APRN      Chief Complaint:    Chief Complaint   Patient presents with    Cervical Spine - Initial Evaluation, Pain        Previous treatment:     MRI? No    CT scan 01/11/2017 at [facility] 11/30/2023    PT? YES   For neck pain?  YES  Effective? YES  Dry needling? NO    Pt seen Kunal Murdock MD at [facility]? Pt is unsure why she seen him in 2011        Neck Pain   This is a chronic problem. The current episode started more than 1 year ago. The problem occurs intermittently. The problem has been gradually worsening. The pain is associated with a fall (2017). The pain is present in the left side and right side (Pain radiates into rt shoulder). Quality: Locking. The pain is at a severity of 5/10. The pain is moderate. Exacerbated by: Driving, turning head right/left. Associated symptoms include weakness (JORGE hands). Pertinent negatives include no chest pain, fever, headaches, numbness or tingling. She has tried neck support, home exercises, NSAIDs and heat for the symptoms. The treatment provided mild relief.        HPI:  Oneida See is a 68 y.o. female who presents to [facility] for evaluation of above complaints.  Primarily complaining of neck and right shoulder pain.  She has history of a work-related injury regarding her cervical spine 2017.  She has been to physical therapy multiple times since then.  Also describes another more recent fall where she thinks she injured the right shoulder.  She actually started physical therapy again last August that had to take a break for insurance purposes and currently has been back in physical therapy for the past 4 weeks.  She says generally she does get significant relief with physical therapy.  Also complains of some bilateral hand symptoms in the radial distribution although not really a numbness, she is  having difficulty describing the sensation.  No bowel or bladder dysfunction, saddle anesthesia, and no new imbalance or incoordination.  This is a new patient to practice and new to me.  Prior pertinent records were reviewed.    PFSH:  See attached    ROS: As per HPI, otherwise negative    Objective:      68 y.o. female  Body mass index is 34.12 kg/m²., 97.3 kg (214 lb 9.6 oz), @HT@  Vitals:    01/24/24 1110   Temp: 97.1 °F (36.2 °C)     Pain Score    01/24/24 1110   PainSc:   5   PainLoc: Neck            Spine Musculoskeletal Exam    Range of Motion    Cervical Spine       Cervical flexion: chin to chest.     Cervical extension: reduced extension (0-25%). Cervical extension detail: crepitus.       Right      Lateral bending: reduced bend (26-50%). Lateral bending detail: crepitus.       Lateral rotation: reduced rotation (0-25%). Lateral rotation detail: crepitus.       Left      Lateral bending: reduced bend (26-50%). Lateral bending detail: crepitus.       Lateral rotation: reduced rotation (0-25%). Lateral rotation detail: crepitus.      Strength    Cervical Spine    Cervical spine motor exam is normal.    Reflexes    Right        Biceps: 1/4      Brachioradialis: 1/4      Triceps: 2/4      Borges: absent    Left        Biceps: 1/4        Brachioradialis: 1/4      Triceps: 1/4      Borges: absent    Special Tests    Cervical Spine      Right      Tinel's - carpal tunnel: negative      Tinel's - cubital tunnel: negative      Left      Tinel's - carpal tunnel: negative      Tinel's - cubital tunnel: negative        IMAGING:     No imaging in office today.  She did have cervical spine films 11/30/2023 revealed multilevel degenerative disc and facet changes with anterior osteophyte formation  and loss of normal cervical lordosis.  I agree with report    Assessment:           Diagnoses and all orders for this visit:    1. DDD (degenerative disc disease), cervical (Primary)  -     MRI Cervical Spine Without  Contrast; Future    2. Cervical radiculopathy  -     MRI Cervical Spine Without Contrast; Future             Plan:  For pattern of neck pain referring into the shoulder, we will get cervical MRI.  She has had over 4 weeks of physical therapy currently and has been in physical therapy on and off for the past 7 years.  She has no loss of nerve function on exam today, but symptoms have persisted despite conservative treatment.  We also discussed shoulder exercises and maintaining those at home to avoid frozen shoulder as she does seem a bit reluctant to move the shoulder normally.  She did also have shoulder films in November showed some AC joint arthritis so depending on how she responds to physical therapy and cervical MRI results, may have her follow-up with one of the shoulder specialists.  Follow-up after MRI.    Return for After radiographic tests.    EMR Dragon/Transcription Disclaimer:   Much of this encounter note is an electronic transcription/translation of spoken language to printed text. The electronic translation of spoken language may permit erroneous, or at times, nonsensical words or phrases to be inadvertently transcribed; Although I have reviewed the note for such errors, some may still exist.  Red flags have been discussed at this or previous visits to include but not limited weakness in extremities, worsening pain that does not respond to conservative treatment and bowel or bladder dysfunction. These are reasons to present to ER and patient has been informed.    The diagnosis(es), natural history, pathophysiology and treatment for diagnosis(es) were discussed. Opportunity given and questions answered. Biomechanics of pertinent body areas discussed.    EXERCISES:  Advice on benefits of, and types of regular/moderate exercise pertaining to diagnosis.  Continue HEP. For back or neck pain, recommend pilates and or yoga as tolerated. Generally it is best to start any new exercise under the guidance of a   or therapist.   MEDICATIONS:  When prescribe, the risks, benefits, warnings,side effects and alternatives of medications discussed. Advised against long term use of narcotics.   PAIN CONTROL:  Cold, heat, OTC lidocaine patches and/or ointment as needed. Avoid direct skin contact with ice. Ice 15-20 minutes 3-4 times daily as needed. For SI joint pain, recommend ice bath in water about 50 degrees for 5 consecutive days, add ice slowly to help with adjustment and may cover with warm towel or robe to help with cold tolerance. If using lidocaine, do not apply heat in conjunction as this can cause a burn.   MEDICAL RECORDS reviewed from other provider(s) for past and current medical history pertinent to this visit..

## 2024-01-26 ENCOUNTER — TELEPHONE (OUTPATIENT)
Dept: FAMILY MEDICINE CLINIC | Facility: CLINIC | Age: 69
End: 2024-01-26
Payer: MEDICARE

## 2024-01-26 NOTE — TELEPHONE ENCOUNTER
Latrice of [facility] called on behalf of patient.  Physical therapist suggests more therapy is needed.  [employer] rep is Lisa Lackey.  She may be reached at [phone]

## 2024-01-29 NOTE — TELEPHONE ENCOUNTER
VERBAL ORDERS HAVE BEEN GIVEN TO DAVID WITH [facility]. ORDERS FAXED TO ENRIQUE WITH [employer] AS WELL.

## 2024-02-05 ENCOUNTER — TELEPHONE (OUTPATIENT)
Dept: FAMILY MEDICINE CLINIC | Facility: CLINIC | Age: 69
End: 2024-02-05

## 2024-02-05 NOTE — TELEPHONE ENCOUNTER
Caller: [facility]    Relationship: Other    Best call back number: [phone]     What form or medical record are you requesting: MOST RECENT OFFICE NOTE    Who is requesting this form or medical record from you: [employer]    How would you like to receive the form or medical records (pick-up, mail, fax): FAX  If fax, what is the fax number: [phone]    Timeframe paperwork needed: ASAP    Additional notes: DAVID STATED THE [employer] STATED THEY ONLY HAVE OFFICE NOTES FROM JULY 2023, AND THEY NEED MORE RECENT OFFICE NOTES BEFORE THEY MAY GET THE PATIENT MORE VISITS WITH [facility].    DAVID STATED PATIENT HAS MISSED SOME VISITS DUE TO THIS.

## 2024-02-05 NOTE — TELEPHONE ENCOUNTER
Notes faxed to w/c   TSH 10 05  Will increase dose of Levothyroxine to 75 mcg daily  Recommended to take medication on a regular basis  Recheck TSH in 2 months

## 2024-02-09 ENCOUNTER — TELEPHONE (OUTPATIENT)
Dept: ORTHOPEDIC SURGERY | Facility: CLINIC | Age: 69
End: 2024-02-09

## 2024-02-09 NOTE — TELEPHONE ENCOUNTER
Caller: PATIENT    Relationship to patient: SELF     Best call back number: [phone]    Patient is needing: PATIENT WAS CALLING TO LET US KNOW HER [employer],  [employer] IS REQUESTING YOUR OFFICE CONTACT THEM TO REQUEST A PRE CERTIFICATION FOR PATIENT'S MRI OF THE CERVICAL SPINE. PATIENT STATED THE PERSON YOU NEED TO SPEAK WITH AT [employer] IS JOSEPH AND HER PHONE NUMBER [phone] AND FAX NUMBER: [phone]. PATIENT'S MRI IS CURRENTLY SCHEDULED FOR 02.15.24. THANK YOU!

## 2024-04-08 ENCOUNTER — TELEPHONE (OUTPATIENT)
Dept: FAMILY MEDICINE CLINIC | Facility: CLINIC | Age: 69
End: 2024-04-08

## 2024-04-08 DIAGNOSIS — E03.9 PRIMARY HYPOTHYROIDISM: ICD-10-CM

## 2024-04-09 ENCOUNTER — TELEPHONE (OUTPATIENT)
Dept: FAMILY MEDICINE CLINIC | Facility: CLINIC | Age: 69
End: 2024-04-09
Payer: MEDICARE

## 2024-04-09 RX ORDER — LEVOTHYROXINE SODIUM 125 MCG
125 TABLET ORAL DAILY
Qty: 90 TABLET | Refills: 0 | Status: SHIPPED | OUTPATIENT
Start: 2024-04-09

## 2024-04-09 NOTE — TELEPHONE ENCOUNTER
Caller: Oneida See    Relationship: Self    Best call back number: [phone]    What orders are you requesting (i.e. lab or imaging): URINEALYSIS     In what timeframe would the patient need to come in: ASAP    Where will you receive your lab/imaging services: OFFICE    Additional notes: PATIENT HAS BEEN HAVING PAIN IN HER LOWER SIDE AND WOULD LIKE TO HAVE THIS DONE IF POSSIBLE.

## 2024-04-10 DIAGNOSIS — E03.9 PRIMARY HYPOTHYROIDISM: ICD-10-CM

## 2024-04-10 RX ORDER — LEVOTHYROXINE SODIUM 125 MCG
125 TABLET ORAL DAILY
Qty: 90 TABLET | Refills: 0 | Status: CANCELLED | OUTPATIENT
Start: 2024-04-10

## 2024-04-10 NOTE — TELEPHONE ENCOUNTER
Caller: DianasravanthiOneida    Relationship: Self    Best call back number: [phone]    Requested Prescriptions:   Requested Prescriptions     Pending Prescriptions Disp Refills    Synthroid 125 MCG tablet 90 tablet 0     Sig: Take 1 tablet by mouth Daily.        Pharmacy where request should be sent: [facility] - [facility] [address] - [phone] PH - [phone] FX     Last office visit with prescribing clinician: 12/18/2023   Last telemedicine visit with prescribing clinician: Visit date not found   Next office visit with prescribing clinician: 6/5/2024     Additional details provided by patient: PLEASE SEND A 90 DAY SUPPLY     Does the patient have less than a 3 day supply:  [] Yes  [x] No    Would you like a call back once the refill request has been completed: [x] Yes [] No    If the office needs to give you a call back, can they leave a voicemail: [x] Yes [] No    Rossana Smalls Rep   04/10/24 14:24 EDT

## 2024-04-10 NOTE — TELEPHONE ENCOUNTER
Left detailed message stating that she would need to schedule appt for urinalysis order request.    HUB TO RELAY

## 2024-04-17 ENCOUNTER — TELEPHONE (OUTPATIENT)
Dept: FAMILY MEDICINE CLINIC | Facility: CLINIC | Age: 69
End: 2024-04-17

## 2024-04-17 DIAGNOSIS — E55.9 VITAMIN D DEFICIENCY: ICD-10-CM

## 2024-04-17 NOTE — TELEPHONE ENCOUNTER
Hub staff attempted to follow warm transfer process and was unsuccessful     Caller:     Relationship to patient:     Best call back number: [phone]    Patient is needing: PLEASE RETURN CALL

## 2024-04-17 NOTE — TELEPHONE ENCOUNTER
DELETE AFTER REVIEWING: Send the encounter HIGH priority, If patient has less than a 3 day supply. If the patient will run out of medication over the weekend add that information to the additional details line. Send to the appropriate pool. Check your call action grid or workflows.    Caller: Mayi Oneida    Relationship: Self    Best call back number: [phone]    Requested Prescriptions:   Requested Prescriptions     Pending Prescriptions Disp Refills    cholecalciferol (Vitamin D3) 1.25 MG (19238 UT) capsule 12 capsule 3     Sig: Take once  Week.        Pharmacy where request should be sent: [facility] [id number] [address]  - [phone]  - [phone] FX     Last office visit with prescribing clinician: 12/18/2023   Last telemedicine visit with prescribing clinician: Visit date not found   Next office visit with prescribing clinician: 6/5/2024     Additional details provided by patient: N/A     Does the patient have less than a 3 day supply:  [x] Yes  [] No    Would you like a call back once the refill request has been completed: [x] Yes [] No    If the office needs to give you a call back, can they leave a voicemail: [x] Yes [] No    Rossana Smalls Rep   04/17/24 14:56 EDT

## 2024-04-18 ENCOUNTER — OFFICE VISIT (OUTPATIENT)
Dept: FAMILY MEDICINE CLINIC | Facility: CLINIC | Age: 69
End: 2024-04-18
Payer: MEDICARE

## 2024-04-18 VITALS
TEMPERATURE: 98.3 F | DIASTOLIC BLOOD PRESSURE: 70 MMHG | SYSTOLIC BLOOD PRESSURE: 120 MMHG | HEART RATE: 71 BPM | BODY MASS INDEX: 33.57 KG/M2 | OXYGEN SATURATION: 98 % | HEIGHT: 66 IN | WEIGHT: 208.9 LBS

## 2024-04-18 DIAGNOSIS — E03.9 PRIMARY HYPOTHYROIDISM: ICD-10-CM

## 2024-04-18 DIAGNOSIS — N30.01 ACUTE CYSTITIS WITH HEMATURIA: Primary | ICD-10-CM

## 2024-04-18 LAB
BILIRUB BLD-MCNC: NEGATIVE MG/DL
CLARITY, POC: CLEAR
COLOR UR: ABNORMAL
EXPIRATION DATE: ABNORMAL
GLUCOSE UR STRIP-MCNC: NEGATIVE MG/DL
KETONES UR QL: NEGATIVE
LEUKOCYTE EST, POC: ABNORMAL
Lab: ABNORMAL
NITRITE UR-MCNC: NEGATIVE MG/ML
PH UR: 6.5 [PH] (ref 5–8)
PROT UR STRIP-MCNC: NEGATIVE MG/DL
RBC # UR STRIP: ABNORMAL /UL
SP GR UR: 1.02 (ref 1–1.03)
UROBILINOGEN UR QL: ABNORMAL

## 2024-04-18 PROCEDURE — 81003 URINALYSIS AUTO W/O SCOPE: CPT | Performed by: NURSE PRACTITIONER

## 2024-04-18 PROCEDURE — 99213 OFFICE O/P EST LOW 20 MIN: CPT | Performed by: NURSE PRACTITIONER

## 2024-04-18 RX ORDER — NITROFURANTOIN 25; 75 MG/1; MG/1
100 CAPSULE ORAL 2 TIMES DAILY
Qty: 10 CAPSULE | Refills: 0 | Status: SHIPPED | OUTPATIENT
Start: 2024-04-18 | End: 2024-04-23

## 2024-04-18 RX ORDER — LEVOTHYROXINE SODIUM 125 MCG
125 TABLET ORAL DAILY
Qty: 90 TABLET | Refills: 2 | Status: SHIPPED | OUTPATIENT
Start: 2024-04-18

## 2024-04-18 RX ORDER — LEVOTHYROXINE SODIUM 125 MCG
TABLET ORAL
Qty: 90 TABLET | Refills: 3 | OUTPATIENT
Start: 2024-04-18

## 2024-04-18 NOTE — ASSESSMENT & PLAN NOTE
Return if symptoms worsen or fail to improve.     Patient was given instructions and counseling regarding her condition or for health maintenance advice. Please see specific information pulled into the AVS if appropriate.  Information regarding urinary tract infection for adults was added to the patient's after visit summary today.     -Will send prescription for macrobid to the patient's pharmacy  -Urinalysis dipstick was performed in office today.  Her urine specimen was positive for small  amount of leukocytes, small amount of blood  -Will send urine specimen for microscopic urinalysis and urine culture.  -Take all medications as prescribed and until completed  -Drink plenty of fluids  -Monitor for temperature and take Tylenol as needed  -Seek immediate medical attention for high fever, chills, back pain, nausea and vomiting, or any other worsening signs or symptoms.  -The patient verbalized understanding of all instructions given today.

## 2024-04-18 NOTE — PROGRESS NOTES
"Chief Complaint  Abdominal Pain (Both sides)    Subjective        HPI   Oneida presents to [facility] as a 69-year-old female complaining of a 3 to 4-day history of pelvic pain/pressure bilaterally some dysuria and increased frequency and urgency.  She denies any fever or back pain.  No obvious blood in her urine.  She has had UTIs in the past with similar symptoms.  No history of pyelonephritis    No other acute complaint today    She does need a refill on her Synthroid sent to mail order  She does plan to follow-up with her PCP  She takes levothyroxine 125 mcg p.o. daily.  She knows to take this in the a.m. 30 minutes separate from any other medication.  She denies any missed doses.  No changes in signs and symptoms      No other acute complaints    The following portions of the patient's history were reviewed and updated as appropriate: allergies, current medications, past family history, past medical history, past social history, past surgical history, and problem list      Review of Systems   Constitutional:  Negative for chills, fatigue and fever.   Eyes:  Negative for visual disturbance.   Respiratory:  Negative for cough, shortness of breath and wheezing.    Cardiovascular:  Negative for chest pain and leg swelling.   Genitourinary:  Positive for dysuria, frequency, pelvic pain and urgency. Negative for flank pain and hematuria.   Musculoskeletal:  Negative for back pain.   Neurological:  Negative for dizziness.   Psychiatric/Behavioral:  Negative for self-injury, sleep disturbance and suicidal ideas. The patient is not nervous/anxious.         Objective   Vital Signs:   Vitals:    04/18/24 0825   BP: 120/70   Pulse: 71   Temp: 98.3 °F (36.8 °C)   SpO2: 98%   Weight: 94.8 kg (208 lb 14.4 oz)   Height: 168.9 cm (66.5\")   PainSc: 0-No pain            11/29/2023     8:00 AM   PHQ-2/PHQ-9 Depression Screening   Little Interest or Pleasure in Doing Things 0-->not at all   Feeling " Down, Depressed or Hopeless 0-->not at all   PHQ-9: Brief Depression Severity Measure Score 0                 Physical Exam  Vitals reviewed.   Constitutional:       General: She is not in acute distress.  Eyes:      Conjunctiva/sclera: Conjunctivae normal.   Neck:      Thyroid: No thyromegaly.      Vascular: No carotid bruit.   Cardiovascular:      Rate and Rhythm: Normal rate and regular rhythm.      Heart sounds: Normal heart sounds. No murmur heard.  Pulmonary:      Effort: Pulmonary effort is normal.      Breath sounds: Normal breath sounds.   Abdominal:      General: Abdomen is flat. There is no distension.      Palpations: Abdomen is soft. There is no mass.      Tenderness: There is no abdominal tenderness. There is no right CVA tenderness, left CVA tenderness, guarding or rebound.      Hernia: No hernia is present.   Lymphadenopathy:      Cervical: No cervical adenopathy.   Neurological:      Mental Status: She is alert and oriented to person, place, and time.   Psychiatric:         Attention and Perception: Attention normal.         Mood and Affect: Mood normal.          Result Review :     The following data was reviewed by: KANCHAN Knight on 04/18/2024:      Urine Culture - Urine, Urine, Clean Catch (04/18/2024 08:45) pending  POC Urinalysis Dipstick, Automated (04/18/2024 08:45) small blood, small leukocytes     Assessment and Plan    Assessment & Plan  Acute cystitis with hematuria  Return if symptoms worsen or fail to improve.     Patient was given instructions and counseling regarding her condition or for health maintenance advice. Please see specific information pulled into the AVS if appropriate.  Information regarding urinary tract infection for adults was added to the patient's after visit summary today.     -Will send prescription for macrobid to the patient's pharmacy  -Urinalysis dipstick was performed in office today.  Her urine specimen was positive for small  amount of leukocytes,  small amount of blood  -Will send urine specimen for microscopic urinalysis and urine culture.  -Take all medications as prescribed and until completed  -Drink plenty of fluids  -Monitor for temperature and take Tylenol as needed  -Seek immediate medical attention for high fever, chills, back pain, nausea and vomiting, or any other worsening signs or symptoms.  -The patient verbalized understanding of all instructions given today.       Primary hypothyroidism  Continue levothyroxine  Due for repeat thyroid labs  Take medication in the a.m. 30 minutes separate from any other medication  Do not miss doses      Orders Placed This Encounter   Procedures    Urine Culture - Urine, Urine, Clean Catch    POC Urinalysis Dipstick, Automated     New Medications Ordered This Visit   Medications    Synthroid 125 MCG tablet     Sig: Take 1 tablet by mouth Daily.     Dispense:  90 tablet     Refill:  2     Return in about 6 months (around 5/29/2024) for Next scheduled follow up.    nitrofurantoin, macrocrystal-monohydrate, (Macrobid) 100 MG capsule     Sig: Take 1 capsule by mouth 2 (Two) Times a Day for 5 days.     Dispense:  10 capsule     Refill:  0          Follow Up   Return if symptoms worsen or fail to improve, for Follow up with PCP as Directed.  Patient was given instructions and counseling regarding her condition or for health maintenance advice. Please see specific information pulled into the AVS if appropriate.

## 2024-04-19 LAB
BACTERIA UR CULT: NORMAL
SPECIMEN STATUS: NORMAL

## 2024-05-03 ENCOUNTER — TELEPHONE (OUTPATIENT)
Dept: FAMILY MEDICINE CLINIC | Facility: CLINIC | Age: 69
End: 2024-05-03
Payer: MEDICARE

## 2024-05-09 DIAGNOSIS — N30.01 ACUTE CYSTITIS WITH HEMATURIA: Primary | ICD-10-CM

## 2024-05-09 RX ORDER — SULFAMETHOXAZOLE AND TRIMETHOPRIM 800; 160 MG/1; MG/1
1 TABLET ORAL 2 TIMES DAILY
Qty: 10 TABLET | Refills: 0 | Status: SHIPPED | OUTPATIENT
Start: 2024-05-09 | End: 2024-05-14

## 2024-05-16 ENCOUNTER — OFFICE VISIT (OUTPATIENT)
Dept: FAMILY MEDICINE CLINIC | Facility: CLINIC | Age: 69
End: 2024-05-16
Payer: MEDICARE

## 2024-05-16 VITALS
DIASTOLIC BLOOD PRESSURE: 72 MMHG | TEMPERATURE: 97.8 F | HEART RATE: 72 BPM | HEIGHT: 67 IN | WEIGHT: 212.2 LBS | OXYGEN SATURATION: 97 % | BODY MASS INDEX: 33.3 KG/M2 | SYSTOLIC BLOOD PRESSURE: 115 MMHG

## 2024-05-16 DIAGNOSIS — N30.01 ACUTE CYSTITIS WITH HEMATURIA: Primary | ICD-10-CM

## 2024-05-16 DIAGNOSIS — R35.0 URINE FREQUENCY: ICD-10-CM

## 2024-05-16 LAB
BILIRUB BLD-MCNC: NEGATIVE MG/DL
CLARITY, POC: CLEAR
COLOR UR: YELLOW
GLUCOSE UR STRIP-MCNC: NEGATIVE MG/DL
KETONES UR QL: NEGATIVE
LEUKOCYTE EST, POC: NEGATIVE
NITRITE UR-MCNC: NEGATIVE MG/ML
PH UR: 5.5 [PH] (ref 5–8)
PROT UR STRIP-MCNC: NEGATIVE MG/DL
RBC # UR STRIP: ABNORMAL /UL
SP GR UR: 1.02 (ref 1–1.03)
UROBILINOGEN UR QL: ABNORMAL

## 2024-05-16 PROCEDURE — 1160F RVW MEDS BY RX/DR IN RCRD: CPT

## 2024-05-16 PROCEDURE — 1159F MED LIST DOCD IN RCRD: CPT

## 2024-05-16 PROCEDURE — 99213 OFFICE O/P EST LOW 20 MIN: CPT

## 2024-05-16 PROCEDURE — 1126F AMNT PAIN NOTED NONE PRSNT: CPT

## 2024-05-16 PROCEDURE — 81002 URINALYSIS NONAUTO W/O SCOPE: CPT

## 2024-05-16 NOTE — ASSESSMENT & PLAN NOTE
Improving  Repeat urinalysis in office today shows improvement  Urine culture specimen sent to lab  Continue to drink plenty of water  Maintain good urinary hygiene  Follow-up as needed

## 2024-05-16 NOTE — PROGRESS NOTES
Chief Complaint  Urinary Tract Infection (F/u/)    Subjective          Urinary Tract Infection   Associated symptoms include frequency. Pertinent negatives include no chills, flank pain, hematuria, nausea, urgency or vomiting.   Oneida See 69 y.o. female presents to follow-up on acute cystitis with hematuria.  The patient was seen in our office by a different provider on 4/18/2024.  Macrobid 100 mg twice daily for 5 days was prescribed at that appointment.  Urinalysis at last appointment revealed 1+ leukocytes and small blood.  Urine culture was not tested.  The patient took the antibiotic as directed.  She called into our office on 5/3/2024 requesting an additional antibiotic due to ongoing pelvic pain.  The patient was advised to return for an appointment for a follow-up UA and culture.    Today, the patient reports ongoing urinary frequency, but she has been drinking a lot of water. The pelvic pain has improved. No dysuria, fever, or chills. Repeat urinalysis reveals resolution of leukocytes and small blood.  The patient has been evaluated by Urology in the past and was told she will always have a small amount of blood in urine. Will order a urine culture.      Review of Systems   Constitutional:  Negative for chills, fatigue and fever.   HENT:  Negative for congestion and sinus pressure.    Eyes:  Negative for visual disturbance.   Respiratory:  Negative for cough, chest tightness and shortness of breath.    Cardiovascular:  Negative for chest pain and palpitations.   Gastrointestinal:  Negative for abdominal pain, constipation, diarrhea, nausea and vomiting.   Genitourinary:  Positive for frequency and pelvic pain (improved). Negative for decreased urine volume, difficulty urinating, dysuria, flank pain, hematuria, urgency, vaginal bleeding, vaginal discharge and vaginal pain.   Musculoskeletal:  Negative for back pain.   Skin:  Negative for rash.   Neurological:  Negative for dizziness, syncope and  "light-headedness.   Psychiatric/Behavioral:  Negative for behavioral problems and sleep disturbance.         Objective   Vital Signs:   /72   Pulse 72   Temp 97.8 °F (36.6 °C) (Temporal)   Ht 168.9 cm (66.5\")   Wt 96.3 kg (212 lb 3.2 oz)   SpO2 97%   BMI 33.74 kg/m²      BMI is >= 30 and <35. (Class 1 Obesity). The following options were offered after discussion;: exercise counseling/recommendations and nutrition counseling/recommendations       Physical Exam  Vitals and nursing note reviewed.   Constitutional:       General: She is not in acute distress.     Appearance: She is well-developed. She is obese. She is not diaphoretic.   HENT:      Head: Normocephalic and atraumatic.   Eyes:      Conjunctiva/sclera: Conjunctivae normal.   Pulmonary:      Effort: Pulmonary effort is normal.   Abdominal:      General: Bowel sounds are normal. There is no distension.      Palpations: Abdomen is soft. There is no mass.      Tenderness: There is no abdominal tenderness. There is no right CVA tenderness, left CVA tenderness, guarding or rebound.      Comments: No abdominal tenderness with palpation.  Negative bilateral CVA tenderness.   Musculoskeletal:         General: Normal range of motion.      Cervical back: Neck supple.   Skin:     General: Skin is warm and dry.      Findings: No rash.   Neurological:      Mental Status: She is alert and oriented to person, place, and time.      Deep Tendon Reflexes: Reflexes are normal and symmetric.   Psychiatric:         Mood and Affect: Mood normal.          The following data was reviewed by: KANCHAN Marvin on 05/16/2024:  POC Urinalysis Dipstick, Automated (04/18/2024 09:35)  Urine Culture - Urine, Urine, Clean Catch (04/18/2024 14:02)  Office Visit with Opal Coffey APRN (04/18/2024)   POCT urinalysis dipstick, manual (05/16/2024 09:59)              Assessment and Plan      Assessment & Plan  Acute cystitis with hematuria  Improving  Repeat urinalysis in " office today shows improvement  Urine culture specimen sent to lab  Continue to drink plenty of water  Maintain good urinary hygiene  Follow-up as needed  Urine frequency      Orders Placed This Encounter   Procedures    Urine Culture - Urine, Urine, Clean Catch    POCT urinalysis dipstick, manual                 Follow Up     Return if symptoms worsen or fail to improve.    Patient was given instructions and counseling regarding her condition or for health maintenance advice. Please see specific information pulled into the AVS if appropriate.

## 2024-05-18 LAB
BACTERIA UR CULT: NO GROWTH
BACTERIA UR CULT: NORMAL

## 2024-05-20 ENCOUNTER — TELEPHONE (OUTPATIENT)
Dept: FAMILY MEDICINE CLINIC | Facility: CLINIC | Age: 69
End: 2024-05-20
Payer: MEDICARE

## 2024-05-20 NOTE — TELEPHONE ENCOUNTER
Urine culture is normal with no bacterial growth.  Maintain good urinary hygiene and drink plenty of water.

## 2024-07-16 ENCOUNTER — OFFICE VISIT (OUTPATIENT)
Dept: FAMILY MEDICINE CLINIC | Facility: CLINIC | Age: 69
End: 2024-07-16
Payer: MEDICARE

## 2024-07-16 VITALS
OXYGEN SATURATION: 99 % | BODY MASS INDEX: 34.1 KG/M2 | SYSTOLIC BLOOD PRESSURE: 126 MMHG | DIASTOLIC BLOOD PRESSURE: 84 MMHG | HEIGHT: 66 IN | WEIGHT: 212.2 LBS | HEART RATE: 72 BPM | RESPIRATION RATE: 17 BRPM | TEMPERATURE: 98.3 F

## 2024-07-16 DIAGNOSIS — R10.9 LEFT FLANK PAIN: Primary | ICD-10-CM

## 2024-07-16 DIAGNOSIS — R31.9 HEMATURIA, UNSPECIFIED TYPE: ICD-10-CM

## 2024-07-16 LAB
BILIRUB BLD-MCNC: NEGATIVE MG/DL
CLARITY, POC: CLEAR
COLOR UR: ABNORMAL
GLUCOSE UR STRIP-MCNC: NEGATIVE MG/DL
KETONES UR QL: NEGATIVE
LEUKOCYTE EST, POC: NEGATIVE
NITRITE UR-MCNC: NEGATIVE MG/ML
PH UR: 5.5 [PH] (ref 5–8)
PROT UR STRIP-MCNC: NEGATIVE MG/DL
RBC # UR STRIP: ABNORMAL /UL
SP GR UR: 1.03 (ref 1–1.03)
UROBILINOGEN UR QL: ABNORMAL

## 2024-07-16 PROCEDURE — 1126F AMNT PAIN NOTED NONE PRSNT: CPT

## 2024-07-16 PROCEDURE — 81002 URINALYSIS NONAUTO W/O SCOPE: CPT

## 2024-07-16 PROCEDURE — 99213 OFFICE O/P EST LOW 20 MIN: CPT

## 2024-07-16 PROCEDURE — 1159F MED LIST DOCD IN RCRD: CPT

## 2024-07-16 PROCEDURE — 1160F RVW MEDS BY RX/DR IN RCRD: CPT

## 2024-07-16 NOTE — PROGRESS NOTES
"Chief Complaint  Flank Pain    Subjective          Back Pain  Associated symptoms include abdominal pain (left lower). Pertinent negatives include no chest pain, dysuria, fever or pelvic pain.       Oneida See 69 y.o.female complains of urinary symptoms. She complains of left flank pain on the left and lower abdominal pain. She has had symptoms for 3 weeks for back pain and 2 weeks for lower abdominal pain. The symptoms are moderate.  Patient denies fever, gross blood in urine, urgency, frequency, dysuria, nausea, vomiting, and risk of STD.  Patient has tried  increasing fluids and OTC bladder analgesic for relief of symptoms.  Patient does not have a history of recurrent UTI. Patient does not have a history of pyelonephritis.      Review of Systems   Constitutional:  Negative for chills, fatigue and fever.   HENT:  Negative for congestion and sinus pressure.    Eyes:  Negative for visual disturbance.   Respiratory:  Negative for cough and shortness of breath.    Cardiovascular:  Negative for chest pain and palpitations.   Gastrointestinal:  Positive for abdominal pain (left lower). Negative for constipation, diarrhea, nausea and vomiting.   Genitourinary:  Positive for flank pain (left-sided). Negative for decreased urine volume, difficulty urinating, dysuria, frequency, hematuria, pelvic pain, urgency and vaginal bleeding.   Musculoskeletal:  Negative for back pain.   Skin:  Negative for rash.   Neurological:  Negative for dizziness, syncope and light-headedness.   Psychiatric/Behavioral:  Negative for behavioral problems and sleep disturbance.         Objective   Vital Signs:   /84 (BP Location: Left arm, Patient Position: Sitting, Cuff Size: Small Adult)   Pulse 72   Temp 98.3 °F (36.8 °C)   Resp 17   Ht 168.9 cm (66.5\")   Wt 96.3 kg (212 lb 3.2 oz)   SpO2 99%   BMI 33.74 kg/m²        Physical Exam  Vitals and nursing note reviewed.   Constitutional:       General: She is not in acute " distress.     Appearance: She is well-developed. She is not ill-appearing or diaphoretic.   HENT:      Head: Normocephalic and atraumatic.   Eyes:      Conjunctiva/sclera: Conjunctivae normal.   Pulmonary:      Effort: Pulmonary effort is normal. No respiratory distress.      Breath sounds: Normal breath sounds.   Abdominal:      General: Bowel sounds are normal. There is no distension.      Palpations: Abdomen is soft. There is no mass.      Tenderness: There is no abdominal tenderness. There is no right CVA tenderness, left CVA tenderness, guarding or rebound.      Comments: No abdominal tenderness with palpation. Negative bilateral CVA tenderness with palpation. Abdomen palpates soft.    Musculoskeletal:         General: Normal range of motion.      Cervical back: Neck supple.   Skin:     General: Skin is warm and dry.      Findings: No rash.   Neurological:      Mental Status: She is alert and oriented to person, place, and time.      Deep Tendon Reflexes: Reflexes are normal and symmetric.   Psychiatric:         Mood and Affect: Mood normal.          The following data was reviewed by: KANCHAN Marvin on 07/16/2024:  POCT urinalysis dipstick, manual (07/16/2024 16:44)              Assessment and Plan      Assessment & Plan  Left flank pain  No signs of UTI with urine dip today  Urine culture and microscopic UA ordered  CT abdomen/pelvis ordered for possible stone  Increase water intake  Return if symptoms worsen    Hematuria, unspecified type  No signs of UTI with urine dip today  Urine culture and microscopic UA ordered  CT abdomen/pelvis ordered for possible stone  Increase water intake  Return if symptoms worsen    Orders Placed This Encounter   Procedures    CT Abdomen Pelvis Stone Protocol    Urine Culture, Comprehen ([facility]) - Urine, Clean Catch    POCT urinalysis dipstick, manual    Urinalysis With Microscopic - Urine, Clean Catch                 Follow Up     Return if symptoms worsen or fail to  improve.    Patient was given instructions and counseling regarding her condition or for health maintenance advice. Please see specific information pulled into the AVS if appropriate.

## 2024-07-18 DIAGNOSIS — E03.9 PRIMARY HYPOTHYROIDISM: ICD-10-CM

## 2024-07-18 RX ORDER — LEVOTHYROXINE SODIUM 125 MCG
125 TABLET ORAL DAILY
Qty: 90 TABLET | Refills: 2 | OUTPATIENT
Start: 2024-07-18

## 2024-07-23 LAB
APPEARANCE UR: ABNORMAL
BACTERIA #/AREA URNS HPF: ABNORMAL /[HPF]
BACTERIA UR CULT: ABNORMAL
BILIRUB UR QL STRIP: NEGATIVE
CASTS URNS QL MICRO: ABNORMAL /LPF
COLOR UR: YELLOW
CRYSTALS URNS MICRO: ABNORMAL
EPI CELLS #/AREA URNS HPF: ABNORMAL /HPF (ref 0–10)
GLUCOSE UR QL STRIP: NEGATIVE
HGB UR QL STRIP: ABNORMAL
KETONES UR QL STRIP: NEGATIVE
LEUKOCYTE ESTERASE UR QL STRIP: ABNORMAL
MICRO URNS: ABNORMAL
MUCOUS THREADS URNS QL MICRO: PRESENT
NITRITE UR QL STRIP: NEGATIVE
OTHER ANTIBIOTIC SUSC ISLT: ABNORMAL
PH UR STRIP: 5.5 [PH] (ref 5–7.5)
PROT UR QL STRIP: ABNORMAL
RBC #/AREA URNS HPF: ABNORMAL /HPF (ref 0–2)
SP GR UR STRIP: 1.02 (ref 1–1.03)
UNIDENT CRYS URNS QL MICRO: PRESENT
UROBILINOGEN UR STRIP-MCNC: 0.2 MG/DL (ref 0.2–1)
WBC #/AREA URNS HPF: ABNORMAL /HPF (ref 0–5)

## 2024-07-25 DIAGNOSIS — A49.8 CITROBACTER INFECTION: Primary | ICD-10-CM

## 2024-07-25 RX ORDER — CIPROFLOXACIN 500 MG/1
500 TABLET, FILM COATED ORAL 2 TIMES DAILY
Qty: 10 TABLET | Refills: 0 | Status: SHIPPED | OUTPATIENT
Start: 2024-07-25 | End: 2024-07-30

## 2024-07-31 ENCOUNTER — TELEPHONE (OUTPATIENT)
Dept: FAMILY MEDICINE CLINIC | Facility: CLINIC | Age: 69
End: 2024-07-31
Payer: MEDICARE

## 2024-07-31 NOTE — TELEPHONE ENCOUNTER
Caller: Oneida See    Relationship: Self    Best call back number: [phone]     What is the best time to reach you: ANY    Who are you requesting to speak with (clinical staff, provider,  specific staff member): PROVIDER     Do you know the name of the person who called: ONEIDA    What was the call regarding: PATIENT IS REQUESTING A CALL BACK FROM PROVIDER . PATIENT HAS MULTIPLE MEDICAL CONCERNS. PLEASE CALL PATIENT TO ADVISE .    Is it okay if the provider responds through MyChart:

## 2024-07-31 NOTE — TELEPHONE ENCOUNTER
Called and lvm for pt to return call to let me know her concerns. I also let her know that if she has multiple concerns, it would be best to make an appointment.

## 2024-08-01 DIAGNOSIS — E55.9 VITAMIN D DEFICIENCY: ICD-10-CM

## 2024-08-01 RX ORDER — CHOLECALCIFEROL (VITAMIN D3) 1250 MCG
CAPSULE ORAL
Qty: 12 CAPSULE | Refills: 1 | Status: SHIPPED | OUTPATIENT
Start: 2024-08-01

## 2024-08-02 ENCOUNTER — TELEPHONE (OUTPATIENT)
Dept: FAMILY MEDICINE CLINIC | Facility: CLINIC | Age: 69
End: 2024-08-02
Payer: MEDICARE

## 2024-08-02 NOTE — TELEPHONE ENCOUNTER
Spoke with patient to inform her per her provider Halle Walton Take Cipro for 5 days only. The urine culture results did not take 11 days. I explained to her the lab tests the urine culture for 3 days. Also, it takes time for the lab to receive the specimen and to send results back to me. I agree, she should contact the pharmacy to fill one of the refills for Synthroid. I sent a prescription to [facility] for vitamin D. Patient voiced understanding and had no further question for our office

## 2024-08-05 ENCOUNTER — HOSPITAL ENCOUNTER (OUTPATIENT)
Facility: HOSPITAL | Age: 69
Discharge: HOME | End: 2024-08-05
Payer: MEDICARE

## 2024-08-05 PROCEDURE — 74176 CT ABD & PELVIS W/O CONTRAST: CPT

## 2024-10-22 ENCOUNTER — OFFICE VISIT (OUTPATIENT)
Dept: FAMILY MEDICINE CLINIC | Facility: CLINIC | Age: 69
End: 2024-10-22
Payer: MEDICARE

## 2024-10-22 VITALS
TEMPERATURE: 97.9 F | OXYGEN SATURATION: 98 % | WEIGHT: 208.2 LBS | SYSTOLIC BLOOD PRESSURE: 114 MMHG | HEART RATE: 74 BPM | DIASTOLIC BLOOD PRESSURE: 66 MMHG | HEIGHT: 67 IN | BODY MASS INDEX: 32.68 KG/M2

## 2024-10-22 DIAGNOSIS — Z00.00 MEDICARE ANNUAL WELLNESS VISIT, SUBSEQUENT: Primary | ICD-10-CM

## 2024-10-22 DIAGNOSIS — E03.9 PRIMARY HYPOTHYROIDISM: ICD-10-CM

## 2024-10-22 DIAGNOSIS — L65.9 HAIR LOSS: ICD-10-CM

## 2024-10-22 DIAGNOSIS — E55.9 VITAMIN D DEFICIENCY: ICD-10-CM

## 2024-10-22 PROCEDURE — 1170F FXNL STATUS ASSESSED: CPT

## 2024-10-22 PROCEDURE — 1125F AMNT PAIN NOTED PAIN PRSNT: CPT

## 2024-10-22 PROCEDURE — G0439 PPPS, SUBSEQ VISIT: HCPCS

## 2024-10-22 PROCEDURE — 1160F RVW MEDS BY RX/DR IN RCRD: CPT

## 2024-10-22 PROCEDURE — 99213 OFFICE O/P EST LOW 20 MIN: CPT

## 2024-10-22 PROCEDURE — 1159F MED LIST DOCD IN RCRD: CPT

## 2024-10-22 PROCEDURE — 96160 PT-FOCUSED HLTH RISK ASSMT: CPT

## 2024-10-22 RX ORDER — CHOLECALCIFEROL (VITAMIN D3) 1250 MCG
CAPSULE ORAL
Qty: 13 CAPSULE | Refills: 1 | Status: SHIPPED | OUTPATIENT
Start: 2024-10-22

## 2024-10-22 RX ORDER — LEVOTHYROXINE SODIUM 125 MCG
125 TABLET ORAL DAILY
Qty: 90 TABLET | Refills: 1 | Status: SHIPPED | OUTPATIENT
Start: 2024-10-22

## 2024-10-22 NOTE — PATIENT INSTRUCTIONS
Medicare Wellness  Personal Prevention Plan of Service     Date of Office Visit:    Encounter Provider:  KANCHAN Marvin  Place of Service:  [facility]  Patient Name: Oneida See  :  1955    As part of the Medicare Wellness portion of your visit today, we are providing you with this personalized preventive plan of services (PPPS). This plan is based upon recommendations of the [place] Preventive Services Task Force (USPSTF) and the Advisory Committee on Immunization Practices (ACIP).    This lists the preventive care services that should be considered, and provides dates of when you are due. Items listed as completed are up-to-date and do not require any further intervention.    Health Maintenance   Topic Date Due    DXA SCAN  2018    LIPID PANEL  2024    COVID-19 Vaccine ( - -24 season) 10/24/2024 (Originally 2024)    Pneumococcal Vaccine 65+ (1 of 1 - PCV) 2024 (Originally 2019)    TDAP/TD VACCINES (2 - Td or Tdap) 2024 (Originally 9/10/2023)    ZOSTER VACCINE (1 of 2) 2024 (Originally 2004)    ANNUAL WELLNESS VISIT  10/22/2025    BMI FOLLOWUP  10/22/2025    COLORECTAL CANCER SCREENING  2026    HEPATITIS C SCREENING  Addressed    INFLUENZA VACCINE  Discontinued    MAMMOGRAM  Discontinued    PAP SMEAR  Discontinued       Orders Placed This Encounter   Procedures    Comprehensive metabolic panel     Order Specific Question:   Release to patient     Answer:   Routine Release [[id number]]    Lipid panel     Order Specific Question:   Release to patient     Answer:   Routine Release [[id number]]    TSH     Order Specific Question:   Release to patient     Answer:   Routine Release [[id number]]    T4, Free     Order Specific Question:   Release to patient     Answer:   Routine Release [[id number]]    Vitamin D 25 hydroxy     Order Specific Question:   Release to patient     Answer:   Routine Release [[id number]]     CBC and Differential     Order Specific Question:   Manual Differential     Answer:   No     Order Specific Question:   Release to patient     Answer:   Routine Release [[id number]]       Return in about 6 months (around 4/22/2025) for Next scheduled follow up.

## 2024-10-22 NOTE — ASSESSMENT & PLAN NOTE
Health maintenance updated, labs ordered  Work on healthy diet, exercise, weight loss  Return in 1 year for annual Medicare wellness

## 2024-10-22 NOTE — PROGRESS NOTES
Subjective   The ABCs of the Annual Wellness Visit  Medicare Wellness Visit      Oneida See is a 69 y.o. patient who presents for a Medicare Wellness Visit.    The following portions of the patient's history were reviewed and   updated as appropriate: allergies, current medications, past family history, past medical history, past social history, past surgical history, and problem list.    Compared to one year ago, the patient's physical   health is the same.  Compared to one year ago, the patient's mental   health is the same.    Recent Hospitalizations:  She was not admitted to the hospital during the last year.     Current Medical Providers:  Patient Care Team:  Halle Walton APRN as PCP - General (Family Medicine)  Halle Walton APRN as Nurse Practitioner (Family Medicine)  Mary Ellen Sage MD as Consulting Physician (Otolaryngology)  Victor Manuel Austin MD as Consulting Physician (Pulmonary Disease)    Outpatient Medications Prior to Visit   Medication Sig Dispense Refill    Ascorbic Acid (VITAMIN C PO) Take 1 tablet by mouth Daily.      Multiple Vitamins-Minerals (ZINC PO) Take 1 tablet by mouth Daily. HOLD FOR ONE WEEK PRIOR TO SURGERY      Vitamin Mixture (VITAMIN E COMPLETE PO) Take 1 tablet by mouth Daily. Pt stated to hold 1 week prior to surgery      Cholecalciferol (Vitamin D3) 1.25 MG (40979 UT) capsule Take one capsule by mouth once every week. 12 capsule 1    Synthroid 125 MCG tablet Take 1 tablet by mouth Daily. 90 tablet 2     No facility-administered medications prior to visit.     No opioid medication identified on active medication list. I have reviewed chart for other potential  high risk medication/s and harmful drug interactions in the elderly.      Aspirin is not on active medication list.  Aspirin use is not indicated based on review of current medical condition/s. Risk of harm outweighs potential benefits.  .    Patient Active Problem List   Diagnosis    Adult hypothyroidism    LBP  "(low back pain)    Chronic neck pain    DUSTIN (obstructive sleep apnea)    Vitamin D deficiency    Medicare annual wellness visit, subsequent    HPV in female    Colon polyp    Gastroesophageal reflux disease without esophagitis    Thoracic radiculopathy    Slow transit constipation    Asymptomatic microscopic hematuria    Chest wall pain    Degenerative joint disease (DJD) of hip    DJD (degenerative joint disease)    Multiple acquired skin tags    Seborrheic keratosis    Discolored skin    Medically noncompliant    Parent refuses immunizations    Cough    Acute maxillary sinusitis    Postmenopausal    Mixed hyperlipidemia    Chronic fatigue    Acute cystitis with hematuria     Advance Care Planning Advance Directive is not on file.  ACP discussion was declined by the patient. Patient does not have an advance directive, declines further assistance.            Objective   Vitals:    10/22/24 0828   BP: 114/66   BP Location: Left arm   Patient Position: Sitting   Cuff Size: Large Adult   Pulse: 74   Temp: 97.9 °F (36.6 °C)   TempSrc: Oral   SpO2: 98%   Weight: 94.4 kg (208 lb 3.2 oz)   Height: 168.9 cm (66.5\")   PainSc:   4   PainLoc: Back       Estimated body mass index is 33.1 kg/m² as calculated from the following:    Height as of this encounter: 168.9 cm (66.5\").    Weight as of this encounter: 94.4 kg (208 lb 3.2 oz).            Does the patient have evidence of cognitive impairment? No  ACE III MINI Score: 21                                                                                                Health  Risk Assessment    Smoking Status:  Social History     Tobacco Use   Smoking Status Former    Current packs/day: 0.00    Average packs/day: 0.2 packs/day for 10.0 years (1.5 ttl pk-yrs)    Types: Cigarettes    Start date:     Quit date:     Years since quittin.8    Passive exposure: Past   Smokeless Tobacco Never   Tobacco Comments    social      Alcohol Consumption:  Social History "     Substance and Sexual Activity   Alcohol Use Yes    Comment: occasionally       Fall Risk Screen  STEADI Fall Risk Assessment was completed, and patient is at LOW risk for falls.Assessment completed on:10/22/2024    Depression Screening:      10/22/2024     8:33 AM   PHQ-2/PHQ-9 Depression Screening   Little interest or pleasure in doing things Not at all   Feeling down, depressed, or hopeless Not at all     Health Habits and Functional and Cognitive Screening:      10/22/2024     8:31 AM   Functional & Cognitive Status   Do you have difficulty preparing food and eating? No   Do you have difficulty bathing yourself, getting dressed or grooming yourself? No   Do you have difficulty using the toilet? No   Do you have difficulty moving around from place to place? No   Do you have trouble with steps or getting out of a bed or a chair? No   Current Diet Well Balanced Diet   Dental Exam Not up to date   Eye Exam Up to date   Exercise (times per week) 4 times per week   Current Exercises Include Treadmill   Do you need help using the phone?  No   Are you deaf or do you have serious difficulty hearing?  Yes   Do you need help to go to places out of walking distance? No   Do you need help shopping? No   Do you need help preparing meals?  No   Do you need help with housework?  No   Do you need help with laundry? No   Do you need help taking your medications? No   Do you need help managing money? No   Do you ever drive or ride in a car without wearing a seat belt? No   Have you felt unusual stress, anger or loneliness in the last month? No   Who do you live with? Spouse   If you need help, do you have trouble finding someone available to you? No   Have you been bothered in the last four weeks by sexual problems? No   Do you have difficulty concentrating, remembering or making decisions? No           Age-appropriate Screening Schedule:  Refer to the list below for future screening recommendations based on patient's age, sex  and/or medical conditions. Orders for these recommended tests are listed in the plan section. The patient has been provided with a written plan.    Health Maintenance List  Health Maintenance   Topic Date Due    DXA SCAN  12/12/2018    LIPID PANEL  08/30/2024    COVID-19 Vaccine (1 - 2023-24 season) 10/24/2024 (Originally 9/1/2024)    Pneumococcal Vaccine 65+ (1 of 1 - PCV) 11/29/2024 (Originally 2/9/2019)    TDAP/TD VACCINES (2 - Td or Tdap) 12/02/2024 (Originally 9/10/2023)    ZOSTER VACCINE (1 of 2) 12/02/2024 (Originally 2/9/2004)    ANNUAL WELLNESS VISIT  10/22/2025    BMI FOLLOWUP  10/22/2025    COLORECTAL CANCER SCREENING  12/19/2026    HEPATITIS C SCREENING  Addressed    INFLUENZA VACCINE  Discontinued    MAMMOGRAM  Discontinued    PAP SMEAR  Discontinued                                                                                                                                                CMS Preventative Services Quick Reference  Risk Factors Identified During Encounter  Hearing Problem:  She was evaluated by Dr. Alona Hyde, ENT a few months ago.  Dental Screening Recommended    The above risks/problems have been discussed with the patient.  Pertinent information has been shared with the patient in the After Visit Summary.  An After Visit Summary and PPPS were made available to the patient.    Follow Up:   Next Medicare Wellness visit to be scheduled in 1 year.         Additional E&M Note during same encounter follows:  Patient has additional, significant, and separately identifiable condition(s)/problem(s) that require work above and beyond the Medicare Wellness Visit     Chief Complaint  Medicare Wellness-subsequent    Subjective     HPI    Oneida is also being seen today for additional medical problem/s.    Ms. See is also here for medical management. Since the last visit, she has overall felt well. She has Hypothyroidism well controlled on current medication and will continue Rx. She has  "been compliant with current medications, and I have reviewed them. The patient denies medication side effects. Will refill medications.     She also reports hair loss.  Symptom onset was beginning of this year.  She is compliant with taking Synthroid 125 mcg daily and does not miss any doses.  She does not take a daily multivitamin, but does take vitamin D weekly.  She does not supplement with biotin.        Review of Systems   Constitutional:  Negative for chills, fatigue and fever.   HENT:  Negative for congestion and sinus pressure.         Hair loss   Eyes:  Negative for visual disturbance.   Respiratory:  Negative for cough, chest tightness and shortness of breath.    Cardiovascular:  Negative for chest pain and palpitations.   Gastrointestinal:  Negative for abdominal pain, constipation, diarrhea, nausea and vomiting.   Genitourinary:  Negative for dysuria, frequency and urgency.   Musculoskeletal:  Negative for back pain.   Skin:  Negative for rash.   Neurological:  Negative for dizziness, syncope and light-headedness.   Hematological:  Does not bruise/bleed easily.   Psychiatric/Behavioral:  Negative for behavioral problems and sleep disturbance.               Objective   Vital Signs:  /66 (BP Location: Left arm, Patient Position: Sitting, Cuff Size: Large Adult)   Pulse 74   Temp 97.9 °F (36.6 °C) (Oral)   Ht 168.9 cm (66.5\")   Wt 94.4 kg (208 lb 3.2 oz)   SpO2 98%   BMI 33.10 kg/m²     BMI is >= 30 and <35. (Class 1 Obesity). The following options were offered after discussion;: exercise counseling/recommendations and nutrition counseling/recommendations     Physical Exam  Vitals and nursing note reviewed.   Constitutional:       General: She is not in acute distress.     Appearance: She is well-developed. She is obese.   HENT:      Head: Normocephalic. Hair is normal.   Eyes:      General: No scleral icterus.     Pupils: Pupils are equal, round, and reactive to light.   Neck:      Thyroid: No " thyromegaly.      Vascular: No carotid bruit.   Cardiovascular:      Rate and Rhythm: Normal rate and regular rhythm.      Heart sounds: Normal heart sounds.   Pulmonary:      Effort: Pulmonary effort is normal. No respiratory distress.      Breath sounds: Normal breath sounds. No stridor.   Musculoskeletal:         General: No deformity.   Skin:     General: Skin is warm.      Coloration: Skin is not jaundiced.   Neurological:      General: No focal deficit present.      Mental Status: She is alert and oriented to person, place, and time.      Motor: No weakness.      Gait: Gait normal.      Comments: The patient's gait is steady and unassisted.    Psychiatric:         Behavior: Behavior normal.         Thought Content: Thought content normal.         Judgment: Judgment normal.         The following data was reviewed by: KANCHAN Marvin on 10/22/2024:  T4, Free (08/30/2023 08:28)  TSH (08/30/2023 08:28)        Assessment and Plan               Medicare annual wellness visit, subsequent  Health maintenance updated, labs ordered  Work on healthy diet, exercise, weight loss  Return in 1 year for annual Medicare wellness  Primary hypothyroidism  Previous labs normal  Continue Synthroid daily  Thyroid labs ordered  Follow-up in 6 months  Vitamin D deficiency  Continue vitamin D weekly  Hair loss  New problem  Continue Synthroid daily as directed  Thyroid labs ordered  Start Biotin 5000 mcg per day and daily multivitamin    Orders Placed This Encounter   Procedures    Comprehensive metabolic panel     Order Specific Question:   Release to patient     Answer:   Routine Release [[id number]]    Lipid panel     Order Specific Question:   Release to patient     Answer:   Routine Release [[id number]]    TSH     Order Specific Question:   Release to patient     Answer:   Routine Release [[id number]]    T4, Free     Order Specific Question:   Release to patient     Answer:   Routine Release [[id number]]    Vitamin D 25  hydroxy     Order Specific Question:   Release to patient     Answer:   Routine Release [[id number]]    CBC and Differential     Order Specific Question:   Manual Differential     Answer:   No     Order Specific Question:   Release to patient     Answer:   Routine Release [[id number]]     New Medications Ordered This Visit   Medications    Cholecalciferol (Vitamin D3) 1.25 MG (45719 UT) capsule     Sig: Take one capsule by mouth once every week.     Dispense:  13 capsule     Refill:  1    Synthroid 125 MCG tablet     Sig: Take 1 tablet by mouth Daily.     Dispense:  90 tablet     Refill:  1     Return in about 6 months (around 5/29/2024) for Next scheduled follow up.            Follow Up     Return in about 6 months (around 4/22/2025) for Next scheduled follow up.    Patient was given instructions and counseling regarding her condition or for health maintenance advice. Please see specific information pulled into the AVS if appropriate.

## 2024-10-25 ENCOUNTER — TELEPHONE (OUTPATIENT)
Dept: FAMILY MEDICINE CLINIC | Facility: CLINIC | Age: 69
End: 2024-10-25
Payer: MEDICARE

## 2024-10-25 NOTE — TELEPHONE ENCOUNTER
Pt has decided to make appt for further discussion and tx. She wants PCP to see what she is actually talking about.

## 2024-10-25 NOTE — TELEPHONE ENCOUNTER
Caller: Oneida See     Relationship: SELF    Best call back number: [phone]     What is your medical concern? PATIENT STATES THAT IT FEELS LIKE HER HEAD IS SITTING ON HER SHOULDERS AND NOT HER NECK.     How long has this issue been going on? A LITTLE WHILE, HAS GOTTEN WORSE    Is your provider already aware of this issue? YES    Have you been treated for this issue? NO    PATIENT WOULD LIKE TO KNOW IF POSSIBLY SEEING A CHIROPRACTOR WOULD BE SOMETHING [facility] WOULD RECOMMEND.     PLEASE CALL PATIENT TO DISCUSS AND ADVISE.

## 2024-10-28 ENCOUNTER — TELEPHONE (OUTPATIENT)
Dept: FAMILY MEDICINE CLINIC | Facility: CLINIC | Age: 69
End: 2024-10-28
Payer: MEDICARE

## 2024-10-28 NOTE — TELEPHONE ENCOUNTER
Caller: Oneida See     Relationship: SELF    Best call back number: [phone]     What is your medical concern? PATIENT WOULD LIKE TO SEE IF SHE CAN HAVE HER HEAVY METAL BLOOD WORK DONE FROM [facility] WHEN SHE HAS HER BLOOD WORK DONE IN OFFICE.     PLEASE CALL PATIENT TO LET HER KNOW.

## 2024-10-29 NOTE — TELEPHONE ENCOUNTER
Called and spoke with pt, informed her of provider's note. Pt stated someone else called her and informed her we do not do outside labs, and to go to [facility].

## 2024-11-09 LAB
25(OH)D3+25(OH)D2 SERPL-MCNC: 44.1 NG/ML (ref 30–100)
ALBUMIN SERPL-MCNC: 4.1 G/DL (ref 3.9–4.9)
ALP SERPL-CCNC: 97 IU/L (ref 44–121)
ALT SERPL-CCNC: 14 IU/L (ref 0–32)
AST SERPL-CCNC: 17 IU/L (ref 0–40)
BASOPHILS # BLD AUTO: 0 X10E3/UL (ref 0–0.2)
BASOPHILS NFR BLD AUTO: 1 %
BILIRUB SERPL-MCNC: 0.5 MG/DL (ref 0–1.2)
BUN SERPL-MCNC: 19 MG/DL (ref 8–27)
BUN/CREAT SERPL: 27 (ref 12–28)
CALCIUM SERPL-MCNC: 9.4 MG/DL (ref 8.7–10.3)
CHLORIDE SERPL-SCNC: 104 MMOL/L (ref 96–106)
CHOLEST SERPL-MCNC: 201 MG/DL (ref 100–199)
CO2 SERPL-SCNC: 24 MMOL/L (ref 20–29)
CREAT SERPL-MCNC: 0.71 MG/DL (ref 0.57–1)
EGFRCR SERPLBLD CKD-EPI 2021: 91 ML/MIN/1.73
EOSINOPHIL # BLD AUTO: 0.1 X10E3/UL (ref 0–0.4)
EOSINOPHIL NFR BLD AUTO: 2 %
ERYTHROCYTE [DISTWIDTH] IN BLOOD BY AUTOMATED COUNT: 12.8 % (ref 11.7–15.4)
GLOBULIN SER CALC-MCNC: 2.4 G/DL (ref 1.5–4.5)
GLUCOSE SERPL-MCNC: 95 MG/DL (ref 70–99)
HCT VFR BLD AUTO: 40.5 % (ref 34–46.6)
HDLC SERPL-MCNC: 63 MG/DL
HGB BLD-MCNC: 13.3 G/DL (ref 11.1–15.9)
IMM GRANULOCYTES # BLD AUTO: 0 X10E3/UL (ref 0–0.1)
IMM GRANULOCYTES NFR BLD AUTO: 0 %
LDLC SERPL CALC-MCNC: 123 MG/DL (ref 0–99)
LYMPHOCYTES # BLD AUTO: 1.6 X10E3/UL (ref 0.7–3.1)
LYMPHOCYTES NFR BLD AUTO: 29 %
MCH RBC QN AUTO: 30.6 PG (ref 26.6–33)
MCHC RBC AUTO-ENTMCNC: 32.8 G/DL (ref 31.5–35.7)
MCV RBC AUTO: 93 FL (ref 79–97)
MONOCYTES # BLD AUTO: 0.5 X10E3/UL (ref 0.1–0.9)
MONOCYTES NFR BLD AUTO: 8 %
NEUTROPHILS # BLD AUTO: 3.5 X10E3/UL (ref 1.4–7)
NEUTROPHILS NFR BLD AUTO: 60 %
PLATELET # BLD AUTO: 296 X10E3/UL (ref 150–450)
POTASSIUM SERPL-SCNC: 4.6 MMOL/L (ref 3.5–5.2)
PROT SERPL-MCNC: 6.5 G/DL (ref 6–8.5)
RBC # BLD AUTO: 4.35 X10E6/UL (ref 3.77–5.28)
SODIUM SERPL-SCNC: 141 MMOL/L (ref 134–144)
T4 FREE SERPL-MCNC: 1.51 NG/DL (ref 0.82–1.77)
TRIGL SERPL-MCNC: 86 MG/DL (ref 0–149)
TSH SERPL DL<=0.005 MIU/L-ACNC: 0.32 UIU/ML (ref 0.45–4.5)
VLDLC SERPL CALC-MCNC: 15 MG/DL (ref 5–40)
WBC # BLD AUTO: 5.7 X10E3/UL (ref 3.4–10.8)

## 2024-11-10 DIAGNOSIS — E03.9 HYPOTHYROIDISM, UNSPECIFIED TYPE: Primary | ICD-10-CM

## 2024-11-10 RX ORDER — LEVOTHYROXINE SODIUM 112 MCG
112 TABLET ORAL DAILY
Qty: 60 TABLET | Refills: 0 | Status: SHIPPED | OUTPATIENT
Start: 2024-11-10

## 2024-11-11 ENCOUNTER — TELEPHONE (OUTPATIENT)
Dept: FAMILY MEDICINE CLINIC | Facility: CLINIC | Age: 69
End: 2024-11-11
Payer: MEDICARE

## 2024-11-14 DIAGNOSIS — E03.9 HYPOTHYROIDISM, UNSPECIFIED TYPE: ICD-10-CM

## 2024-11-14 NOTE — TELEPHONE ENCOUNTER
Caller: Oneida See    Relationship: Self    Best call back number:[phone]      Requested Prescriptions:   Requested Prescriptions     Pending Prescriptions Disp Refills    Synthroid 112 MCG tablet 60 tablet 0     Sig: Take 1 tablet by mouth Daily.        Pharmacy where request should be sent: [facility] [id number] [facility] - [phone]  - [phone] FX     Last office visit with prescribing clinician: 10/22/2024   Last telemedicine visit with prescribing clinician: Visit date not found   Next office visit with prescribing clinician: 4/22/2025     Additional details provided by patient: SHE WOULD LIKE TO GET 90 PILLS AS IT IS THE SAME COST FOR 60 AS IT IS FOR 90.      Does the patient have less than a 3 day supply:  [x] Yes  [] No    Would you like a call back once the refill request has been completed: [] Yes [x] No    If the office needs to give you a call back, can they leave a voicemail: [] Yes [x] No    Rossana Bermudez Rep   11/14/24 12:33 EST

## 2024-11-15 RX ORDER — LEVOTHYROXINE SODIUM 112 MCG
112 TABLET ORAL DAILY
Qty: 90 TABLET | Refills: 0 | OUTPATIENT
Start: 2024-11-15

## 2024-11-20 ENCOUNTER — TELEPHONE (OUTPATIENT)
Dept: FAMILY MEDICINE CLINIC | Facility: CLINIC | Age: 69
End: 2024-11-20

## 2024-11-20 ENCOUNTER — TELEPHONE (OUTPATIENT)
Dept: FAMILY MEDICINE CLINIC | Facility: CLINIC | Age: 69
End: 2024-11-20
Payer: MEDICARE

## 2024-11-20 DIAGNOSIS — E03.9 HYPOTHYROIDISM, UNSPECIFIED TYPE: ICD-10-CM

## 2024-11-20 NOTE — TELEPHONE ENCOUNTER
Caller: CHARITY MARIE    Relationship:SELF    Callback number: [phone]   Is it ok to leave a message: [x] Yes [] No    Requested medication for samples: Synthroid 112 MCG tablet     How much medication does the patient currently have left: NONE OF THIS DOSAGE    Who will be picking up the samples: PATIENT    Do you need information about patient financial assistance for this medication: [] Yes [] No    Additional details provided: PATIENT REQUESTED A 3 MONTH SUPPLY THOUGH MAIL DELIVERY TODAY 11/20/24 BUT DOES NOT HAVE ANY. PATIENT STATED SHE DOES NOT GET PAID UNTIL 12/2/24 SO IS REQUESTING IF THE OFFICE HAS ANY SAMPLES TO LAST HER UNTIL THEN.PLEASE CALLBACK WITH UPDATES.

## 2024-11-20 NOTE — TELEPHONE ENCOUNTER
Caller: Oneida See    Relationship: Self    Best call back number:   Telephone Information:   Mobile [phone]       What medication are you requesting: Synthroid 112 MCG tablet-90 DAY SUPPLY    If a prescription is needed, what is your preferred pharmacy and phone number: [facility] - [facility] [address] - [phone]  - [phone]      Additional notes:PATIENT STATED THAT THE Synthroid 112 MCG tablet PRESCRIPTION WAS SENT IN FOR A 2 MONTH SUPPLY AND IT IS GOING TO COST HER MORE SO PATIENT IS REQUESTING IF THE MEDICATION CAN BE CALLED IN FOR A  3 MONTH SUPPLY.

## 2024-11-21 DIAGNOSIS — E03.9 HYPOTHYROIDISM, UNSPECIFIED TYPE: ICD-10-CM

## 2024-11-21 RX ORDER — LEVOTHYROXINE SODIUM 112 MCG
112 TABLET ORAL DAILY
Qty: 90 TABLET | Refills: 1 | OUTPATIENT
Start: 2024-11-21

## 2024-11-21 RX ORDER — LEVOTHYROXINE SODIUM 112 MCG
112 TABLET ORAL DAILY
Qty: 28 TABLET | Refills: 0 | COMMUNITY
Start: 2024-11-21

## 2024-11-21 NOTE — TELEPHONE ENCOUNTER
Rx Refill Note  Requested Prescriptions      No prescriptions requested or ordered in this encounter      Last office visit with prescribing clinician: 10/22/2024   Last telemedicine visit with prescribing clinician: Visit date not found   Next office visit with prescribing clinician: 4/22/2025                         Would you like a call back once the refill request has been completed: [] Yes [] No    If the office needs to give you a call back, can they leave a voicemail: [] Yes [] No    Ирина Bajwa MA  11/21/24, 09:11 EST    Pt wants it sent to mail delivery.

## 2024-11-21 NOTE — TELEPHONE ENCOUNTER
Called and spoke with pt, informed her we will send FitLinxxroid to mail order. We have samples, and they will be at the  for her to .

## 2024-11-21 NOTE — TELEPHONE ENCOUNTER
Called and spoke with pt, informed her we sent in a rx to mail delivery pharmacy and she can  samples at the .

## 2024-12-06 DIAGNOSIS — E03.9 HYPOTHYROIDISM, UNSPECIFIED TYPE: ICD-10-CM

## 2024-12-06 NOTE — TELEPHONE ENCOUNTER
Caller: Oneida See    Relationship: Self    Best call back number: [phone]     Requested Prescriptions:   Requested Prescriptions     Pending Prescriptions Disp Refills    Synthroid 112 MCG tablet 60 tablet 0     Sig: Take 1 tablet by mouth Daily.        Pharmacy where request should be sent: [facility]IVERY - [facility] [address] - [phone]  - [phone]     Last office visit with prescribing clinician: 10/22/2024   Last telemedicine visit with prescribing clinician: Visit date not found   Next office visit with prescribing clinician: 4/22/2025     Additional details provided by patient:      Does the patient have less than a 3 day supply:  [x] Yes  [] No    Would you like a call back once the refill request has been completed: [x] Yes [] No    If the office needs to give you a call back, can they leave a voicemail: [x] Yes [] No    Rossana Ramesh Rep   12/06/24 10:22 EST

## 2024-12-08 RX ORDER — LEVOTHYROXINE SODIUM 112 MCG
112 TABLET ORAL DAILY
Qty: 60 TABLET | Refills: 0 | OUTPATIENT
Start: 2024-12-08

## 2024-12-12 ENCOUNTER — TELEPHONE (OUTPATIENT)
Dept: FAMILY MEDICINE CLINIC | Facility: CLINIC | Age: 69
End: 2024-12-12
Payer: MEDICARE

## 2024-12-13 NOTE — TELEPHONE ENCOUNTER
Caller: Oneida See    Relationship: Self    Best call back number: [phone]     What is the best time to reach you: ANY     Who are you requesting to speak with (clinical staff, provider,  specific staff member): CLINICAL STAFF     Do you know the name of the person who called:     What was the call regarding: PATIENT IS REQUESTING APPOINTMENT FOR RIGHT KNEE PAIN    Is it okay if the provider responds through MyChart:     t.”   
HUB TO RELAY/ SCHEDULE    LDTVM ADVISING PT CALLING TO MAKE APPT FOR KNEE PAIN.   PCP HAS NO OPENINGS TODAY. SHE MAY SEE ANOTHER PROVIDER IF SCHEDULING ALLOWS.  IF NO OPENINGS WITH OTHER PROVIDERS PT WILL NEED TO GO TO  FOR EVAL.   
22-Jan-2023 00:08

## 2024-12-17 DIAGNOSIS — E03.9 HYPOTHYROIDISM, UNSPECIFIED TYPE: ICD-10-CM

## 2024-12-17 RX ORDER — LEVOTHYROXINE SODIUM 112 MCG
112 TABLET ORAL DAILY
Qty: 60 TABLET | Refills: 0 | Status: SHIPPED | OUTPATIENT
Start: 2024-12-17

## 2024-12-17 NOTE — TELEPHONE ENCOUNTER
Caller: Oneida See    Relationship: Self    Best call back number: [phone]     Requested Prescriptions:   Requested Prescriptions     Pending Prescriptions Disp Refills    Synthroid 112 MCG tablet 60 tablet 0     Sig: Take 1 tablet by mouth Daily.        Pharmacy where request should be sent: [facility] MAIL DELIVERY - [facility] [address] - [phone]  - [phone] FX     Last office visit with prescribing clinician: 10/22/2024   Last telemedicine visit with prescribing clinician: Visit date not found   Next office visit with prescribing clinician: 4/22/2025     Additional details provided by patient: PATIENT DID NOT  THE PRESCRIPTION FROM [facility] WHEN IT WAS CALLED IN ON 11/10/24 DUE TO PATIENT NEEDS IT SENT TO [facility].  PLEASE SEND NEW PRESCRIPTION TO [facility] TODAY.  PATIENT HAS 4 PILLS LEFT.      Does the patient have less than a 3 day supply:  [] Yes  [x] No    Would you like a call back once the refill request has been completed: [x] Yes [] No    If the office needs to give you a call back, can they leave a voicemail: [x] Yes [] No    Rossana Bedolla Rep   12/17/24 09:32 EST

## 2024-12-20 DIAGNOSIS — E03.9 HYPOTHYROIDISM, UNSPECIFIED TYPE: ICD-10-CM

## 2024-12-20 RX ORDER — LEVOTHYROXINE SODIUM 112 MCG
112 TABLET ORAL DAILY
Qty: 7 TABLET | Refills: 0 | Status: CANCELLED | OUTPATIENT
Start: 2024-12-20

## 2024-12-20 NOTE — TELEPHONE ENCOUNTER
Pt wants 90 days sent to [facility]. Until she gets it from them she would like a week sent to [facility].     Rx Refill Note  Requested Prescriptions      No prescriptions requested or ordered in this encounter      Last office visit with prescribing clinician: 10/22/2024   Last telemedicine visit with prescribing clinician: Visit date not found   Next office visit with prescribing clinician: 4/22/2025                         Would you like a call back once the refill request has been completed: [] Yes [] No    If the office needs to give you a call back, can they leave a voicemail: [] Yes [] No    Ирина Bajwa MA  12/20/24, 13:50 EST

## 2025-01-09 DIAGNOSIS — E03.9 HYPOTHYROIDISM, UNSPECIFIED TYPE: ICD-10-CM

## 2025-01-09 RX ORDER — LEVOTHYROXINE SODIUM 112 MCG
112 TABLET ORAL DAILY
Qty: 30 TABLET | Refills: 0 | Status: SHIPPED | OUTPATIENT
Start: 2025-01-09

## 2025-02-06 ENCOUNTER — HOSPITAL ENCOUNTER (EMERGENCY)
Facility: HOSPITAL | Age: 70
Discharge: HOME | End: 2025-02-06
Attending: EMERGENCY MEDICINE
Payer: MEDICARE

## 2025-02-06 ENCOUNTER — APPOINTMENT (OUTPATIENT)
Dept: CT IMAGING | Facility: HOSPITAL | Age: 70
End: 2025-02-06
Payer: MEDICARE

## 2025-02-06 VITALS
RESPIRATION RATE: 18 BRPM | DIASTOLIC BLOOD PRESSURE: 104 MMHG | SYSTOLIC BLOOD PRESSURE: 150 MMHG | HEART RATE: 71 BPM | OXYGEN SATURATION: 98 % | TEMPERATURE: 98 F

## 2025-02-06 DIAGNOSIS — H53.9 CHANGES IN VISION: Primary | ICD-10-CM

## 2025-02-06 LAB
ALBUMIN SERPL-MCNC: 4 G/DL (ref 3.5–5.2)
ALBUMIN/GLOB SERPL: 1.6 G/DL
ALP SERPL-CCNC: 95 U/L (ref 39–117)
ALT SERPL W P-5'-P-CCNC: 15 U/L (ref 1–33)
ANION GAP SERPL CALCULATED.3IONS-SCNC: 9 MMOL/L (ref 5–15)
AST SERPL-CCNC: 19 U/L (ref 1–32)
BASOPHILS # BLD AUTO: 0.03 10*3/MM3 (ref 0–0.2)
BASOPHILS NFR BLD AUTO: 0.5 % (ref 0–1.5)
BILIRUB SERPL-MCNC: 0.3 MG/DL (ref 0–1.2)
BUN SERPL-MCNC: 15 MG/DL (ref 8–23)
BUN/CREAT SERPL: 21.7 (ref 7–25)
CALCIUM SPEC-SCNC: 9.1 MG/DL (ref 8.6–10.5)
CHLORIDE SERPL-SCNC: 109 MMOL/L (ref 98–107)
CO2 SERPL-SCNC: 22 MMOL/L (ref 22–29)
CREAT SERPL-MCNC: 0.69 MG/DL (ref 0.57–1)
DEPRECATED RDW RBC AUTO: 40.7 FL (ref 37–54)
EGFRCR SERPLBLD CKD-EPI 2021: 93.5 ML/MIN/1.73
EOSINOPHIL # BLD AUTO: 0.13 10*3/MM3 (ref 0–0.4)
EOSINOPHIL NFR BLD AUTO: 2.3 % (ref 0.3–6.2)
ERYTHROCYTE [DISTWIDTH] IN BLOOD BY AUTOMATED COUNT: 12.3 % (ref 12.3–15.4)
ERYTHROCYTE [SEDIMENTATION RATE] IN BLOOD: 11 MM/HR (ref 0–30)
GLOBULIN UR ELPH-MCNC: 2.5 GM/DL
GLUCOSE SERPL-MCNC: 97 MG/DL (ref 65–99)
HCT VFR BLD AUTO: 36.6 % (ref 34–46.6)
HGB BLD-MCNC: 12.8 G/DL (ref 12–15.9)
IMM GRANULOCYTES # BLD AUTO: 0.05 10*3/MM3 (ref 0–0.05)
IMM GRANULOCYTES NFR BLD AUTO: 0.9 % (ref 0–0.5)
LYMPHOCYTES # BLD AUTO: 1.77 10*3/MM3 (ref 0.7–3.1)
LYMPHOCYTES NFR BLD AUTO: 30.9 % (ref 19.6–45.3)
MCH RBC QN AUTO: 31.9 PG (ref 26.6–33)
MCHC RBC AUTO-ENTMCNC: 35 G/DL (ref 31.5–35.7)
MCV RBC AUTO: 91.3 FL (ref 79–97)
MONOCYTES # BLD AUTO: 0.51 10*3/MM3 (ref 0.1–0.9)
MONOCYTES NFR BLD AUTO: 8.9 % (ref 5–12)
NEUTROPHILS NFR BLD AUTO: 3.23 10*3/MM3 (ref 1.7–7)
NEUTROPHILS NFR BLD AUTO: 56.5 % (ref 42.7–76)
NRBC BLD AUTO-RTO: 0 /100 WBC (ref 0–0.2)
PLATELET # BLD AUTO: 271 10*3/MM3 (ref 140–450)
PMV BLD AUTO: 9.8 FL (ref 6–12)
POTASSIUM SERPL-SCNC: 3.9 MMOL/L (ref 3.5–5.2)
PROT SERPL-MCNC: 6.5 G/DL (ref 6–8.5)
RBC # BLD AUTO: 4.01 10*6/MM3 (ref 3.77–5.28)
SODIUM SERPL-SCNC: 140 MMOL/L (ref 136–145)
WBC NRBC COR # BLD AUTO: 5.72 10*3/MM3 (ref 3.4–10.8)

## 2025-02-06 PROCEDURE — 70498 CT ANGIOGRAPHY NECK: CPT

## 2025-02-06 PROCEDURE — 85025 COMPLETE CBC W/AUTO DIFF WBC: CPT | Performed by: EMERGENCY MEDICINE

## 2025-02-06 PROCEDURE — 93005 ELECTROCARDIOGRAM TRACING: CPT | Performed by: EMERGENCY MEDICINE

## 2025-02-06 PROCEDURE — 70496 CT ANGIOGRAPHY HEAD: CPT

## 2025-02-06 PROCEDURE — 85652 RBC SED RATE AUTOMATED: CPT | Performed by: EMERGENCY MEDICINE

## 2025-02-06 PROCEDURE — 93010 ELECTROCARDIOGRAM REPORT: CPT | Performed by: INTERNAL MEDICINE

## 2025-02-06 PROCEDURE — 25510000001 IOPAMIDOL PER 1 ML: Performed by: EMERGENCY MEDICINE

## 2025-02-06 PROCEDURE — 80053 COMPREHEN METABOLIC PANEL: CPT | Performed by: EMERGENCY MEDICINE

## 2025-02-06 PROCEDURE — 99285 EMERGENCY DEPT VISIT HI MDM: CPT

## 2025-02-06 RX ORDER — SODIUM CHLORIDE 0.9 % (FLUSH) 0.9 %
10 SYRINGE (ML) INJECTION AS NEEDED
Status: DISCONTINUED | OUTPATIENT
Start: 2025-02-06 | End: 2025-02-06 | Stop reason: HOSPADM

## 2025-02-06 RX ORDER — PROPARACAINE HYDROCHLORIDE 5 MG/ML
2 SOLUTION/ DROPS OPHTHALMIC ONCE
Status: COMPLETED | OUTPATIENT
Start: 2025-02-06 | End: 2025-02-06

## 2025-02-06 RX ORDER — IOPAMIDOL 755 MG/ML
100 INJECTION, SOLUTION INTRAVASCULAR
Status: COMPLETED | OUTPATIENT
Start: 2025-02-06 | End: 2025-02-06

## 2025-02-06 RX ADMIN — PROPARACAINE HYDROCHLORIDE 2 DROP: 5 SOLUTION/ DROPS OPHTHALMIC at 19:50

## 2025-02-06 RX ADMIN — IOPAMIDOL 95 ML: 755 INJECTION, SOLUTION INTRAVENOUS at 20:02

## 2025-02-06 NOTE — ED PROVIDER NOTES
" EMERGENCY DEPARTMENT ENCOUNTER  Room Number:  26/26  PCP: Halle Walton APRN  Independent Historians: Patient and her son      HPI:  Chief Complaint: Vision changes    A complete HPI/ROS/PMH/PSH/SH/FH are unobtainable due to: None    Chronic or social conditions impacting patient care (Social Determinants of Health): None      Context: Oneida See is a 69 y.o. female with a medical history of sleep apnea and thyroid disease who presents to the ED c/o acute loss of visual acuities over the past 2 weeks.  Patient says that she has never had any eye surgeries or procedures performed.  She usually has 20/20 vision in both eyes.  A couple of weeks ago she had some eye redness and therefore she was prescribed some \"diclofenac \"eyedrops which she used for a couple days before leaving [facility] to fly to [place].  However, her vision began to diminish in that time and has progressively worsened from 2020, then to 20/30 in both eyes and now 2/20/1980 in both eyes.  She has had some mild headaches as well.  She went to her ophthalmologist office today and had further testing performed.  She is no longer using eyedrops.  She was advised to come here for further workup given her abrupt change in vision.  She denies any speech changes.  Denies any weakness or numbness of the extremities.    Review of prior external notes (non-ED) -and- Review of prior external test results outside of this encounter: Independently reviewed the family medicine progress note from December 14, 2024.  She was being seen for right knee pain at that time.  Discussed management of osteoarthritis symptoms.    Prescription drug monitoring program review: ALIYAH reviewed by Farhan Duarte MD       PAST MEDICAL HISTORY  Active Ambulatory Problems     Diagnosis Date Noted    Adult hypothyroidism 02/08/2016    LBP (low back pain) 02/08/2016    Chronic neck pain 02/08/2016    DUSTIN (obstructive sleep apnea) 02/08/2016    Vitamin D deficiency 02/08/2016    " Medicare annual wellness visit, subsequent 2016    HPV in female 2013    Colon polyp 2017    Gastroesophageal reflux disease without esophagitis 2017    Thoracic radiculopathy 2018    Slow transit constipation 2018    Asymptomatic microscopic hematuria 2018    Chest wall pain 2019    Degenerative joint disease (DJD) of hip 2021    DJD (degenerative joint disease) 2021    Multiple acquired skin tags 2021    Seborrheic keratosis 2021    Discolored skin 2021    Medically noncompliant 2021    Parent refuses immunizations 2021    Cough 2022    Acute maxillary sinusitis 2022    Postmenopausal 2022    Mixed hyperlipidemia 2022    Chronic fatigue 2022    Acute cystitis with hematuria 2023     Resolved Ambulatory Problems     Diagnosis Date Noted    Blood pressure elevated without history of HTN 2016    Loss of perception for taste 2016    Asthmatic bronchitis without complication 2016    Numbness and tingling in both hands 2018    Right upper quadrant abdominal pain 2019     Past Medical History:   Diagnosis Date    Arthritis     Contact dermatitis and eczema due to plant     Disease of thyroid gland     Floaters in visual field, bilateral     H/O degenerative disc disease     Hemangioma of liver     Hip pain, chronic, left     History of concussion     Human papilloma virus (HPV) infection 2013    Lower back pain     Memory change     Multiple polyps of sigmoid colon     Neck pain     PONV (postoperative nausea and vomiting)     Sleep apnea          PAST SURGICAL HISTORY  Past Surgical History:   Procedure Laterality Date     SECTION      Dr Mishra    COLONOSCOPY      COLONOSCOPY W/ POLYPECTOMY  2016    : 1 polyp    OOPHORECTOMY Bilateral      Dr Meraz  uterus intact    TOTAL HIP ARTHROPLASTY      Right  Dr Hollins Left    Gurvinder    TOTAL HIP ARTHROPLASTY REVISION Left 2021    Procedure: LEFT HIP ACETABULUM REVISION LEFT POSTERIOR APPROACH;  Surgeon: Juanito Hollins MD;  Location: [facility] OR;  Service: Orthopedics;  Laterality: Left;         FAMILY HISTORY  Family History   Problem Relation Age of Onset    Heart disease Father     Breast cancer Maternal Aunt 70    Malig Hyperthermia Neg Hx          SOCIAL HISTORY  Social History     Socioeconomic History    Marital status:    Tobacco Use    Smoking status: Former     Current packs/day: 0.00     Average packs/day: 0.2 packs/day for 10.0 years (1.5 ttl pk-yrs)     Types: Cigarettes     Start date:      Quit date:      Years since quittin.1     Passive exposure: Past    Smokeless tobacco: Never    Tobacco comments:     social    Vaping Use    Vaping status: Never Used   Substance and Sexual Activity    Alcohol use: Yes     Comment: occasionally    Drug use: No    Sexual activity: Defer         ALLERGIES  Penicillins      REVIEW OF SYSTEMS  Review of Systems  Included in HPI  All systems reviewed and negative except for those discussed in HPI.      PHYSICAL EXAM    I have reviewed the triage vital signs and nursing notes.    ED Triage Vitals [25 1741]   Temp Heart Rate Resp BP SpO2   98 °F (36.7 °C) 80 18 -- 98 %      Temp src Heart Rate Source Patient Position BP Location FiO2 (%)   -- -- -- -- --       Physical Exam  GENERAL: alert, no acute distress  SKIN: Warm, dry, no rashes  HENT: Normocephalic, atraumatic, moist mucous membrane  EYES: no scleral icterus, normal conjunctivae, pupils equally round and reactive.  Extraocular muscles are intact.  Nondilated funduscopic exam appears normal bilaterally.  No conjunctival injection no discharge.  Visual acuities: Right eye 20/100.  Left eye 20/50  CV: regular rhythm, regular rate, normal pulses  RESPIRATORY: normal effort, lungs clear bilaterally  ABDOMEN: soft, nondistended, nontender  MUSCULOSKELETAL:  no deformity, no asymmetry or edema of the extremity  NEURO: alert, moves all extremities, follows commands, speech is normal.  No facial droop      LAB RESULTS  Recent Results (from the past 24 hours)   ECG 12 Lead Other; vision changes    Collection Time: 02/06/25  6:32 PM   Result Value Ref Range    QT Interval 362 ms    QTC Interval 400 ms   Comprehensive Metabolic Panel    Collection Time: 02/06/25  6:51 PM    Specimen: Blood   Result Value Ref Range    Glucose 97 65 - 99 mg/dL    BUN 15 8 - 23 mg/dL    Creatinine 0.69 0.57 - 1.00 mg/dL    Sodium 140 136 - 145 mmol/L    Potassium 3.9 3.5 - 5.2 mmol/L    Chloride 109 (H) 98 - 107 mmol/L    CO2 22.0 22.0 - 29.0 mmol/L    Calcium 9.1 8.6 - 10.5 mg/dL    Total Protein 6.5 6.0 - 8.5 g/dL    Albumin 4.0 3.5 - 5.2 g/dL    ALT (SGPT) 15 1 - 33 U/L    AST (SGOT) 19 1 - 32 U/L    Alkaline Phosphatase 95 39 - 117 U/L    Total Bilirubin 0.3 0.0 - 1.2 mg/dL    Globulin 2.5 gm/dL    A/G Ratio 1.6 g/dL    BUN/Creatinine Ratio 21.7 7.0 - 25.0    Anion Gap 9.0 5.0 - 15.0 mmol/L    eGFR 93.5 >60.0 mL/min/1.73   Sedimentation Rate    Collection Time: 02/06/25  6:51 PM    Specimen: Blood   Result Value Ref Range    Sed Rate 11 0 - 30 mm/hr   CBC Auto Differential    Collection Time: 02/06/25  6:51 PM    Specimen: Blood   Result Value Ref Range    WBC 5.72 3.40 - 10.80 10*3/mm3    RBC 4.01 3.77 - 5.28 10*6/mm3    Hemoglobin 12.8 12.0 - 15.9 g/dL    Hematocrit 36.6 34.0 - 46.6 %    MCV 91.3 79.0 - 97.0 fL    MCH 31.9 26.6 - 33.0 pg    MCHC 35.0 31.5 - 35.7 g/dL    RDW 12.3 12.3 - 15.4 %    RDW-SD 40.7 37.0 - 54.0 fl    MPV 9.8 6.0 - 12.0 fL    Platelets 271 140 - 450 10*3/mm3    Neutrophil % 56.5 42.7 - 76.0 %    Lymphocyte % 30.9 19.6 - 45.3 %    Monocyte % 8.9 5.0 - 12.0 %    Eosinophil % 2.3 0.3 - 6.2 %    Basophil % 0.5 0.0 - 1.5 %    Immature Grans % 0.9 (H) 0.0 - 0.5 %    Neutrophils, Absolute 3.23 1.70 - 7.00 10*3/mm3    Lymphocytes, Absolute 1.77 0.70 - 3.10 10*3/mm3     Monocytes, Absolute 0.51 0.10 - 0.90 10*3/mm3    Eosinophils, Absolute 0.13 0.00 - 0.40 10*3/mm3    Basophils, Absolute 0.03 0.00 - 0.20 10*3/mm3    Immature Grans, Absolute 0.05 0.00 - 0.05 10*3/mm3    nRBC 0.0 0.0 - 0.2 /100 WBC         RADIOLOGY  CT Angiogram Head, CT Angiogram Neck    Result Date: 2/6/2025  CT ANGIOGRAM HEAD-, CT ANGIOGRAM NECK-  INDICATIONS: Vision changes  TECHNIQUE: Radiation dose reduction techniques were utilized, including automated exposure control and exposure modulation based on body size.Noncontrast head CT was performed, followed by enhanced CT angiography of the head and neck. Three-dimensional reconstructions were created, reviewed, and assessed according to NASCET criteria.  COMPARISON: None available  FINDINGS:  No acute intracranial hemorrhage, midline shift or mass effect. No acute territorial infarct is identified.  No enhancing lesions of brain are seen.  Ventricles, cisterns, cerebral sulci are unremarkable for patient age.  The visualized paranasal sinuses, orbits, mastoid air cells are unremarkable. Numerous scattered subcutaneous scalp calcifications are noted.  The CT angiography images show no hemodynamically significant focal stenosis, aneurysm, or dissection in the cervical carotid or vertebral arteries, or in the arteries at the base of the brain.  Facet and uncovertebral joint hypertrophy contribute to neuroforaminal narrowing, more prominent bilaterally at C4/5, C5/6, C6/7. Congenital narrowing of the spinal canal is present. Assessment of intracanalicular contents is limited without intrathecal contrast material, but there appears to be severe central stenosis at C5/6, C6/7 (MRI or CT myelogram could be obtained for further evaluation as indicated).  Minimal atelectasis is apparent in the partly included upper lungs.       1. No hemodynamically significant focal stenosis, aneurysm, or dissection in the cervical carotid or vertebral arteries or in the arteries at  the base of the brain.  2. No acute intracranial hemorrhage or hydrocephalus. No enhancing lesion in brain. If there is further clinical concern, MRI could be considered for further evaluation.  This report was finalized on 2/6/2025 8:59 PM by Dr. Vincenzo Dsouza M.D on Workstation: [id number]         MEDICATIONS GIVEN IN ER  Medications   sodium chloride 0.9 % flush 10 mL (has no administration in time range)   proparacaine (ALCAINE) 0.5 % ophthalmic solution 2 drop (2 drops Both Eyes Given by Other 2/6/25 1950)   iopamidol (ISOVUE-370) 76 % injection 100 mL (95 mL Intravenous Given 2/6/25 2002)         ORDERS PLACED DURING THIS VISIT:  Orders Placed This Encounter   Procedures    CT Angiogram Head    CT Angiogram Neck    Comprehensive Metabolic Panel    Sedimentation Rate    CBC Auto Differential    Monitor Blood Pressure    Monitor Blood Pressure    Visual acuity screening    Ophthalmology (on-call MD unless specified)    ECG 12 Lead Other; vision changes    Insert Peripheral IV    CBC & Differential         OUTPATIENT MEDICATION MANAGEMENT:  Current Facility-Administered Medications Ordered in Epic   Medication Dose Route Frequency Provider Last Rate Last Admin    sodium chloride 0.9 % flush 10 mL  10 mL Intravenous PRN Farhan Duarte MD         Current Outpatient Medications Ordered in Epic   Medication Sig Dispense Refill    Ascorbic Acid (VITAMIN C PO) Take 1 tablet by mouth Daily.      Cholecalciferol (Vitamin D3) 1.25 MG (50949 UT) capsule Take one capsule by mouth once every week. 13 capsule 1    Multiple Vitamins-Minerals (ZINC PO) Take 1 tablet by mouth Daily. HOLD FOR ONE WEEK PRIOR TO SURGERY      Synthroid 112 MCG tablet TAKE 1 TABLET EVERY DAY 30 tablet 0    Vitamin Mixture (VITAMIN E COMPLETE PO) Take 1 tablet by mouth Daily. Pt stated to hold 1 week prior to surgery           PROCEDURES  Procedures          PROGRESS, DATA ANALYSIS, CONSULTS, AND MEDICAL DECISION MAKING  All labs have been  independently interpreted by me.  All radiology studies have been reviewed by me. All EKG's have been independently viewed and interpreted by me.  Discussion below represents my analysis of pertinent findings related to patient's condition, differential diagnosis, treatment plan and final disposition.    Differential diagnosis includes but is not limited to amaurosis fugax, macular degeneration, stroke, ocular migraine, central retinal arterial occlusion, central retinal venous occlusion, retinal detachment, conjunctivitis.    Clinical Scores:                   ED Course as of 02/06/25 2235   Thu Feb 06, 2025 1844 Proceeding with a more broad workup regarding her vision loss and mild/vague headache symptoms.  Ordering a CT angiogram of the head and neck.  I will check her intraocular pressures and we will record her visual acuities here as well. [GISELA]   1954 I evaluated the intraocular pressures with the iCare  instrument.  The right eye pressure is 16.7.  The left eye intraocular pressure is 13.4.  Both pressures are normal and satisfactory. [GISELA]   2207 EKG         EKG time/Interp time: 1832/1846  Rhythm/Rate: Sinus rhythm, 73 bpm  P waves and [place]: Present, normal interval, 166 ms  QRS, axis: 95 ms, narrow complex, normal axis  ST and T waves: No ST segment elevations are present.  No ischemic changes.  Independently interpreted by me contemporaneously with treatment   [GISELA]   2232 I independently interpreted the Head CT w/o Contrast and my findings are: No acute hemorrhage, no midline shift   [GISELA]   2232 I discussed with Dr. Ordaz from ophthalmology about this patient.  I reviewed with him the patient's clinical presentation here and all the test results we have obtained so far.  He suggested patient's symptoms might possibly be due to uveitis or cataracts.  He is agreeable to have the patient follow-up in their office tomorrow for an evaluation with Dr. Jacob.  He gave me the office phone number so that  "she could call and make an appointment. [GISELA]   6471 I have reviewed all the findings with the patient and her son at the bedside.  Right now there does not appear to be any evidence of an acute vascular or neurologic emergency condition.  I feel that she has something intrinsic to the eye which is causing her visual changes over the past 2 weeks.  At this time I think it is appropriate for her to discharge home and follow-up in the ophthalmology clinic tomorrow for a second opinion and more complete ophthalmologic examination.  She is agreeable with that plan as outlined.  Will review with her usual \"return to ER \"instructions prior to discharge. [GISELA]      ED Course User Index  [GISELA] Farhan Duarte MD             AS OF 22:35 EST VITALS:    BP - (!) 150/104  HR - 71  TEMP - 98 °F (36.7 °C)  O2 SATS - 98%    COMPLEXITY OF CARE  Admission was considered but after careful review of the patient's presentation, physical examination, diagnostic results, and response to treatment the patient may be safely discharged with outpatient follow-up.      DIAGNOSIS  Final diagnoses:   Changes in vision         DISPOSITION  ED Disposition       ED Disposition   Discharge    Condition   Stable    Comment   --                Please note that portions of this document were completed with a voice recognition program.    Note Disclaimer: At [facility], we believe that sharing information builds trust and better relationships. You are receiving this note because you recently visited [facility]. It is possible you will see health information before a provider has talked with you about it. This kind of information can be easy to misunderstand. To help you fully understand what it means for your health, we urge you to discuss this note with your provider.         Farhan Duarte MD  02/06/25 2235    "

## 2025-02-06 NOTE — ED NOTES
Patient to ER via car from home for vision that went from 20/20 t 20/80 over the last 2 weeks  Patient reports this started after eye redness and eye drops and flight to [place]

## 2025-02-07 ENCOUNTER — TELEPHONE (OUTPATIENT)
Dept: FAMILY MEDICINE CLINIC | Facility: CLINIC | Age: 70
End: 2025-02-07
Payer: MEDICARE

## 2025-02-07 LAB
QT INTERVAL: 362 MS
QTC INTERVAL: 400 MS

## 2025-02-07 NOTE — DISCHARGE INSTRUCTIONS
Call the ophthalmology office tomorrow morning as we discussed to schedule a prompt follow-up appoint with Dr. Jacob.  Please return to the emergency room for any worsening pain, headache, dizziness, vision changes or any other concerns

## 2025-02-07 NOTE — TELEPHONE ENCOUNTER
Caller: Oneida See    Relationship: Self    Best call back number: [phone]     What form or medical record are you requesting: BLOOD WORK RESULTS FROM 11-    How would you like to receive the form or medical records (pick-up, mail, fax): MAIL  If mail, what is the address: [address] [place] [id number]     Additional notes: PATIENT IS REQUESTING LAB RESULTS BE MAILED TO HER.

## 2025-02-14 DIAGNOSIS — E03.9 HYPOTHYROIDISM, UNSPECIFIED TYPE: ICD-10-CM

## 2025-02-14 RX ORDER — LEVOTHYROXINE SODIUM 112 MCG
112 TABLET ORAL DAILY
Qty: 90 TABLET | Refills: 1 | Status: SHIPPED | OUTPATIENT
Start: 2025-02-14

## 2025-02-18 ENCOUNTER — OFFICE VISIT (OUTPATIENT)
Dept: FAMILY MEDICINE CLINIC | Facility: CLINIC | Age: 70
End: 2025-02-18
Payer: MEDICARE

## 2025-02-18 VITALS
SYSTOLIC BLOOD PRESSURE: 110 MMHG | HEIGHT: 67 IN | BODY MASS INDEX: 32.68 KG/M2 | DIASTOLIC BLOOD PRESSURE: 76 MMHG | HEART RATE: 77 BPM | TEMPERATURE: 98.2 F | OXYGEN SATURATION: 99 % | WEIGHT: 208.2 LBS

## 2025-02-18 DIAGNOSIS — R79.9 ABNORMAL FINDING OF BLOOD CHEMISTRY, UNSPECIFIED: ICD-10-CM

## 2025-02-18 DIAGNOSIS — H53.133 SUDDEN VISUAL LOSS OF BOTH EYES: Primary | ICD-10-CM

## 2025-02-18 DIAGNOSIS — E03.9 ADULT HYPOTHYROIDISM: ICD-10-CM

## 2025-02-18 DIAGNOSIS — M17.12 OSTEOARTHRITIS OF LEFT PATELLOFEMORAL JOINT: ICD-10-CM

## 2025-02-18 DIAGNOSIS — H53.9 VISUAL DISTURBANCES: ICD-10-CM

## 2025-02-18 PROCEDURE — 1160F RVW MEDS BY RX/DR IN RCRD: CPT

## 2025-02-18 PROCEDURE — G2211 COMPLEX E/M VISIT ADD ON: HCPCS

## 2025-02-18 PROCEDURE — 1126F AMNT PAIN NOTED NONE PRSNT: CPT

## 2025-02-18 PROCEDURE — 99214 OFFICE O/P EST MOD 30 MIN: CPT

## 2025-02-18 PROCEDURE — 1111F DSCHRG MED/CURRENT MED MERGE: CPT

## 2025-02-18 PROCEDURE — 1159F MED LIST DOCD IN RCRD: CPT

## 2025-02-18 NOTE — PROGRESS NOTES
Transitional Care Follow Up Visit  Subjective     Oneida is a 70 y.o. female who presents for a transitional care management visit.    Within 48 business hours after discharge our office contacted her via telephone to coordinate her care and needs.      I reviewed and discussed the details of that call along with the discharge summary, hospital problems, inpatient lab results, inpatient diagnostic studies, and consultation reports with Oneida.     Current outpatient and discharge medications have been reconciled for the patient.  Reviewed by: KANCHAN Marvin         No data to display              Risk for Readmission (LACE) No data recorded    History of Present Illness   History of Present Illness  Oneida See 70 y.o. female presents for a follow-up on her recent ER visit.    Ms. See experienced an episode of vision loss, which was preceded by eye irritation on 01/15/2025. She consulted an Ophthalmologist who prescribed Diclofenac eye drops, which she used for 3 days. Upon returning from a trip 5 days later, she noticed significant blurring of her vision. Initially, her visual acuity was 20/20, but subsequent examinations revealed a decline to 20/30 and then 20/80. A comprehensive eye examination by Dr. Aguyao did not reveal any structural abnormalities or necrosis, but severe dry eyes were noted. The potential causes identified included recent air travel, dry eye syndrome, and a change in thyroid medication. She has been using preservative-free eye drops 4 times daily and maintaining hydration. Her Ophthalmology follow-up appointment is scheduled for 03/18/2025. She reports no dizziness, syncope, headaches, speech changes, nausea, vomiting, or unilateral numbness or weakness. Her vision changes are bilateral, with one eye being more affected than the other. She describes her vision as blurry rather than dark, with no central vision loss or peripheral vision constriction. She reports persistent floaters  "since a concussion in 2017, but no recent increase in these symptoms.    She has been taking thyroid medication since her teenage years, with biannual monitoring of her TSH levels. She has been taking Synthroid 112 mcg. She rarely misses a dose and has never taken a double dose of her thyroid medication.    She had a knee x-ray at [facility] on 12/14/2024 and was told that she did not need a new hip or knee, just that it was inflamed. She stepped off a ladder weird, which worsened the knee pain. The first time was at the gym when she did the leg press machine and then the ladder. She thought it was better, but it has not improved. She is going work on stretches on Thursday and wants to make sure she knows what she can do and can not do.    MEDICATIONS  Current: Synthroid, vitamins    Course During Hospital Stay The following information was reviewed by: KANCHAN Marvin on 02/18/2025:   ED with Farhan Duarte MD (02/06/2025)     HPI:  Chief Complaint: Vision changes     A complete HPI/ROS/PMH/PSH/SH/FH are unobtainable due to: None     Chronic or social conditions impacting patient care (Social Determinants of Health): None     Context: Oneida See is a 69 y.o. female with a medical history of sleep apnea and thyroid disease who presents to the ED c/o acute loss of visual acuities over the past 2 weeks.  Patient says that she has never had any eye surgeries or procedures performed.  She usually has 20/20 vision in both eyes.  A couple of weeks ago she had some eye redness and therefore she was prescribed some \"diclofenac \"eyedrops which she used for a couple days before leaving [facility] to fly to [place].  However, her vision began to diminish in that time and has progressively worsened from 2020, then to 20/30 in both eyes and now 2/20/1980 in both eyes.  She has had some mild headaches as well.  She went to her ophthalmologist office today and had further testing performed.  She is no " longer using eyedrops.  She was advised to come here for further workup given her abrupt change in vision.  She denies any speech changes.  Denies any weakness or numbness of the extremities.     Review of prior external notes (non-ED) -and- Review of prior external test results outside of this encounter: Independently reviewed the family medicine progress note from December 14, 2024.  She was being seen for right knee pain at that time.  Discussed management of osteoarthritis symptoms.     Prescription drug monitoring program review: ALIYAH reviewed by Farhan Duarte MD       ED Course as of 02/06/25 2235   Thu Feb 06, 2025   1844 Proceeding with a more broad workup regarding her vision loss and mild/vague headache symptoms.  Ordering a CT angiogram of the head and neck.  I will check her intraocular pressures and we will record her visual acuities here as well. [GISELA]   1954 I evaluated the intraocular pressures with the iCare  instrument.  The right eye pressure is 16.7.  The left eye intraocular pressure is 13.4.  Both pressures are normal and satisfactory. [GISELA]   2207 EKG                          EKG time/Interp time: 1832/1846  Rhythm/Rate: Sinus rhythm, 73 bpm  P waves and CT: Present, normal interval, 166 ms  QRS, axis: 95 ms, narrow complex, normal axis  ST and T waves: No ST segment elevations are present.  No ischemic changes.  Independently interpreted by me contemporaneously with treatment   [GISELA]   2232 I independently interpreted the Head CT w/o Contrast and my findings are: No acute hemorrhage, no midline shift   [GISELA]   2232 I discussed with Dr. Ordaz from ophthalmology about this patient.  I reviewed with him the patient's clinical presentation here and all the test results we have obtained so far.  He suggested patient's symptoms might possibly be due to uveitis or cataracts.  He is agreeable to have the patient follow-up in their office tomorrow for an evaluation with Dr. Jacob.  He gave me  "the office phone number so that she could call and make an appointment. [GISELA]   7923 I have reviewed all the findings with the patient and her son at the bedside.  Right now there does not appear to be any evidence of an acute vascular or neurologic emergency condition.  I feel that she has something intrinsic to the eye which is causing her visual changes over the past 2 weeks.  At this time I think it is appropriate for her to discharge home and follow-up in the ophthalmology clinic tomorrow for a second opinion and more complete ophthalmologic examination.  She is agreeable with that plan as outlined.  Will review with her usual \"return to ER \"instructions prior to discharge. [GISELA]       ED Course User Index  [GISELA] Farhan Duarte MD      AS OF 22:35 EST VITALS:     BP - (!) 150/104  HR - 71  TEMP - 98 °F (36.7 °C)  O2 SATS - 98%     COMPLEXITY OF CARE  Admission was considered but after careful review of the patient's presentation, physical examination, diagnostic results, and response to treatment the patient may be safely discharged with outpatient follow-up.      DIAGNOSIS  Final diagnoses:   Changes in vision      DISPOSITION  ED Disposition         ED Disposition   Discharge    Condition   Stable    Comment   --        Follow-Ups: Call Patric Ordaz MD (Ophthalmology) in 1 day (2/7/2025); Call tomorrow morning at 9:00 AM to schedule an appointment as we discussed.     Discharge Instructions    Call the ophthalmology office tomorrow morning as we discussed to schedule a prompt follow-up appoint with Dr. Jacob.  Please return to the emergency room for any worsening pain, headache, dizziness, vision changes or any other concerns      The following portions of the patient's history were reviewed and updated as appropriate: allergies, current medications, past family history, past medical history, past social history, past surgical history, and problem list.     Vitals:    02/18/25 1004   BP: 110/76   BP " "Location: Left arm   Patient Position: Sitting   Cuff Size: Adult   Pulse: 77   Temp: 98.2 °F (36.8 °C)   TempSrc: Oral   SpO2: 99%   Weight: 94.4 kg (208 lb 3.2 oz)   Height: 168.9 cm (66.5\")   PainSc: 0-No pain     Advance Care Planning   ACP discussion was declined by the patient. Patient does not have an advance directive, declines further assistance.     Review of Systems   Constitutional:  Negative for chills, fatigue and fever.   HENT:  Negative for trouble swallowing.    Eyes:  Positive for visual disturbance. Negative for photophobia, pain, discharge, redness and itching.   Respiratory:  Negative for cough, chest tightness and shortness of breath.    Cardiovascular:  Negative for chest pain and palpitations.   Gastrointestinal:  Negative for abdominal pain, nausea and vomiting.   Genitourinary:  Negative for decreased urine volume.   Musculoskeletal:  Negative for back pain.   Skin:  Negative for rash.   Neurological:  Negative for dizziness, tremors, seizures, syncope, facial asymmetry, speech difficulty, weakness, light-headedness and numbness.   Psychiatric/Behavioral:  Negative for behavioral problems and sleep disturbance.         Objective   Physical Exam  Vitals and nursing note reviewed.   Constitutional:       General: She is not in acute distress.     Appearance: She is well-developed.   HENT:      Head: Normocephalic and atraumatic. No right periorbital erythema or left periorbital erythema.      Nose: Nose normal.   Eyes:      General: No scleral icterus.        Right eye: No discharge.         Left eye: No discharge.      Extraocular Movements: Extraocular movements intact.      Conjunctiva/sclera: Conjunctivae normal.      Pupils: Pupils are equal, round, and reactive to light.   Neck:      Vascular: No carotid bruit.   Cardiovascular:      Rate and Rhythm: Normal rate and regular rhythm.      Heart sounds: Normal heart sounds.   Pulmonary:      Effort: Pulmonary effort is normal. "   Musculoskeletal:         General: No swelling.      Cervical back: Normal range of motion and neck supple.   Skin:     General: Skin is warm and dry.      Findings: No erythema or rash.   Neurological:      Mental Status: She is alert and oriented to person, place, and time.      Cranial Nerves: No cranial nerve deficit.      Sensory: Sensation is intact.      Motor: No tremor, atrophy or abnormal muscle tone.      Coordination: Coordination normal.      Gait: Gait normal.   Psychiatric:         Behavior: Behavior normal.         Thought Content: Thought content normal.         Judgment: Judgment normal.       Physical Exam  Lungs were auscultated.  Heart was examined.    DATA REVIEWED:    The following data was reviewed by: KANCHAN Marvin on 02/18/2025:  CT Angiogram Neck (02/06/2025 20:00)  CT Angiogram Head (02/06/2025 20:00)  CBC & Differential (02/06/2025 18:51)  T4, free (02/13/2025 08:30)  TSH (02/13/2025 08:30)  Sedimentation Rate (02/06/2025 18:51)  Comprehensive Metabolic Panel (02/06/2025 18:51)  Lipid panel (11/08/2024 08:49)   XR Knee 3 Vws RT (12/14/2024 13:54)     Results  Laboratory Studies  TSH was low in November 2024, but returned to normal range after dose adjustment. Free T4 increased steadily from 1.06 in 2016 to 1.51 in 2024, but decreased to 1.25 after dose adjustment. Sedimentation rate and white blood cell count were normal.    Imaging  CT angiogram of the eyes showed no significant findings. X-ray of the right knee showed degenerative changes with no evidence of an acute fracture or dislocation.    CT Angiogram Head    Result Date: 2/6/2025  Impression: 1. No hemodynamically significant focal stenosis, aneurysm, or dissection in the cervical carotid or vertebral arteries or in the arteries at the base of the brain.  2. No acute intracranial hemorrhage or hydrocephalus. No enhancing lesion in brain. If there is further clinical concern, MRI could be considered for further evaluation.   This report was finalized on 2/6/2025 8:59 PM by Dr. Vincenzo Dsouza M.D on Workstation: [employer]      CT Angiogram Neck    Result Date: 2/6/2025  Impression: 1. No hemodynamically significant focal stenosis, aneurysm, or dissection in the cervical carotid or vertebral arteries or in the arteries at the base of the brain.  2. No acute intracranial hemorrhage or hydrocephalus. No enhancing lesion in brain. If there is further clinical concern, MRI could be considered for further evaluation.  This report was finalized on 2/6/2025 8:59 PM by Dr. Vincenzo Dsouza M.D on Workstation: [employer]       Assessment & Plan     Diagnoses and all orders for this visit:    1. Sudden visual loss of both eyes (Primary)  -     Thyroid Antibodies  -     Vitamin B12  -     Magnesium  -     Celiac Disease Panel  -     Ferritin  -     Ambulatory Referral to Neurology    2. Visual disturbances  -     Thyroid Antibodies  -     Vitamin B12  -     Magnesium  -     Celiac Disease Panel  -     Ferritin  -     Ambulatory Referral to Neurology    3. Abnormal finding of blood chemistry, unspecified  -     Ferritin    4. Osteoarthritis of left patellofemoral joint  -     Ambulatory Referral to Physical Therapy for Evaluation & Treatment    5. Adult hypothyroidism  -     TSH; Future  -     T4, Free; Future  -     T3, Free; Future      Assessment & Plan  1. Visual disturbances.  Her visual acuity has declined from 20/20 to 20/80 over a span of 3 weeks. Despite comprehensive Ophthalmological evaluations, no structural damage or necrosis was identified. The potential causes identified included recent air travel, dry eye syndrome, and abnormal thyroid function. She has been using preservative-free eye drops 4 times daily and maintaining hydration. Her Ophthalmology follow-up appointment is scheduled for 03/18/2025. She reports no dizziness, syncope, headaches, speech changes, nausea, vomiting, or unilateral numbness or weakness. Her vision changes  are bilateral, with one eye being more affected than the other. She describes her vision as blurry rather than dark, with no central vision loss or peripheral vision constriction. She reports persistent floaters since a concussion in 2017, but no recent increase in these symptoms. A referral to a Neurologist will be made for further evaluation of her sudden vision changes. A visual field screening will also be conducted today.    2. Hypothyroidism.  Her TSH levels have fluctuated significantly over the past year, indicating a potential issue with her thyroid medication dosage. After adjusting her Synthroid dose to 112 mcg, her TSH and free T4 levels have returned to the normal range. She is advised to continue her current Synthroid dosage. A comprehensive panel of tests, including thyroid antibodies, B12, ferritin, magnesium, and a celiac panel, will be ordered to further investigate her symptoms. She is advised to avoid taking any medications within a 2-hour window of her thyroid medication. If her thyroid antibodies are elevated, indicating Hashimoto's disease, a referral to an Endocrinologist will be considered. Thyroid labs will be rechecked in 8 weeks.    3. Degenerative changes in the right knee.  An x-ray of her right knee conducted on 12/14/2024 revealed degenerative changes consistent with arthritis, but no evidence of an acute fracture or dislocation. A referral to [facility] for physical therapy will be made to manage her symptoms and improve her knee function.    Follow Up     Return if symptoms worsen or fail to improve.    Patient or patient representative verbalized consent for the use of Ambient Listening during the visit with  KANCHAN Marvin for chart documentation. 2/18/2025  10:38 EST           -Check Thyroid antibodies, B12, ferritin, magnesium, celiac antibodies.  -Referral to Neurology.  -Referral to [facility] at [facility] for R knee pain and osteoarthritis.   -Recheck thyroid labs in 8  weeks.

## 2025-02-19 ENCOUNTER — TELEPHONE (OUTPATIENT)
Dept: FAMILY MEDICINE CLINIC | Facility: CLINIC | Age: 70
End: 2025-02-19
Payer: MEDICARE

## 2025-02-19 DIAGNOSIS — E03.9 ADULT HYPOTHYROIDISM: Primary | ICD-10-CM

## 2025-02-19 LAB
ENDOMYSIUM IGA SER QL: NEGATIVE
FERRITIN SERPL-MCNC: 64.3 NG/ML (ref 13–150)
IGA SERPL-MCNC: 413 MG/DL (ref 64–422)
MAGNESIUM SERPL-MCNC: 2.2 MG/DL (ref 1.6–2.4)
THYROGLOB AB SERPL-ACNC: 258 IU/ML (ref 0–0.9)
THYROPEROXIDASE AB SERPL-ACNC: 124 IU/ML (ref 0–34)
TTG IGA SER-ACNC: <2 U/ML (ref 0–3)
VIT B12 SERPL-MCNC: 287 PG/ML (ref 211–946)

## 2025-02-19 NOTE — TELEPHONE ENCOUNTER
Received call from patient stating that she got in to see an endocrinologist this afternoon.  She got in to see Dr. Nati Andrea at [address] [facility] [address] [address].  Phone number is [phone].  Patient is wanting Halle to fax over a referral if possible.  Patient does not have the fax number unfortunately.

## 2025-02-24 ENCOUNTER — TELEPHONE (OUTPATIENT)
Dept: FAMILY MEDICINE CLINIC | Facility: CLINIC | Age: 70
End: 2025-02-24
Payer: MEDICARE

## 2025-02-24 NOTE — TELEPHONE ENCOUNTER
Caller: Oneida See    Relationship: Self    Best call back number: [phone]     What is the medical concern/diagnosis:      What specialty or service is being requested:      What is the provider, practice or medical service name:      What is the office location:       What is the office phone number:      Any additional details: PATIENT CALLED ABOUT THE REFERRAL FOR [facility] REHAB AND PLEASE CALL HER BACK WITH UPDATE.

## 2025-02-24 NOTE — TELEPHONE ENCOUNTER
Called pt, let her know referrals can take a week or more to be scheduled. Pt voiced understanding.

## 2025-03-03 ENCOUNTER — TELEPHONE (OUTPATIENT)
Dept: FAMILY MEDICINE CLINIC | Facility: CLINIC | Age: 70
End: 2025-03-03
Payer: MEDICARE

## 2025-03-03 ENCOUNTER — TELEPHONE (OUTPATIENT)
Dept: FAMILY MEDICINE CLINIC | Facility: CLINIC | Age: 70
End: 2025-03-03

## 2025-03-03 DIAGNOSIS — M48.02 NEUROFORAMINAL STENOSIS OF CERVICAL SPINE: Primary | ICD-10-CM

## 2025-03-03 DIAGNOSIS — G89.29 CHRONIC NECK PAIN: ICD-10-CM

## 2025-03-03 DIAGNOSIS — M54.2 CHRONIC NECK PAIN: ICD-10-CM

## 2025-03-03 DIAGNOSIS — M48.00 CENTRAL STENOSIS OF SPINAL CANAL: ICD-10-CM

## 2025-03-03 NOTE — TELEPHONE ENCOUNTER
Caller: Oneida See    Relationship: Self    Best call back number: [phone]      Who are you requesting to speak with (clinical staff, provider,  specific staff member): CLINICAL      What was the call regarding: CHECKING STATUS OF REFERRAL TO [facility]   PLEASE ADVISE

## 2025-03-03 NOTE — TELEPHONE ENCOUNTER
Caller: Oneida See    Relationship: Self    Best call back number: [phone]    What is the medical concern/diagnosis: REFERRAL TO BE SEEN AT [facility] FOR NECK INJURY. CALL PATIENT WITH ANY QUESTIONS     What is the provider, practice or medical service name: [facility]     What is the office location: FAX: [phone] ATTN: [email or web address]     What is the office phone number: [phone]

## 2025-04-22 ENCOUNTER — OFFICE VISIT (OUTPATIENT)
Dept: FAMILY MEDICINE CLINIC | Facility: CLINIC | Age: 70
End: 2025-04-22
Payer: MEDICARE

## 2025-04-22 ENCOUNTER — RESULTS FOLLOW-UP (OUTPATIENT)
Dept: FAMILY MEDICINE CLINIC | Facility: CLINIC | Age: 70
End: 2025-04-22

## 2025-04-22 VITALS
DIASTOLIC BLOOD PRESSURE: 70 MMHG | OXYGEN SATURATION: 99 % | BODY MASS INDEX: 31.83 KG/M2 | HEIGHT: 67 IN | HEART RATE: 67 BPM | WEIGHT: 202.8 LBS | TEMPERATURE: 97.6 F | SYSTOLIC BLOOD PRESSURE: 108 MMHG

## 2025-04-22 DIAGNOSIS — R30.0 DYSURIA: ICD-10-CM

## 2025-04-22 DIAGNOSIS — N39.0 URINARY TRACT INFECTION WITH HEMATURIA, SITE UNSPECIFIED: ICD-10-CM

## 2025-04-22 DIAGNOSIS — E03.9 ADULT HYPOTHYROIDISM: Primary | ICD-10-CM

## 2025-04-22 DIAGNOSIS — R35.0 FREQUENT URINATION: ICD-10-CM

## 2025-04-22 DIAGNOSIS — R31.9 URINARY TRACT INFECTION WITH HEMATURIA, SITE UNSPECIFIED: ICD-10-CM

## 2025-04-22 DIAGNOSIS — R30.0 BURNING WITH URINATION: ICD-10-CM

## 2025-04-22 LAB
BILIRUB BLD-MCNC: NEGATIVE MG/DL
CLARITY, POC: ABNORMAL
COLOR UR: YELLOW
EXPIRATION DATE: ABNORMAL
GLUCOSE UR STRIP-MCNC: NEGATIVE MG/DL
KETONES UR QL: NEGATIVE
LEUKOCYTE EST, POC: ABNORMAL
Lab: ABNORMAL
NITRITE UR-MCNC: NEGATIVE MG/ML
PH UR: 5.5 [PH] (ref 5–8)
PROT UR STRIP-MCNC: NEGATIVE MG/DL
RBC # UR STRIP: ABNORMAL /UL
SP GR UR: 1.02 (ref 1–1.03)
UROBILINOGEN UR QL: ABNORMAL

## 2025-04-22 RX ORDER — NITROFURANTOIN 25; 75 MG/1; MG/1
100 CAPSULE ORAL 2 TIMES DAILY
Qty: 10 CAPSULE | Refills: 0 | Status: SHIPPED | OUTPATIENT
Start: 2025-04-22 | End: 2025-04-27

## 2025-04-22 NOTE — PROGRESS NOTES
"Chief Complaint  Hypothyroidism, Urinary Frequency (X's 1 week), and burning with urination    Subjective          History of Present Illness       Oneida See 70 y.o. female presents for medical management. Since the last visit, she has overall felt well.  She has Hypothyroidism and remains under the care of their specialist. She has been compliant with current medications, and I have reviewed them. The patient denies medication side effects. No medication refills needed today.     Oneida See 70 y.o.female reports dysuria. She has had symptoms for 1 week. The symptoms are mild. Patient denies fever, flank pain, gross blood in urine, urgency, frequency, nausea, vomiting, and abdominal pain.  Patient has tried  increasing fluids for relief of symptoms.  Patient does have a history of recurrent UTI. Patient does not have a history of pyelonephritis.      Review of Systems   Constitutional:  Negative for chills, fatigue and fever.   HENT:  Negative for trouble swallowing.    Eyes:  Negative for visual disturbance.   Respiratory:  Negative for cough, chest tightness and shortness of breath.    Cardiovascular:  Negative for chest pain and palpitations.   Gastrointestinal:  Negative for abdominal pain, constipation, diarrhea, nausea and vomiting.   Genitourinary:  Positive for dysuria. Negative for decreased urine volume, difficulty urinating, flank pain, frequency, hematuria, pelvic pain and urgency.   Musculoskeletal:  Negative for back pain.   Skin:  Negative for rash.   Neurological:  Negative for dizziness, syncope, light-headedness and confusion.   Psychiatric/Behavioral:  Negative for behavioral problems.         Objective   Vital Signs:   /70 (BP Location: Left arm, Patient Position: Sitting, Cuff Size: Adult)   Pulse 67   Temp 97.6 °F (36.4 °C) (Oral)   Ht 168.9 cm (66.5\")   Wt 92 kg (202 lb 12.8 oz)   SpO2 99%   BMI 32.24 kg/m²      BMI is >= 30 and <35. (Class 1 Obesity). The following " options were offered after discussion;: exercise counseling/recommendations and nutrition counseling/recommendations       Physical Exam  Vitals and nursing note reviewed.   Constitutional:       General: She is not in acute distress.     Appearance: She is well-developed. She is obese. She is not ill-appearing.   HENT:      Head: Normocephalic.   Eyes:      General: No scleral icterus.     Pupils: Pupils are equal, round, and reactive to light.   Cardiovascular:      Rate and Rhythm: Normal rate and regular rhythm.      Heart sounds: Normal heart sounds.   Pulmonary:      Effort: Pulmonary effort is normal. No respiratory distress.      Breath sounds: Normal breath sounds. No stridor.   Abdominal:      Palpations: Abdomen is soft. There is no mass.      Tenderness: There is no abdominal tenderness. There is no right CVA tenderness, left CVA tenderness, guarding or rebound.      Comments: No abdominal tenderness with palpation.  Negative bilateral CVA tenderness.  Abdomen palpates soft.  No guarding or rebound tenderness.   Skin:     General: Skin is warm.   Neurological:      General: No focal deficit present.      Mental Status: She is alert and oriented to person, place, and time.   Psychiatric:         Mood and Affect: Mood normal.                  The following data was reviewed by: KANCHAN Marvin on 04/22/2025:  Comprehensive Metabolic Panel (02/06/2025 18:51)   POCT urinalysis dipstick, automated (04/22/2025 09:08)   Results                 Assessment and Plan                 Adult hypothyroidism  Managed by Endocrinology  Recent labs WNL  Continue current regimen  Follow up in 6 months       Urinary tract infection with hematuria, site unspecified  Symptoms improved.  UA checked in the office today.  The patient was instructed to take medication as prescribed and until completed.  Will send urine specimen for urine culture.    Increase daily fluid intake to 2 to 3 L/day, restrict high oxalate foods such  as beans/spinach/beets/potato chips/french fries/nuts/tea.   Maintain proper urinary hygiene.  Seek immediate medical attention for severe pain in back or lower abdomen, fever, nausea and vomiting, chills, or any other worsening signs or symptoms.  Return to the office if symptoms worsen or fail to improve.     Orders:    Urine Culture - Urine, Urine, Clean Catch    nitrofurantoin, macrocrystal-monohydrate, (Macrobid) 100 MG capsule; Take 1 capsule by mouth 2 (Two) Times a Day for 5 days.    Dysuria    Orders:    Urine Culture - Urine, Urine, Clean Catch    nitrofurantoin, macrocrystal-monohydrate, (Macrobid) 100 MG capsule; Take 1 capsule by mouth 2 (Two) Times a Day for 5 days.    Frequent urination    Orders:    Urine Culture - Urine, Urine, Clean Catch    POCT urinalysis dipstick, automated    Burning with urination    Orders:    Urine Culture - Urine, Urine, Clean Catch    POCT urinalysis dipstick, automated             Follow Up     Return if symptoms worsen or fail to improve.    Patient was given instructions and counseling regarding her condition or for health maintenance advice. Please see specific information pulled into the AVS if appropriate.

## 2025-04-26 LAB
BACTERIA UR CULT: ABNORMAL
BACTERIA UR CULT: ABNORMAL
OTHER ANTIBIOTIC SUSC ISLT: ABNORMAL

## 2025-05-19 ENCOUNTER — TELEPHONE (OUTPATIENT)
Dept: FAMILY MEDICINE CLINIC | Facility: CLINIC | Age: 70
End: 2025-05-19
Payer: MEDICARE

## 2025-05-19 NOTE — TELEPHONE ENCOUNTER
Patient came into the office today.  She stated that she needed Halle Walton to send a [facility]  referral over to her  at [phone].  I told her that it looks like Halle sent an orthopedic surgery referral back on 3/03.  Patient stated that she has been having ongoing physical therapy for 17 years for a w/c injury.  I advised patient that we do not do w/c here in our office and she stated that Halle Walton already knew it was w/c.  She requested that the referral be sent to [facility], lamonte Hendricks - .[phone]

## 2025-05-21 ENCOUNTER — TELEPHONE (OUTPATIENT)
Dept: FAMILY MEDICINE CLINIC | Facility: CLINIC | Age: 70
End: 2025-05-21
Payer: MEDICARE

## 2025-05-21 NOTE — TELEPHONE ENCOUNTER
Received call from patient.  She was calling to follow up with the referral to [facility] from yesterday..   I read her Halle's message from yesterday and she stated that someone just needs to call the , Darya Hendricks at [phone].  I told her that I would send Halle and her MA a message but since our office does not do workers comp claims, I did not promise her anything.

## 2025-05-22 DIAGNOSIS — M48.02 NEUROFORAMINAL STENOSIS OF CERVICAL SPINE: Primary | ICD-10-CM

## 2025-05-22 DIAGNOSIS — M54.2 CHRONIC NECK PAIN: ICD-10-CM

## 2025-05-22 DIAGNOSIS — G89.29 CHRONIC NECK PAIN: ICD-10-CM

## 2025-05-22 DIAGNOSIS — M48.00 CENTRAL STENOSIS OF SPINAL CANAL: ICD-10-CM

## 2025-05-22 NOTE — TELEPHONE ENCOUNTER
Called and spoke with pt, she said that she is not doing workman's comp. She just needs a referral. She wants us to contact Darya as she can explain it better and if Halle still wants her to go another route she will. I will call tomorrow as I am leaving early.

## 2025-05-23 ENCOUNTER — TELEPHONE (OUTPATIENT)
Dept: FAMILY MEDICINE CLINIC | Facility: CLINIC | Age: 70
End: 2025-05-23
Payer: MEDICARE

## 2025-05-23 NOTE — TELEPHONE ENCOUNTER
I tried to call Darya. It is a fax number. Called and lvm for pt to return call with correct phone number.

## 2025-05-23 NOTE — TELEPHONE ENCOUNTER
Called and spoke with pt, informed her of provider's note. It is still a confusing situation. I will call Darya as pt wants me to get clarification.

## 2025-05-27 NOTE — TELEPHONE ENCOUNTER
Called and spoke with Darya. This is a workman's comp claim from 2017. She did not become our pt until 5/2023. Informed Darya of this information. We do not do workman's comp.   
Called and spoke with pt, informed her I spoke with Darya and she said it is a workman's comp claim. I explained to her since we do not do that, we cannot refer her.   
Received call from patient.  She called to give the correct number for Darya.  It is [phone].  I told her that I would let Ирина know  
Detail Level: Detailed
Quality 226: Preventive Care And Screening: Tobacco Use: Screening And Cessation Intervention: Patient screened for tobacco use and is an ex/non-smoker

## 2025-06-10 ENCOUNTER — OFFICE VISIT (OUTPATIENT)
Dept: FAMILY MEDICINE CLINIC | Facility: CLINIC | Age: 70
End: 2025-06-10
Payer: MEDICARE

## 2025-06-10 VITALS
OXYGEN SATURATION: 97 % | WEIGHT: 206.2 LBS | HEART RATE: 77 BPM | DIASTOLIC BLOOD PRESSURE: 66 MMHG | BODY MASS INDEX: 32.36 KG/M2 | HEIGHT: 67 IN | TEMPERATURE: 98 F | SYSTOLIC BLOOD PRESSURE: 118 MMHG

## 2025-06-10 DIAGNOSIS — R31.9 URINARY TRACT INFECTION WITH HEMATURIA, SITE UNSPECIFIED: Primary | ICD-10-CM

## 2025-06-10 DIAGNOSIS — N39.0 URINARY TRACT INFECTION WITH HEMATURIA, SITE UNSPECIFIED: Primary | ICD-10-CM

## 2025-06-10 DIAGNOSIS — R30.0 DYSURIA: ICD-10-CM

## 2025-06-10 DIAGNOSIS — R10.30 LOWER ABDOMINAL PAIN: ICD-10-CM

## 2025-06-10 DIAGNOSIS — R35.0 FREQUENT URINATION: ICD-10-CM

## 2025-06-10 RX ORDER — NITROFURANTOIN 25; 75 MG/1; MG/1
100 CAPSULE ORAL 2 TIMES DAILY
Qty: 14 CAPSULE | Refills: 0 | Status: SHIPPED | OUTPATIENT
Start: 2025-06-10 | End: 2025-06-17

## 2025-06-10 NOTE — PROGRESS NOTES
"Chief Complaint  Abdominal Pain (Lower- x's 2-3 weeks), Difficulty Urinating (pressure), and Urinary Frequency    Subjective          Abdominal Pain  Associated symptoms: dysuria and frequency    Associated symptoms: no fever, no hematuria, no nausea and no vomiting    Difficulty Urinating  Associated symptoms: frequency and urgency    Associated symptoms: no chills, no flank pain, no hematuria, no nausea and no vomiting    Urinary Frequency  Associated symptoms: frequency and urgency    Associated symptoms: no chills, no flank pain, no hematuria, no nausea and no vomiting           Oneida See 70 y.o. female presents today reporting dysuria, urgency, frequency, and lower abdominal pain. She has had symptoms for2-3 weeks. The symptoms are moderate. Patient denies flank pain, gross blood in urine, nausea, vomiting, and risk of STD. Patient has tried  increasing fluids for relief of symptoms. Patient does have a history of recurrent UTI. Patient does not have a history of pyelonephritis.       Review of Systems   Constitutional:  Negative for chills, diaphoresis and fever.   Respiratory:  Negative for chest tightness and shortness of breath.    Cardiovascular:  Negative for chest pain.   Gastrointestinal:  Positive for abdominal pain. Negative for nausea and vomiting.   Genitourinary:  Positive for dysuria, frequency and urgency. Negative for difficulty urinating, flank pain, hematuria and pelvic pain.   Musculoskeletal:  Negative for back pain.   Neurological:  Negative for dizziness and light-headedness.        Objective   Vital Signs:   /66 (BP Location: Left arm, Patient Position: Sitting, Cuff Size: Adult)   Pulse 77   Temp 98 °F (36.7 °C) (Oral)   Ht 168.9 cm (66.5\")   Wt 93.5 kg (206 lb 3.2 oz)   SpO2 97%   BMI 32.78 kg/m²        Physical Exam  Vitals and nursing note reviewed.   Constitutional:       General: She is not in acute distress.     Appearance: She is well-developed. She is not " ill-appearing.   HENT:      Head: Normocephalic and atraumatic.   Eyes:      Conjunctiva/sclera: Conjunctivae normal.   Cardiovascular:      Rate and Rhythm: Normal rate.   Pulmonary:      Effort: Pulmonary effort is normal. No respiratory distress.   Abdominal:      General: Bowel sounds are normal. There is no distension.      Palpations: Abdomen is soft. There is no mass.      Tenderness: There is no abdominal tenderness. There is no right CVA tenderness, left CVA tenderness, guarding or rebound.      Comments: No abdominal tenderness with palpation.  Negative bilateral CVA tenderness.  Abdomen palpates soft.  No guarding or rebound tenderness.   Musculoskeletal:         General: Normal range of motion.      Cervical back: Neck supple.   Skin:     General: Skin is warm and dry.      Findings: No rash.   Neurological:      Mental Status: She is alert and oriented to person, place, and time.      Deep Tendon Reflexes: Reflexes are normal and symmetric.   Psychiatric:         Mood and Affect: Mood normal.                  The following data was reviewed by: KANCHAN Marvin on 06/10/2025:  POCT urinalysis dipstick, automated (06/10/2025 14:07)   Comprehensive Metabolic Panel (02/06/2025 18:51)   Results                 Assessment and Plan                 Urinary tract infection with hematuria, site unspecified  -The patient was instructed to take medication as prescribed and until completed.  -Will send urine specimen for urine culture.    -Increase daily fluid intake to 2 to 3 L/day, restrict high oxalate foods such as beans/spinach/beets/potato chips/french fries/nuts/tea.    -Maintain proper urinary hygiene.  -Seek immediate medical attention for severe pain in back or lower abdomen, fever, nausea and vomiting, chills, or any other worsening signs or symptoms.  -Return to the office if symptoms worsen or fail to improve.    Orders:    Urine Culture - Urine, Urine, Clean Catch    nitrofurantoin,  macrocrystal-monohydrate, (Macrobid) 100 MG capsule; Take 1 capsule by mouth 2 (Two) Times a Day for 7 days.    Frequent urination    Orders:    POCT urinalysis dipstick, automated    Urine Culture - Urine, Urine, Clean Catch    nitrofurantoin, macrocrystal-monohydrate, (Macrobid) 100 MG capsule; Take 1 capsule by mouth 2 (Two) Times a Day for 7 days.    Lower abdominal pain    Orders:    POCT urinalysis dipstick, automated    Urine Culture - Urine, Urine, Clean Catch    nitrofurantoin, macrocrystal-monohydrate, (Macrobid) 100 MG capsule; Take 1 capsule by mouth 2 (Two) Times a Day for 7 days.    Dysuria    Orders:    Urine Culture - Urine, Urine, Clean Catch    nitrofurantoin, macrocrystal-monohydrate, (Macrobid) 100 MG capsule; Take 1 capsule by mouth 2 (Two) Times a Day for 7 days.             Follow Up     Return if symptoms worsen or fail to improve.    Patient was given instructions and counseling regarding her condition or for health maintenance advice. Please see specific information pulled into the AVS if appropriate.

## 2025-06-11 ENCOUNTER — TELEPHONE (OUTPATIENT)
Dept: FAMILY MEDICINE CLINIC | Facility: CLINIC | Age: 70
End: 2025-06-11
Payer: MEDICARE

## 2025-06-11 NOTE — TELEPHONE ENCOUNTER
With pt not having an open case with workman's comp she is not required to see those providers. This comp case went to court and has been settled.   All referral may be placed by proper providers. The billing will need to be linked to the claim number even though case is closed.

## 2025-06-13 DIAGNOSIS — M17.12 OSTEOARTHRITIS OF LEFT PATELLOFEMORAL JOINT: Primary | ICD-10-CM

## 2025-06-13 NOTE — TELEPHONE ENCOUNTER
Provider: MORENA VIA    Caller: Oneida See    Relationship to Patient: Self        Phone Number: [phone]    Reason for Call: PATIENT IS RETURNING CALL.  SHE STATES SHE WOULD LIKE TO GO TO [facility] [employer].    THE ADDRESS IS [address] [place] [address]  [phone]    PLEASE ADVISE.

## 2025-06-13 NOTE — TELEPHONE ENCOUNTER
HUB TO RELAY    REFERRALS CAN BE PLACED AND LINKED INTO CLAIM NUMBER. PCP NEEDS TO KNOW SPECIFICALLY WHO TO SEND REFERRALS TO. PLEASE OBTAIN INFO SO ORDERS CAN BE PLACED.

## 2025-06-14 LAB
BACTERIA UR CULT: ABNORMAL
BACTERIA UR CULT: ABNORMAL
OTHER ANTIBIOTIC SUSC ISLT: ABNORMAL

## 2025-06-15 ENCOUNTER — RESULTS FOLLOW-UP (OUTPATIENT)
Dept: FAMILY MEDICINE CLINIC | Facility: CLINIC | Age: 70
End: 2025-06-15
Payer: MEDICARE

## 2025-08-27 DIAGNOSIS — E03.9 HYPOTHYROIDISM, UNSPECIFIED TYPE: ICD-10-CM

## 2025-08-28 RX ORDER — LEVOTHYROXINE SODIUM 112 MCG
112 TABLET ORAL DAILY
Qty: 90 TABLET | Refills: 1 | Status: SHIPPED | OUTPATIENT
Start: 2025-08-28

## (undated) DEVICE — SUT VICRYL 1 CT1 27IN  JJ40G

## (undated) DEVICE — GLV SURG SIGNATURE ESSENTIAL PF LTX SZ8

## (undated) DEVICE — SYR LUERLOK 20CC BX/50

## (undated) DEVICE — TRAP FLD MINIVAC MEGADYNE 100ML

## (undated) DEVICE — ANTIBACTERIAL UNDYED BRAIDED (POLYGLACTIN 910), SYNTHETIC ABSORBABLE SUTURE: Brand: COATED VICRYL

## (undated) DEVICE — DRSNG WND GZ CURAD OIL EMULSION 3X8IN LF STRL 1PK

## (undated) DEVICE — GLV SURG PREMIERPRO ORTHO LTX PF SZ8.5 BRN

## (undated) DEVICE — CULT AER/ANAEROB FASTIDIOUS BACT

## (undated) DEVICE — PK HIP TOTL 40

## (undated) DEVICE — PENCL E/S ULTRAVAC TELESCP NOSE HOLSTR 10FT

## (undated) DEVICE — APPL DURAPREP IODOPHOR APL 26ML

## (undated) DEVICE — NEEDLE, QUINCKE, 20GX3.5": Brand: MEDLINE

## (undated) DEVICE — GAUZE,SPONGE,FLUFF,6"X6.75",STRL,10/TRAY: Brand: MEDLINE

## (undated) DEVICE — 3M™ IOBAN™ 2 ANTIMICROBIAL INCISE DRAPE 6650EZ: Brand: IOBAN™ 2